# Patient Record
Sex: MALE | Race: WHITE | NOT HISPANIC OR LATINO | Employment: OTHER | URBAN - METROPOLITAN AREA
[De-identification: names, ages, dates, MRNs, and addresses within clinical notes are randomized per-mention and may not be internally consistent; named-entity substitution may affect disease eponyms.]

---

## 2019-02-12 PROBLEM — I10 ESSENTIAL HYPERTENSION: Status: ACTIVE | Noted: 2019-02-12

## 2020-10-16 PROBLEM — M72.0 DUPUYTREN CONTRACTURE: Status: ACTIVE | Noted: 2020-10-16

## 2020-10-16 PROBLEM — F17.200 SMOKER: Status: ACTIVE | Noted: 2020-10-16

## 2021-03-25 PROBLEM — J44.9 CHRONIC OBSTRUCTIVE PULMONARY DISEASE (HCC): Status: ACTIVE | Noted: 2021-03-25

## 2021-03-25 PROBLEM — I47.19 NARROW COMPLEX TACHYCARDIA (HCC): Status: ACTIVE | Noted: 2021-03-25

## 2021-10-15 PROBLEM — C44.311 BASAL CELL CARCINOMA (BCC) OF SKIN OF NOSE: Status: ACTIVE | Noted: 2021-10-15

## 2022-07-02 ENCOUNTER — HOSPITAL ENCOUNTER (OUTPATIENT)
Dept: RADIOLOGY | Facility: MEDICAL CENTER | Age: 67
End: 2022-07-02

## 2022-07-02 PROBLEM — I65.23 BILATERAL CAROTID ARTERY STENOSIS: Status: ACTIVE | Noted: 2022-07-02

## 2022-07-02 PROBLEM — I63.9 LEFT-SIDED CEREBROVASCULAR ACCIDENT (CVA) (HCC): Status: ACTIVE | Noted: 2022-07-02

## 2022-07-02 PROBLEM — R73.03 PREDIABETES: Status: ACTIVE | Noted: 2022-07-02

## 2022-07-03 PROBLEM — Z86.79 HISTORY OF SUPRAVENTRICULAR TACHYCARDIA: Status: ACTIVE | Noted: 2021-03-25

## 2022-08-29 ENCOUNTER — OFFICE VISIT (OUTPATIENT)
Dept: NEUROLOGY | Facility: MEDICAL CENTER | Age: 67
End: 2022-08-29
Attending: NURSE PRACTITIONER
Payer: MEDICARE

## 2022-08-29 VITALS
HEART RATE: 90 BPM | WEIGHT: 188.49 LBS | BODY MASS INDEX: 22.26 KG/M2 | HEIGHT: 77 IN | SYSTOLIC BLOOD PRESSURE: 120 MMHG | OXYGEN SATURATION: 96 % | RESPIRATION RATE: 12 BRPM | TEMPERATURE: 98 F | DIASTOLIC BLOOD PRESSURE: 70 MMHG

## 2022-08-29 DIAGNOSIS — I63.40 CEREBROVASCULAR ACCIDENT (CVA) DUE TO EMBOLISM OF CEREBRAL ARTERY (HCC): ICD-10-CM

## 2022-08-29 DIAGNOSIS — I69.30 LATE EFFECT OF STROKE: ICD-10-CM

## 2022-08-29 DIAGNOSIS — I10 ESSENTIAL HYPERTENSION: ICD-10-CM

## 2022-08-29 DIAGNOSIS — E78.2 MIXED HYPERLIPIDEMIA: ICD-10-CM

## 2022-08-29 DIAGNOSIS — R73.03 PREDIABETES: ICD-10-CM

## 2022-08-29 DIAGNOSIS — F17.200 TOBACCO USE DISORDER: ICD-10-CM

## 2022-08-29 PROCEDURE — 99212 OFFICE O/P EST SF 10 MIN: CPT | Performed by: NURSE PRACTITIONER

## 2022-08-29 PROCEDURE — 99204 OFFICE O/P NEW MOD 45 MIN: CPT | Performed by: NURSE PRACTITIONER

## 2022-08-29 RX ORDER — ATORVASTATIN CALCIUM 40 MG/1
40 TABLET, FILM COATED ORAL NIGHTLY
Qty: 90 TABLET | Refills: 1 | Status: SHIPPED | OUTPATIENT
Start: 2022-08-29 | End: 2022-10-31 | Stop reason: DRUGHIGH

## 2022-08-29 ASSESSMENT — ENCOUNTER SYMPTOMS
DEPRESSION: 0
BRUISES/BLEEDS EASILY: 1
NERVOUS/ANXIOUS: 0
COUGH: 0
BLOOD IN STOOL: 0
FOCAL WEAKNESS: 0
HEADACHES: 0
DOUBLE VISION: 0
PALPITATIONS: 0
BLURRED VISION: 0
SPEECH CHANGE: 1
FALLS: 0
DIZZINESS: 0
TINGLING: 0
WEAKNESS: 0
SENSORY CHANGE: 0

## 2022-08-29 ASSESSMENT — FIBROSIS 4 INDEX: FIB4 SCORE: 1.01

## 2022-08-29 NOTE — PROGRESS NOTES
Subjective     HPI    Arnulfo Crocker is a 66 y.o.  right handed male who presents to [facility] for evaluation of L frontal lobe/insular infarct      He presented to [facility] on 7/2/2022 with complaints of slurred speech and RLE weakness. He had noticed weakness of RUE/RLE in the middle of the night when he got up to use the restroom. MRI demonstrated hyperacute L frontal lobe/insular infarct. NIHSS on admission unknown.    Discharged on DAPT x 10 days and atorvastatin 80mg    PMH :  HTN, SVT  Social History: Smoker-started in 1968, now down to 6 cigarettes daily, 2-3 beers per week. Denies drug use.  Former heavy alcohol 12-18 beers per day quit drinking a while ago.     He is here alone tonight.He feels that the weakness has resolved.  He has been doing a lot of work around his house.He does not check blood pressure at home.  He is down to 6 cigarettes daily. He feels like his speech is close to being normal.  He just finished speech therapy.   He is taking aspirin daily, no problems.       Review of Systems   HENT:  Negative for nosebleeds.    Eyes:  Negative for blurred vision and double vision.   Respiratory:  Negative for cough.    Cardiovascular:  Negative for chest pain and palpitations.   Gastrointestinal:  Negative for blood in stool.   Genitourinary:  Negative for hematuria.   Musculoskeletal:  Negative for falls.   Neurological:  Positive for speech change. Negative for dizziness, tingling, sensory change, focal weakness, weakness and headaches.   Endo/Heme/Allergies:  Bruises/bleeds easily.   Psychiatric/Behavioral:  Negative for depression. The patient is not nervous/anxious.         Current Outpatient Medications on File Prior to Visit   Medication Sig Dispense Refill    aspirin EC 81 MG EC tablet Take 1 Tablet by mouth every day. 90 Tablet 3    metoprolol tartrate (LOPRESSOR) 25 MG Tab TAKE 1/2 TABLET BY MOUTH TWICE DAILY FOR HIGH BLOOD PRESSURE 90 Tablet 2     "lisinopril (PRINIVIL) 20 MG Tab TAKE 1 TABLET BY MOUTH EVERY DAY 90 Tablet 3     No current facility-administered medications on file prior to visit.       Objective         I personally reviewed imaging below and agree with the findings  CT head 7/2/2022  Unremarkable CT scan of the brain.  CTA head 7/2/2022  At the foramen magnum, there is a calcified plaque resulting in 70% stenosis of the left vertebral artery.  Vertebral basilar confluence appears unremarkable.     Left posterior cerebral artery appears patent. Right posterior cerebral artery demonstrates fetal origin, and otherwise appears normal.     Remaining there is prominent calcified plaque involving the left posterior cerebral internal carotid artery at the clinoids, resulting in 60% stenosis.     Left middle cerebral artery is patent.     Right middle cerebral artery is patent  CTA neck 7/2/2022  Submandibular lymphadenopathy is present. Etiology indeterminate.     20% stenosis of the right internal carotid artery at the bifurcation.     20-30% stenosis of the right internal carotid artery to skull base.     30-40% stenosis the left internal carotid artery at the origin. 50-60% stenosis of the left internal carotid artery at the clinoids.     Diminutive atretic appearance of the right vertebral artery. 60-70% stenosis of the right vertebral artery at T1.     70-80% stenosis of the left vertebral artery at T2.     60-70% stenosis of the left vertebral artery at the foramen magnum.    TTE 7/3/2022:  Mild concentric LVH, no evidence of shunt, LA size WNL, EDWARDO 19, no ASD noted.    Stroke Labs:   , A1C 5.9, Creat 0.9    /70 (BP Location: Left arm, Patient Position: Sitting, BP Cuff Size: Adult)   Pulse 90   Temp 36.7 °C (98 °F) (Temporal)   Resp 12   Ht 1.956 m (6' 5\")   Wt 85.5 kg (188 lb 7.9 oz)   SpO2 96%   BMI 22.35 kg/m²          PHYSICAL ASSESSMENT  Constitutional:  Alert, no apparent distress,  Psych:   mood and affect " WNL  Muskuloskeletal:  Moves all extremities equally, strength 5/5  BUE/BLE flexors/extensors, no drift  NEUROLOGICAL ASSESSMENT  Oriented X 4, speech fluent, naming and memory intact  CN II: Visual fields are full to confrontation. Fundoscopic exam is normal with sharp discs and no vascular changes. Pupils are 3 mm and briskly reactive to light.   CN III: IV, VI  EOMs intact, no ptosis  CN V: Facial sensation is intact to pinprick in all 3 divisions bilaterally. Corneal responses are intact.  CN VII: Face is symmetric with normal eye closure and smile.  CN VIII Hearing is normal to rubbing fingers  CN IX, X: Palate elevates symmetrically. Phonation is normal.  CN XI: Head turning and shoulder shrug are intact  CN XII: Tongue is midline with normal movements and no atrophy.                           Sensation to PP equal bilaterally                 No limb ataxia with finger to nose and heel to shin                 Ambulates with steady gait.                 Rhomberg negative                Biceps,brachioradialis, tricep, and patellar reflexes all 2+     Cardiovascular:    S1S2, no abnormal rhythm auscultated, no peripheral edema  Neck:                     No carotid bruits noted   Pulmonary:            Respirations easy, lungs clear to auscultation all fields.     Skin:                     No obvious rashes.    Iniital NIHSS unknown       Current NIHSS    1a. LOC: 0  1b. LOC Questions: 0  1c. LOC Commands: 0  2. Best Gaze:0  3. Visual Fields: 0  4. Facial Paresis: 0  5a. Motor arm left: 0  5b. Motor arm right: 0  6a. Motor leg left: 0  6b. Motor leg right: 0  7. Sensory: 0  8. Best Language: 0  9. Limb Ataxia: 0  10. Dysarthria: 0  11. Extinction/Inattention: 0    Total Score Current 0        Current mRS 0    Assessment & Plan     Late effect of stroke     Presumed Mechanism by TOAST.  Left frontal Insular infarct, although there is calcified block both intra and extra cranial, there is no significant stenosis,  will need embolic work up.  He has recovered well.  __  Large Artery Atherosclerosis  __  Small Vessel (lacunar)  __   Cardioembolic  __   Other (Sickle cell, vasculitis, hypercoagulable)  _X_   Unknown      Stroke risk factors:  HLD, HTN, smoking.     Continue ASA 81mg PO daily for stroke prevention, discussed signs of bleeding.    Refer to cardiology, will need long term heart monitoring, can start with 30 day BioTel.    30 day Biotel    Cardiology referral         Risk factors for Afib:  History of heavy alcohol use, smoking, HTN.       2. Essential Hypertension    Blood pressure goal less than 130/80, currently at goal.    3. Tobacco use disorder       Complete smoking cessation discussed, medications deferred.       4. Prediabetes        A1a 5.9, discussed dietary and exercise modifications.     5. Mixed hyperlidemia.    LDL goal < 70, current 128 (pre-statin), Reduce Atorvastatin to 40mg, discussed medication side effects, will need follow up with primary care evaluate liver function at intervals and refill  Exercise at least 30 minutes daily, avoid/minimize red meat, fried foods, butter, cheese.   Eat 5-6 servings of vegetables and fruits daily, choose lean white meat without skin (chicken, turkey, white fish)--baked, broiled or grilled.      If any new signs of stroke:  sudden weakness, numbness, speech difficulty (slurring or difficulty finding words), imbalance, incoordination, worse headache of life or vision loss occur, call 911.      Take all medications as prescribed unless instructed by your provider.        I I have counseled patient on stroke prevention strategies, stroke symptoms and mimics.  Diet and exercise modifications.  We discussed medication side effects and instructions.       I have provided patient a written personalized stroke prevention plan,  it is filed under the media tab under '[facility]”.      Follow up in 3 months.

## 2022-08-29 NOTE — PATIENT INSTRUCTIONS
If any new signs of stroke:  sudden weakness, numbness, speech difficulty (slurring or difficulty finding words), imbalance, incoordination, worse headache of life or vision loss occur, call 911.      Take all medications as prescribed unless instructed by your provider.    On the atorvastatin, you can start taking 40mg instead of 80mg.   Break the tablet in half for now and I will send a new prescription to [facility].       Cardiology will reach out to you to schedule the heart monitor (I asked if they could send it to you), you will also need a cardiology appointment, I asked if they could do it in [place].

## 2022-09-06 ENCOUNTER — NON-PROVIDER VISIT (OUTPATIENT)
Dept: CARDIOLOGY | Facility: MEDICAL CENTER | Age: 67
End: 2022-09-06
Attending: NURSE PRACTITIONER
Payer: MEDICARE

## 2022-09-06 DIAGNOSIS — I63.40 CEREBROVASCULAR ACCIDENT (CVA) DUE TO EMBOLISM OF CEREBRAL ARTERY (HCC): ICD-10-CM

## 2022-09-06 DIAGNOSIS — I49.1 APC (ATRIAL PREMATURE CONTRACTIONS): ICD-10-CM

## 2022-09-06 DIAGNOSIS — I49.3 PVC'S (PREMATURE VENTRICULAR CONTRACTIONS): ICD-10-CM

## 2022-09-06 NOTE — PROGRESS NOTES
Monitor still in progress on 10/15/2022      Home enrollment completed in the 30 day [employer] heart monitoring program, per SANDI Ashley.  Monitor will be shipped to patient via [employer].  >Pending Baseline Transmission.  >Pending EOS.

## 2022-10-04 ENCOUNTER — TELEPHONE (OUTPATIENT)
Dept: CARDIOLOGY | Facility: MEDICAL CENTER | Age: 67
End: 2022-10-04
Payer: MEDICARE

## 2022-10-04 NOTE — TELEPHONE ENCOUNTER
José Miguel Ohara Pt was returning your call and stated that no prior labs, testing or anything else outside of [facility] except maybe [place] [facility] Prior cardio provider through [facility], but doesn't remember their name.      Thank you,     EDE

## 2022-10-04 NOTE — TELEPHONE ENCOUNTER
Chart Prep    Called patient in regards to records for NP appointment with Dr. Dominguez. To review most recent lab results, ekg, any cardiac testing or ,if he has been treated by a cardiologist. No answer, left voicemail to call back

## 2022-10-12 ENCOUNTER — OFFICE VISIT (OUTPATIENT)
Dept: CARDIOLOGY | Facility: MEDICAL CENTER | Age: 67
End: 2022-10-12
Attending: NURSE PRACTITIONER
Payer: MEDICARE

## 2022-10-12 VITALS
BODY MASS INDEX: 22.19 KG/M2 | HEIGHT: 78 IN | RESPIRATION RATE: 14 BRPM | WEIGHT: 191.8 LBS | HEART RATE: 64 BPM | OXYGEN SATURATION: 98 % | DIASTOLIC BLOOD PRESSURE: 64 MMHG | SYSTOLIC BLOOD PRESSURE: 112 MMHG

## 2022-10-12 DIAGNOSIS — F17.200 TOBACCO USE DISORDER: ICD-10-CM

## 2022-10-12 DIAGNOSIS — Z91.89 OTHER SPECIFIED PERSONAL RISK FACTORS, NOT ELSEWHERE CLASSIFIED: ICD-10-CM

## 2022-10-12 DIAGNOSIS — E78.2 MIXED HYPERLIPIDEMIA: ICD-10-CM

## 2022-10-12 DIAGNOSIS — I10 ESSENTIAL HYPERTENSION: ICD-10-CM

## 2022-10-12 DIAGNOSIS — I63.40 CEREBROVASCULAR ACCIDENT (CVA) DUE TO EMBOLISM OF CEREBRAL ARTERY (HCC): ICD-10-CM

## 2022-10-12 LAB — EKG IMPRESSION: NORMAL

## 2022-10-12 PROCEDURE — 99406 BEHAV CHNG SMOKING 3-10 MIN: CPT | Performed by: INTERNAL MEDICINE

## 2022-10-12 PROCEDURE — 99204 OFFICE O/P NEW MOD 45 MIN: CPT | Mod: 25 | Performed by: INTERNAL MEDICINE

## 2022-10-12 PROCEDURE — 93000 ELECTROCARDIOGRAM COMPLETE: CPT | Performed by: INTERNAL MEDICINE

## 2022-10-12 ASSESSMENT — ENCOUNTER SYMPTOMS
GASTROINTESTINAL NEGATIVE: 1
CONSTITUTIONAL NEGATIVE: 1
FEVER: 0
PALPITATIONS: 0
NAUSEA: 0
MUSCULOSKELETAL NEGATIVE: 1
COUGH: 0
ABDOMINAL PAIN: 0
CARDIOVASCULAR NEGATIVE: 1
BRUISES/BLEEDS EASILY: 0
NERVOUS/ANXIOUS: 0
HEADACHES: 0
MYALGIAS: 0
WEAKNESS: 0
DEPRESSION: 0
WEIGHT LOSS: 0
DOUBLE VISION: 0
DIZZINESS: 0
FOCAL WEAKNESS: 0
CLAUDICATION: 0
NEUROLOGICAL NEGATIVE: 1
RESPIRATORY NEGATIVE: 1
EYES NEGATIVE: 1
PSYCHIATRIC NEGATIVE: 1
SHORTNESS OF BREATH: 0
CHILLS: 0
VOMITING: 0
BLURRED VISION: 0

## 2022-10-12 ASSESSMENT — FIBROSIS 4 INDEX: FIB4 SCORE: 1.01

## 2022-10-12 NOTE — PROGRESS NOTES
Chief Complaint   Patient presents with    Other     NP Dx: Cerebrovascular accident (CVA) due to embolism of cerebral artery (HCC)    Hyperlipidemia     NP Dx: Mixed hyperlipidemia      Hypertension       Subjective     Arnulfo Crocker is a 66 y.o. male who presents today as a consult from Lilia Carcamo, neurology for cerebrovascular accident.    Thank you for allowing me to evaluate Mr. Crocker, who as you know is a 66 year old male with hypertension and hyperlipidemia, chronic tobacco abuse. He was recently admitted to [facility] between 07/02/22 to 07/04/22 for cerebrovascular accident. Since the discharge he has been doing well. He denies chest pain, shortness of breath, palpitations, nausea/vomiting or diaphoresis. He just returned his BioTel monitor. He lives by [place] and keeps active working on his yard.    Past Medical History:   Diagnosis Date    CVA (cerebral vascular accident) (HCC)     H/O mumps     History of chickenpox     Hyperlipidemia     Hypertension      Past Surgical History:   Procedure Laterality Date    ORIF, HAND  2013    Right hand      Family History   Problem Relation Age of Onset    Diabetes Mother     Breast Cancer Mother     Heart Disease Father     Cancer Father     Adjustment disorder with mixed anxiety and depressed mood Father     Hypertension Sister     Stroke Other      Social History     Socioeconomic History    Marital status: Single     Spouse name: Not on file    Number of children: Not on file    Years of education: Not on file    Highest education level: Not on file   Occupational History    Not on file   Tobacco Use    Smoking status: Every Day     Packs/day: 0.25     Types: Cigarettes     Start date: 1968    Smokeless tobacco: Never   Vaping Use    Vaping Use: Never used   Substance and Sexual Activity    Alcohol use: Yes     Alcohol/week: 12.6 oz     Types: 21 Cans of beer per week     Comment: 3 beers per day    Drug use: No    Sexual activity:  Never   Other Topics Concern    Not on file   Social History Narrative    Not on file     Social Determinants of Health     Financial Resource Strain: Not on file   Food Insecurity: Not on file   Transportation Needs: Not on file   Physical Activity: Not on file   Stress: Not on file   Social Connections: Not on file   Intimate Partner Violence: Not on file   Housing Stability: Not on file     No Known Allergies    (Medications reviewed.)  Outpatient Encounter Medications as of 10/12/2022   Medication Sig Dispense Refill    atorvastatin (LIPITOR) 40 MG Tab Take 1 Tablet by mouth every evening. 90 Tablet 1    aspirin EC 81 MG EC tablet Take 1 Tablet by mouth every day. 90 Tablet 3    metoprolol tartrate (LOPRESSOR) 25 MG Tab TAKE 1/2 TABLET BY MOUTH TWICE DAILY FOR HIGH BLOOD PRESSURE 90 Tablet 2    lisinopril (PRINIVIL) 20 MG Tab TAKE 1 TABLET BY MOUTH EVERY DAY (Patient taking differently: Take 10 mg by mouth every day.) 90 Tablet 3     No facility-administered encounter medications on file as of 10/12/2022.     Review of Systems   Constitutional: Negative.  Negative for chills, fever, malaise/fatigue and weight loss.   HENT: Negative.  Negative for hearing loss.    Eyes: Negative.  Negative for blurred vision and double vision.   Respiratory: Negative.  Negative for cough and shortness of breath.    Cardiovascular: Negative.  Negative for chest pain, palpitations, claudication and leg swelling.   Gastrointestinal: Negative.  Negative for abdominal pain, nausea and vomiting.   Genitourinary: Negative.  Negative for dysuria and urgency.   Musculoskeletal: Negative.  Negative for joint pain and myalgias.   Skin: Negative.  Negative for itching and rash.   Neurological: Negative.  Negative for dizziness, focal weakness, weakness and headaches.   Endo/Heme/Allergies: Negative.  Does not bruise/bleed easily.   Psychiatric/Behavioral: Negative.  Negative for depression. The patient is not nervous/anxious.        "      Objective     /64 (BP Location: Left arm, Patient Position: Sitting, BP Cuff Size: Adult)   Pulse 64   Resp 14   Ht 1.981 m (6' 6\")   Wt 87 kg (191 lb 12.8 oz)   SpO2 98%   BMI 22.16 kg/m²     Physical Exam  Constitutional:       Appearance: Normal appearance. He is underweight.   HENT:      Head: Normocephalic and atraumatic.   Neck:      Vascular: No JVD.   Cardiovascular:      Rate and Rhythm: Normal rate and regular rhythm.      Heart sounds: Normal heart sounds.   Pulmonary:      Effort: Pulmonary effort is normal.      Breath sounds: Normal breath sounds.   Abdominal:      General: Bowel sounds are normal.      Palpations: Abdomen is soft.      Comments: No hepatosplenomegaly.   Musculoskeletal:         General: Normal range of motion.   Lymphadenopathy:      Cervical: No cervical adenopathy.   Skin:     General: Skin is warm and dry.   Neurological:      Mental Status: He is alert and oriented to person, place, and time.          CARDIAC STUDIES/PROCEDURES:    CTA OF HEAD AND NECK (07/02/22)  Submandibular lymphadenopathy is present. Etiology indeterminate.  20% stenosis of the right internal carotid artery at the bifurcation.  20-30% stenosis of the right internal carotid artery to skull base.  30-40% stenosis the left internal carotid artery at the origin. 50-60% stenosis of the left internal carotid artery at the clinoids.  Diminutive atretic appearance of the right vertebral artery. 60-70% stenosis of the right vertebral artery at T1.  70-80% stenosis of the left vertebral artery at T2.  60-70% stenosis of the left vertebral artery at the foramen magnum.  (study result reviewed)     ECHOCARDIOGRAM CONCLUSIONS (07/03/22)  Mild concentric left ventricular hypertrophy. Normal left ventricular   size and systolic function.  No evidence of shunt by agitated saline study.  Normal pericardium without effusion.  No prior images available for comparison.  (study result reviewed)    Laboratory " results of (07/03/22) were reviewed. Cholesterol profile of 190/154/31/128 mg/dL noted.    MRA OF BRAIN (07/02/22)  Findings are compatible with hyperacute infarction involving deep white matter of left insula, and posterior left frontal lobe, measuring 19 x 14 x 14 mm.  Degenerative changes are noted.  Remote lacunar infarction, left insula.  (study result reviewed)    Assessment & Plan     1. Cerebrovascular accident (CVA) due to embolism of cerebral artery (HCC)  EKG      2. Essential hypertension        3. Mixed hyperlipidemia        4. Tobacco use disorder            Medical Decision Making: Today's Assessment/Status/Plan:        Status post stroke: He is clinically improving. He had multiple infarctions noted on her MRI study of the brain. He underwent BioTel monitor evaluation. We will obtain the results and schedule for Implantable Loop Recorder placement if atrial fibrillation is not revealed. He understands the risks and benefits and agrees with plan.   Hypertension: Blood pressure is well controlled. We will continue with metoprolol, lisinopril.  Hyperlipidemia: He is doing well on statin therapy without myalgia symptoms. (Managed by primary care physician) We will perform CT coronary calcium study.  Chronic tobacco use: Smoking cessation recommended with discussions of health effects and plans for over 3 minutes.    We will follow up in 3 months.    Thank you for this consult.    CC Leon Zamora

## 2022-10-17 ENCOUNTER — TELEPHONE (OUTPATIENT)
Dept: CARDIOLOGY | Facility: MEDICAL CENTER | Age: 67
End: 2022-10-17
Payer: MEDICARE

## 2022-10-17 DIAGNOSIS — I63.40 CEREBROVASCULAR ACCIDENT (CVA) DUE TO EMBOLISM OF CEREBRAL ARTERY (HCC): ICD-10-CM

## 2022-10-18 PROCEDURE — 93228 REMOTE 30 DAY ECG REV/REPORT: CPT | Performed by: INTERNAL MEDICINE

## 2022-10-18 NOTE — TELEPHONE ENCOUNTER
Jose Dominguez M.D.  You 2 minutes ago (2:36 PM)     Please call with unremarkable BioTel showing no evidence of atrial fibrillation. Please schedule for Implantable Loop Recorder placement as we discussed on our last appointment for stroke evaluation or make a follow up. Marie.  NEDA

## 2022-10-19 NOTE — TELEPHONE ENCOUNTER
Phone Number Called: [phone]    Call outcome: Spoke to patient regarding message below.    Message: Called to inform patient of JI recommendations. Patient verbalizes understanding and would like to proceed with ILR.     TO Grace/Marli: Please reach out to patient to schedule ILR.

## 2022-10-19 NOTE — TELEPHONE ENCOUNTER
Patient scheduled for loop insert on 10-27-22 with Dr. Aaron. Patient has been instructed to check in at 12:00 for 1:00 case time. Message sent to authorizations. ROBERTO CARLOS Sands with [employer].

## 2022-10-27 ENCOUNTER — HOSPITAL ENCOUNTER (OUTPATIENT)
Dept: RADIOLOGY | Facility: MEDICAL CENTER | Age: 67
End: 2022-10-27
Attending: INTERNAL MEDICINE | Admitting: INTERNAL MEDICINE

## 2022-10-27 ENCOUNTER — HOSPITAL ENCOUNTER (OUTPATIENT)
Facility: MEDICAL CENTER | Age: 67
End: 2022-10-27
Attending: INTERNAL MEDICINE | Admitting: INTERNAL MEDICINE
Payer: MEDICARE

## 2022-10-27 ENCOUNTER — APPOINTMENT (OUTPATIENT)
Dept: CARDIOLOGY | Facility: MEDICAL CENTER | Age: 67
End: 2022-10-27
Attending: INTERNAL MEDICINE
Payer: MEDICARE

## 2022-10-27 VITALS
TEMPERATURE: 96.6 F | HEART RATE: 63 BPM | DIASTOLIC BLOOD PRESSURE: 62 MMHG | WEIGHT: 190.7 LBS | RESPIRATION RATE: 16 BRPM | OXYGEN SATURATION: 98 % | BODY MASS INDEX: 22.06 KG/M2 | HEIGHT: 78 IN | SYSTOLIC BLOOD PRESSURE: 168 MMHG

## 2022-10-27 DIAGNOSIS — I63.40 CEREBROVASCULAR ACCIDENT (CVA) DUE TO EMBOLISM OF CEREBRAL ARTERY (HCC): ICD-10-CM

## 2022-10-27 DIAGNOSIS — Z91.89 OTHER SPECIFIED PERSONAL RISK FACTORS, NOT ELSEWHERE CLASSIFIED: ICD-10-CM

## 2022-10-27 PROCEDURE — 160002 HCHG RECOVERY MINUTES (STAT)

## 2022-10-27 PROCEDURE — 33285 INSJ SUBQ CAR RHYTHM MNTR: CPT | Performed by: INTERNAL MEDICINE

## 2022-10-27 PROCEDURE — 4410556 CT-CARDIAC SCORING (SELF PAY ONLY)

## 2022-10-27 PROCEDURE — 33285 INSJ SUBQ CAR RHYTHM MNTR: CPT

## 2022-10-27 PROCEDURE — 160046 HCHG PACU - 1ST 60 MINS PHASE II

## 2022-10-27 RX ORDER — LIDOCAINE HYDROCHLORIDE AND EPINEPHRINE 10; 10 MG/ML; UG/ML
INJECTION, SOLUTION INFILTRATION; PERINEURAL
Status: DISCONTINUED
Start: 2022-10-27 | End: 2022-10-27 | Stop reason: HOSPADM

## 2022-10-27 ASSESSMENT — PAIN DESCRIPTION - PAIN TYPE: TYPE: SURGICAL PAIN

## 2022-10-27 ASSESSMENT — FIBROSIS 4 INDEX: FIB4 SCORE: 1.01

## 2022-10-27 NOTE — OR NURSING
Patient from cath lab, left chest dressing is clean dry and intact. Aaox4, vss. Instructions to patient, ambulated to car.

## 2022-10-27 NOTE — DISCHARGE INSTRUCTIONS
Implantable Loop Recorder Placement, Care After  This sheet gives you information about how to care for yourself after your procedure. Your health care provider may also give you more specific instructions. If you have problems or questions, contact your health care provider.  What can I expect after the procedure?  After the procedure, it is common to have:  Soreness or discomfort near the incision.  Some swelling or bruising near the incision.  Follow these instructions at home:  Incision care    Follow instructions from your health care provider about how to take care of your incision. Make sure you: REMOVE DRESSING IN 1 WEEK. NO SHOWERS UNTIL DRESSING IS REMOVED, YOU MAY SPONGE BATH.  Wash your hands with soap and water before you change your bandage (dressing). If soap and water are not available, use hand .  Change your dressing as told by your health care provider.  Keep your dressing dry.  Leave stitches (sutures), skin glue, or adhesive strips in place. These skin closures may need to stay in place for 2 weeks or longer. If adhesive strip edges start to loosen and curl up, you may trim the loose edges. Do not remove adhesive strips completely unless your health care provider tells you to do that.  Check your incision area every day for signs of infection. Check for:  Redness, swelling, or pain.  Fluid or blood.  Warmth.  Pus or a bad smell.  Do not take baths, swim, or use a hot tub until your health care provider approves. Ask your health care provider if you can take showers.  Activity    Return to your normal activities as told by your health care provider. Ask your health care provider what activities are safe for you.  Do not drive for 24 hours if you were given a sedative during your procedure.  General instructions  Follow instructions from your health care provider about how to manage your implantable loop recorder and transmit the information. Learn how to activate a recording if this is  necessary for your type of device.  Do not go through a metal detection gate, and do not let someone hold a metal detector over your chest. Show your ID card.  Do not have an MRI unless you check with your health care provider first.  Take over-the-counter and prescription medicines only as told by your health care provider.  Keep all follow-up visits as told by your health care provider. This is important.  Contact a health care provider if:  You have redness, swelling, or pain around your incision.  You have a fever.  You have pain that is not relieved by your pain medicine.  You have triggered your device because of fainting (syncope) or because of a heartbeat that feels like it is racing, slow, fluttering, or skipping (palpitations).  Get help right away if you have:  Chest pain.  Difficulty breathing.  Summary  After the procedure, it is common to have soreness or discomfort near the incision.  Change your dressing as told by your health care provider.  Follow instructions from your health care provider about how to manage your implantable loop recorder and transmit the information.  Keep all follow-up visits as told by your health care provider. This is important.  This information is not intended to replace advice given to you by your health care provider. Make sure you discuss any questions you have with your health care provider.

## 2022-10-27 NOTE — OP REPORT
Electrophysiology Procedure Note  [facility]      PROCEDURE PERFORMED: Implantable Loop Recorder    : MARISSA Aaron M.D.    ASSISTANT: None    ANESTHESIA: Local    EBL: <5 cc    SPECIMENS: None    INDICATION: Cryptogenic CVA    COMPLICATIONS: None    PRE-PROCEDURE ECG: Sinus rhythm    POST-PROCEDURE ECG: Sinus rhythm    DESCRIPTION OF PROCEDURE:  After informed written consent, the patient was brought to the cath lab pre/post procedure area. The patient was prepped and draped in the usual sterile fashion. The procedure was performed with local anesthetic. Using the supplied incision tool, a <1 cm incision was made in the skin about 2 cm leftward and lateral to the sternal border at 4th intercostal space. Using the supplied insertion tool, a tunnel was made in the subcutaneous tissue at a 45 degree angle to the sternum and the device was inserted with the supplied plunger. Manual compression was used until hemostasis achieved. Dermabond used for closure. Sterile dressing was applied. Sensing checked and adequate.    IMPLANTED DEVICE INFORMATION:  Model: [employer] LINQ 22  Serial number: [id number]    IMPRESSIONS:  1. Successful implantable loop recorder implantation    RECOMMENDATIONS:  1. Routine follow-up and device interrogation

## 2022-10-31 ENCOUNTER — TELEPHONE (OUTPATIENT)
Dept: CARDIOLOGY | Facility: MEDICAL CENTER | Age: 67
End: 2022-10-31
Payer: MEDICARE

## 2022-10-31 DIAGNOSIS — E78.2 MIXED HYPERLIPIDEMIA: ICD-10-CM

## 2022-10-31 DIAGNOSIS — I10 ESSENTIAL HYPERTENSION: ICD-10-CM

## 2022-10-31 DIAGNOSIS — I71.9 AORTIC ANEURYSM WITHOUT RUPTURE, UNSPECIFIED PORTION OF AORTA (HCC): ICD-10-CM

## 2022-10-31 DIAGNOSIS — R93.1 ABNORMAL FINDINGS ON DIAGNOSTIC IMAGING OF HEART AND CORONARY CIRCULATION: ICD-10-CM

## 2022-10-31 RX ORDER — ATORVASTATIN CALCIUM 80 MG/1
80 TABLET, FILM COATED ORAL NIGHTLY
Qty: 90 TABLET | Refills: 3 | Status: SHIPPED | OUTPATIENT
Start: 2022-10-31 | End: 2023-08-25 | Stop reason: SDUPTHER

## 2022-10-31 NOTE — TELEPHONE ENCOUNTER
Phone Number Called: [phone]    Call outcome: Left detailed message for patient. Informed to call back with any additional questions.    Message: Called to inform patient that it is recommended that patient come in for his wound site check.

## 2022-10-31 NOTE — TELEPHONE ENCOUNTER
AMBER Paniagua: Arnulfo Crocker    Topic/issue: LOOP RECORDER APPOINTMENT    Patient would like to know if he needs to come to his wound check on 11/4. He states that he doesn't have the funds to come 80 miles both ways for this appointment. Please advise.    Thank you,  Blair KRAMER    Callback Number: [phone] (home)

## 2022-10-31 NOTE — TELEPHONE ENCOUNTER
Spoke with patient--advised ok to not be seen.  Reviewed wound care--patient verbalized understanding.  States not redness, bleeding or swelling noted at site. Direct line given should he have any further questions.

## 2022-10-31 NOTE — TELEPHONE ENCOUNTER
Phone Number Called: [phone]    Call outcome: Spoke to patient regarding message below.    Message: Called to inform patient of SC recommendations. Advised patient to increase his Atorvastatin from 40 MG to 80 MG daily. Patient is concerned about medication cost. Will mail patient [employer] coupon. Patient verbalizes understanding.     Patient states that he is feeling good. Denies any symptoms at this time. Patient will complete ordered Imaging.     Lab work orders mailed to patient as well.       ----- Message from MYA Dc sent at 10/31/2022  9:12 AM PDT -----  Abnormal CT showing significant CAD.    Recommend increase atorvastatin to 80 mg QPM. LDL goal <70 now.    Check lipid/cmp in 6 months.    Recommend cardiac PET to review abnormal findings. Check on patient's symptoms. SC

## 2022-11-14 ENCOUNTER — TELEPHONE (OUTPATIENT)
Dept: NEUROLOGY | Facility: MEDICAL CENTER | Age: 67
End: 2022-11-14
Payer: MEDICARE

## 2022-11-14 NOTE — TELEPHONE ENCOUNTER
Established Patient     [employer] Patient is checked in Patient Demographics? Yes    Is visit type and length correct?  Yes    Is referral attached to visit? Yes    Were records received from referring provider? Yes    Patient was not contacted to have someone accompany them to visit?    Is this appointment scheduled as a Hospital Follow-Up?  No    Does the patient require any pre procedure or post procedure follow up? No    If any orders were placed at last visit or intended to be done for this visit do we have Results/Consult Notes? Yes  Labs - Labs were not ordered at last office visit.  Imaging - Imaging was not ordered at last office visit.  Referrals - Referral was placed to Cardiology but it is not known if patient has been seen as of yet.         10.  If patient appointment is for Botox - is order pended for provider? No   
Sepsis /source unclear /DDx includes SBP/cholangitis /right pleural effusion? PNA/? ischemic colitis  Admit to med surg  #IV cefepime,IV vanco  #f/U blood cx ,urine cx  # s/p 2 litres NS IVF- will do slow cautious hydration secondary to portal HTN/ascites to avoid third spacing  # IV albumin infusions   # ID consult  #IR consult to consider paracentesis/right pleural tap?  #Surgery consult (r/o acute cholecystitsi/vs ischemic bowel)-discussed with dr roman -who will see patient this evening  # GI consult

## 2022-11-27 ENCOUNTER — NON-PROVIDER VISIT (OUTPATIENT)
Dept: CARDIOLOGY | Facility: MEDICAL CENTER | Age: 67
End: 2022-11-27
Payer: MEDICARE

## 2022-11-27 PROCEDURE — 93298 REM INTERROG DEV EVAL SCRMS: CPT | Performed by: INTERNAL MEDICINE

## 2022-11-28 ENCOUNTER — APPOINTMENT (OUTPATIENT)
Dept: NEUROLOGY | Facility: MEDICAL CENTER | Age: 67
End: 2022-11-28
Attending: NURSE PRACTITIONER
Payer: MEDICARE

## 2022-11-28 NOTE — CARDIAC REMOTE MONITOR - SCAN
Device transmission reviewed. Device demonstrated appropriate function.       Electronically Signed by: Gayla Shah M.D.    11/30/2022  3:12 PM

## 2022-12-28 ENCOUNTER — NON-PROVIDER VISIT (OUTPATIENT)
Dept: CARDIOLOGY | Facility: MEDICAL CENTER | Age: 67
End: 2022-12-28
Payer: MEDICARE

## 2022-12-28 PROCEDURE — 93298 REM INTERROG DEV EVAL SCRMS: CPT | Performed by: INTERNAL MEDICINE

## 2022-12-29 NOTE — CARDIAC REMOTE MONITOR - SCAN
Device transmission reviewed. Device demonstrated appropriate function.       Electronically Signed by: Gayla Shah M.D.    1/5/2023  10:47 AM

## 2023-01-28 ENCOUNTER — NON-PROVIDER VISIT (OUTPATIENT)
Dept: CARDIOLOGY | Facility: MEDICAL CENTER | Age: 68
End: 2023-01-28
Payer: MEDICARE

## 2023-01-28 PROCEDURE — 93298 REM INTERROG DEV EVAL SCRMS: CPT | Performed by: INTERNAL MEDICINE

## 2023-01-30 NOTE — CARDIAC REMOTE MONITOR - SCAN
Device transmission reviewed. Device demonstrated appropriate function.       Electronically Signed by: Amos Calvert M.D.    1/30/2023  7:51 PM

## 2023-02-28 ENCOUNTER — NON-PROVIDER VISIT (OUTPATIENT)
Dept: CARDIOLOGY | Facility: MEDICAL CENTER | Age: 68
End: 2023-02-28
Payer: MEDICARE

## 2023-02-28 PROCEDURE — 93298 REM INTERROG DEV EVAL SCRMS: CPT | Performed by: INTERNAL MEDICINE

## 2023-03-01 NOTE — CARDIAC REMOTE MONITOR - SCAN
Device transmission reviewed. Device demonstrated appropriate function.       Electronically Signed by: Aba Aaron M.D.    3/22/2023  3:00 PM

## 2023-03-31 ENCOUNTER — NON-PROVIDER VISIT (OUTPATIENT)
Dept: CARDIOLOGY | Facility: MEDICAL CENTER | Age: 68
End: 2023-03-31
Payer: MEDICARE

## 2023-03-31 PROCEDURE — 93298 REM INTERROG DEV EVAL SCRMS: CPT | Performed by: INTERNAL MEDICINE

## 2023-04-01 NOTE — CARDIAC REMOTE MONITOR - SCAN
Device transmission reviewed. Device demonstrated appropriate function.       Electronically Signed by: Aba Aaron M.D.    4/8/2023  10:52 AM

## 2023-05-01 ENCOUNTER — NON-PROVIDER VISIT (OUTPATIENT)
Dept: CARDIOLOGY | Facility: MEDICAL CENTER | Age: 68
End: 2023-05-01
Payer: MEDICARE

## 2023-05-01 PROCEDURE — 93298 REM INTERROG DEV EVAL SCRMS: CPT | Performed by: INTERNAL MEDICINE

## 2023-06-01 ENCOUNTER — NON-PROVIDER VISIT (OUTPATIENT)
Dept: CARDIOLOGY | Facility: MEDICAL CENTER | Age: 68
End: 2023-06-01
Payer: MEDICARE

## 2023-06-01 PROCEDURE — 93298 REM INTERROG DEV EVAL SCRMS: CPT | Performed by: INTERNAL MEDICINE

## 2023-06-02 NOTE — CARDIAC REMOTE MONITOR - SCAN
Device transmission reviewed. Device demonstrated appropriate function.       Electronically Signed by: Aba Aaron M.D.    6/3/2023  9:41 AM

## 2023-07-02 ENCOUNTER — NON-PROVIDER VISIT (OUTPATIENT)
Dept: CARDIOLOGY | Facility: MEDICAL CENTER | Age: 68
End: 2023-07-02
Payer: MEDICARE

## 2023-07-02 PROCEDURE — 93298 REM INTERROG DEV EVAL SCRMS: CPT | Performed by: INTERNAL MEDICINE

## 2023-07-03 NOTE — CARDIAC REMOTE MONITOR - SCAN
Device transmission reviewed. Device demonstrated appropriate function.       Electronically Signed by: Gayla Shah M.D.    7/14/2023  8:34 AM

## 2023-08-02 ENCOUNTER — NON-PROVIDER VISIT (OUTPATIENT)
Dept: CARDIOLOGY | Facility: MEDICAL CENTER | Age: 68
End: 2023-08-02
Payer: MEDICARE

## 2023-08-02 PROCEDURE — 93298 REM INTERROG DEV EVAL SCRMS: CPT | Performed by: INTERNAL MEDICINE

## 2023-08-03 NOTE — CARDIAC REMOTE MONITOR - SCAN
Device transmission reviewed. Device demonstrated appropriate function.       Electronically Signed by: Aba Aaron M.D.    8/4/2023  12:35 PM

## 2023-09-02 ENCOUNTER — NON-PROVIDER VISIT (OUTPATIENT)
Dept: CARDIOLOGY | Facility: MEDICAL CENTER | Age: 68
End: 2023-09-02
Payer: MEDICARE

## 2023-09-02 PROCEDURE — 93298 REM INTERROG DEV EVAL SCRMS: CPT | Performed by: INTERNAL MEDICINE

## 2023-09-05 NOTE — CARDIAC REMOTE MONITOR - SCAN
Device transmission reviewed. Device demonstrated appropriate function.       Electronically Signed by: Gayla Shah M.D.    9/19/2023  10:16 AM

## 2024-10-10 ENCOUNTER — TELEPHONE (OUTPATIENT)
Dept: CARDIOLOGY | Facility: MEDICAL CENTER | Age: 69
End: 2024-10-10
Payer: MEDICARE

## 2024-11-18 ENCOUNTER — HOSPITAL ENCOUNTER (INPATIENT)
Facility: MEDICAL CENTER | Age: 69
LOS: 9 days | End: 2024-11-27
Attending: STUDENT IN AN ORGANIZED HEALTH CARE EDUCATION/TRAINING PROGRAM
Payer: MEDICARE

## 2024-11-18 ENCOUNTER — APPOINTMENT (OUTPATIENT)
Dept: RADIOLOGY | Facility: MEDICAL CENTER | Age: 69
End: 2024-11-18
Attending: STUDENT IN AN ORGANIZED HEALTH CARE EDUCATION/TRAINING PROGRAM
Payer: MEDICARE

## 2024-11-18 DIAGNOSIS — D72.829 LEUKOCYTOSIS, UNSPECIFIED TYPE: ICD-10-CM

## 2024-11-18 DIAGNOSIS — M86.171 OTHER ACUTE OSTEOMYELITIS OF RIGHT FOOT (HCC): ICD-10-CM

## 2024-11-18 DIAGNOSIS — I96 GANGRENE (HCC): ICD-10-CM

## 2024-11-18 PROBLEM — M86.9 OSTEOMYELITIS (HCC): Status: ACTIVE | Noted: 2024-11-18

## 2024-11-18 LAB
ALBUMIN SERPL BCP-MCNC: 3.8 G/DL (ref 3.2–4.9)
ALBUMIN/GLOB SERPL: 1 G/DL
ALP SERPL-CCNC: 102 U/L (ref 30–99)
ALT SERPL-CCNC: 8 U/L (ref 2–50)
ANION GAP SERPL CALC-SCNC: 14 MMOL/L (ref 7–16)
APPEARANCE UR: CLEAR
AST SERPL-CCNC: 15 U/L (ref 12–45)
BACTERIA #/AREA URNS HPF: ABNORMAL /HPF
BASOPHILS # BLD AUTO: 0.5 % (ref 0–1.8)
BASOPHILS # BLD: 0.06 K/UL (ref 0–0.12)
BILIRUB SERPL-MCNC: 0.3 MG/DL (ref 0.1–1.5)
BILIRUB UR QL STRIP.AUTO: NEGATIVE
BUN SERPL-MCNC: 16 MG/DL (ref 8–22)
CALCIUM ALBUM COR SERPL-MCNC: 9.2 MG/DL (ref 8.5–10.5)
CALCIUM SERPL-MCNC: 9 MG/DL (ref 8.5–10.5)
CASTS URNS QL MICRO: ABNORMAL /LPF (ref 0–2)
CHLORIDE SERPL-SCNC: 99 MMOL/L (ref 96–112)
CO2 SERPL-SCNC: 22 MMOL/L (ref 20–33)
COLOR UR: YELLOW
CREAT SERPL-MCNC: 0.86 MG/DL (ref 0.5–1.4)
EOSINOPHIL # BLD AUTO: 0.03 K/UL (ref 0–0.51)
EOSINOPHIL NFR BLD: 0.2 % (ref 0–6.9)
EPITHELIAL CELLS 1715: ABNORMAL /HPF (ref 0–5)
ERYTHROCYTE [DISTWIDTH] IN BLOOD BY AUTOMATED COUNT: 53.1 FL (ref 35.9–50)
GFR SERPLBLD CREATININE-BSD FMLA CKD-EPI: 93 ML/MIN/1.73 M 2
GLOBULIN SER CALC-MCNC: 4 G/DL (ref 1.9–3.5)
GLUCOSE SERPL-MCNC: 140 MG/DL (ref 65–99)
GLUCOSE UR STRIP.AUTO-MCNC: NEGATIVE MG/DL
HCT VFR BLD AUTO: 44.8 % (ref 42–52)
HGB BLD-MCNC: 15.4 G/DL (ref 14–18)
HYALINE CAST   1831: PRESENT /LPF
IMM GRANULOCYTES # BLD AUTO: 0.05 K/UL (ref 0–0.11)
IMM GRANULOCYTES NFR BLD AUTO: 0.4 % (ref 0–0.9)
KETONES UR STRIP.AUTO-MCNC: NEGATIVE MG/DL
LACTATE SERPL-SCNC: 1.6 MMOL/L (ref 0.5–2)
LEUKOCYTE ESTERASE UR QL STRIP.AUTO: NEGATIVE
LYMPHOCYTES # BLD AUTO: 1.47 K/UL (ref 1–4.8)
LYMPHOCYTES NFR BLD: 11.8 % (ref 22–41)
MCH RBC QN AUTO: 32.4 PG (ref 27–33)
MCHC RBC AUTO-ENTMCNC: 34.4 G/DL (ref 32.3–36.5)
MCV RBC AUTO: 94.1 FL (ref 81.4–97.8)
MICRO URNS: ABNORMAL
MONOCYTES # BLD AUTO: 0.91 K/UL (ref 0–0.85)
MONOCYTES NFR BLD AUTO: 7.3 % (ref 0–13.4)
NEUTROPHILS # BLD AUTO: 9.99 K/UL (ref 1.82–7.42)
NEUTROPHILS NFR BLD: 79.8 % (ref 44–72)
NITRITE UR QL STRIP.AUTO: NEGATIVE
NRBC # BLD AUTO: 0 K/UL
NRBC BLD-RTO: 0 /100 WBC (ref 0–0.2)
PH UR STRIP.AUTO: 5 [PH] (ref 5–8)
PLATELET # BLD AUTO: 334 K/UL (ref 164–446)
PMV BLD AUTO: 8.9 FL (ref 9–12.9)
POTASSIUM SERPL-SCNC: 4.1 MMOL/L (ref 3.6–5.5)
PROT SERPL-MCNC: 7.8 G/DL (ref 6–8.2)
PROT UR QL STRIP: NEGATIVE MG/DL
RBC # BLD AUTO: 4.76 M/UL (ref 4.7–6.1)
RBC # URNS HPF: ABNORMAL /HPF (ref 0–2)
RBC UR QL AUTO: ABNORMAL
SODIUM SERPL-SCNC: 135 MMOL/L (ref 135–145)
SP GR UR STRIP.AUTO: 1.02
UROBILINOGEN UR STRIP.AUTO-MCNC: 0.2 EU/DL
WBC # BLD AUTO: 12.5 K/UL (ref 4.8–10.8)
WBC #/AREA URNS HPF: ABNORMAL /HPF

## 2024-11-18 PROCEDURE — 700117 HCHG RX CONTRAST REV CODE 255: Performed by: STUDENT IN AN ORGANIZED HEALTH CARE EDUCATION/TRAINING PROGRAM

## 2024-11-18 PROCEDURE — 73706 CT ANGIO LWR EXTR W/O&W/DYE: CPT | Mod: RT

## 2024-11-18 PROCEDURE — 99406 BEHAV CHNG SMOKING 3-10 MIN: CPT

## 2024-11-18 PROCEDURE — 80053 COMPREHEN METABOLIC PANEL: CPT

## 2024-11-18 PROCEDURE — 83605 ASSAY OF LACTIC ACID: CPT

## 2024-11-18 PROCEDURE — 90715 TDAP VACCINE 7 YRS/> IM: CPT | Performed by: STUDENT IN AN ORGANIZED HEALTH CARE EDUCATION/TRAINING PROGRAM

## 2024-11-18 PROCEDURE — 85025 COMPLETE CBC W/AUTO DIFF WBC: CPT

## 2024-11-18 PROCEDURE — 81001 URINALYSIS AUTO W/SCOPE: CPT

## 2024-11-18 PROCEDURE — 770001 HCHG ROOM/CARE - MED/SURG/GYN PRIV*

## 2024-11-18 PROCEDURE — 99223 1ST HOSP IP/OBS HIGH 75: CPT | Mod: 25

## 2024-11-18 PROCEDURE — 90471 IMMUNIZATION ADMIN: CPT

## 2024-11-18 PROCEDURE — 36415 COLL VENOUS BLD VENIPUNCTURE: CPT

## 2024-11-18 PROCEDURE — 96365 THER/PROPH/DIAG IV INF INIT: CPT

## 2024-11-18 PROCEDURE — 700111 HCHG RX REV CODE 636 W/ 250 OVERRIDE (IP): Performed by: STUDENT IN AN ORGANIZED HEALTH CARE EDUCATION/TRAINING PROGRAM

## 2024-11-18 PROCEDURE — 87040 BLOOD CULTURE FOR BACTERIA: CPT | Mod: 91

## 2024-11-18 PROCEDURE — 87086 URINE CULTURE/COLONY COUNT: CPT

## 2024-11-18 PROCEDURE — 99285 EMERGENCY DEPT VISIT HI MDM: CPT

## 2024-11-18 PROCEDURE — 3E0234Z INTRODUCTION OF SERUM, TOXOID AND VACCINE INTO MUSCLE, PERCUTANEOUS APPROACH: ICD-10-PCS | Performed by: STUDENT IN AN ORGANIZED HEALTH CARE EDUCATION/TRAINING PROGRAM

## 2024-11-18 PROCEDURE — 71045 X-RAY EXAM CHEST 1 VIEW: CPT

## 2024-11-18 RX ORDER — ONDANSETRON 4 MG/1
4 TABLET, ORALLY DISINTEGRATING ORAL EVERY 4 HOURS PRN
Status: DISCONTINUED | OUTPATIENT
Start: 2024-11-18 | End: 2024-11-27 | Stop reason: HOSPADM

## 2024-11-18 RX ORDER — OXYCODONE HYDROCHLORIDE 5 MG/1
2.5 TABLET ORAL EVERY 6 HOURS PRN
Status: DISCONTINUED | OUTPATIENT
Start: 2024-11-18 | End: 2024-11-27 | Stop reason: HOSPADM

## 2024-11-18 RX ORDER — METRONIDAZOLE 500 MG/100ML
500 INJECTION, SOLUTION INTRAVENOUS ONCE
Status: COMPLETED | OUTPATIENT
Start: 2024-11-18 | End: 2024-11-18

## 2024-11-18 RX ORDER — NICOTINE 21 MG/24HR
21 PATCH, TRANSDERMAL 24 HOURS TRANSDERMAL
Status: DISCONTINUED | OUTPATIENT
Start: 2024-11-19 | End: 2024-11-27 | Stop reason: HOSPADM

## 2024-11-18 RX ORDER — ACETAMINOPHEN 325 MG/1
650 TABLET ORAL EVERY 6 HOURS PRN
Status: DISCONTINUED | OUTPATIENT
Start: 2024-11-18 | End: 2024-11-27 | Stop reason: HOSPADM

## 2024-11-18 RX ORDER — LABETALOL HYDROCHLORIDE 5 MG/ML
10 INJECTION, SOLUTION INTRAVENOUS EVERY 4 HOURS PRN
Status: DISCONTINUED | OUTPATIENT
Start: 2024-11-18 | End: 2024-11-21

## 2024-11-18 RX ORDER — OXYCODONE HYDROCHLORIDE 5 MG/1
5 TABLET ORAL EVERY 6 HOURS PRN
Status: DISCONTINUED | OUTPATIENT
Start: 2024-11-18 | End: 2024-11-27 | Stop reason: HOSPADM

## 2024-11-18 RX ORDER — POLYETHYLENE GLYCOL 3350 17 G/17G
1 POWDER, FOR SOLUTION ORAL
Status: DISCONTINUED | OUTPATIENT
Start: 2024-11-18 | End: 2024-11-27 | Stop reason: HOSPADM

## 2024-11-18 RX ORDER — HYDROMORPHONE HYDROCHLORIDE 1 MG/ML
0.25 INJECTION, SOLUTION INTRAMUSCULAR; INTRAVENOUS; SUBCUTANEOUS EVERY 4 HOURS PRN
Status: DISCONTINUED | OUTPATIENT
Start: 2024-11-18 | End: 2024-11-27 | Stop reason: HOSPADM

## 2024-11-18 RX ORDER — AMOXICILLIN 250 MG
2 CAPSULE ORAL EVERY EVENING
Status: DISCONTINUED | OUTPATIENT
Start: 2024-11-19 | End: 2024-11-27 | Stop reason: HOSPADM

## 2024-11-18 RX ADMIN — CLOSTRIDIUM TETANI TOXOID ANTIGEN (FORMALDEHYDE INACTIVATED), CORYNEBACTERIUM DIPHTHERIAE TOXOID ANTIGEN (FORMALDEHYDE INACTIVATED), BORDETELLA PERTUSSIS TOXOID ANTIGEN (GLUTARALDEHYDE INACTIVATED), BORDETELLA PERTUSSIS FILAMENTOUS HEMAGGLUTININ ANTIGEN (FORMALDEHYDE INACTIVATED), BORDETELLA PERTUSSIS PERTACTIN ANTIGEN, AND BORDETELLA PERTUSSIS FIMBRIAE 2/3 ANTIGEN 0.5 ML: 5; 2; 2.5; 5; 3; 5 INJECTION, SUSPENSION INTRAMUSCULAR at 21:58

## 2024-11-18 RX ADMIN — IOHEXOL 100 ML: 350 INJECTION, SOLUTION INTRAVENOUS at 21:30

## 2024-11-18 RX ADMIN — METRONIDAZOLE 500 MG: 500 INJECTION, SOLUTION INTRAVENOUS at 21:34

## 2024-11-18 ASSESSMENT — PATIENT HEALTH QUESTIONNAIRE - PHQ9
2. FEELING DOWN, DEPRESSED, IRRITABLE, OR HOPELESS: NOT AT ALL
1. LITTLE INTEREST OR PLEASURE IN DOING THINGS: NOT AT ALL
SUM OF ALL RESPONSES TO PHQ9 QUESTIONS 1 AND 2: 0

## 2024-11-18 ASSESSMENT — PAIN DESCRIPTION - PAIN TYPE: TYPE: ACUTE PAIN

## 2024-11-18 ASSESSMENT — FIBROSIS 4 INDEX
FIB4 SCORE: 0.36
FIB4 SCORE: 1.1

## 2024-11-19 PROBLEM — E78.5 HYPERLIPIDEMIA: Status: ACTIVE | Noted: 2024-11-19

## 2024-11-19 PROBLEM — R65.10 SIRS (SYSTEMIC INFLAMMATORY RESPONSE SYNDROME) (HCC): Status: ACTIVE | Noted: 2024-11-19

## 2024-11-19 LAB
ALBUMIN SERPL BCP-MCNC: 3.3 G/DL (ref 3.2–4.9)
ALBUMIN/GLOB SERPL: 1 G/DL
ALP SERPL-CCNC: 83 U/L (ref 30–99)
ALT SERPL-CCNC: 5 U/L (ref 2–50)
ANION GAP SERPL CALC-SCNC: 12 MMOL/L (ref 7–16)
AST SERPL-CCNC: 14 U/L (ref 12–45)
BASOPHILS # BLD AUTO: 0.4 % (ref 0–1.8)
BASOPHILS # BLD: 0.04 K/UL (ref 0–0.12)
BILIRUB SERPL-MCNC: 0.5 MG/DL (ref 0.1–1.5)
BUN SERPL-MCNC: 11 MG/DL (ref 8–22)
CALCIUM ALBUM COR SERPL-MCNC: 9.3 MG/DL (ref 8.5–10.5)
CALCIUM SERPL-MCNC: 8.7 MG/DL (ref 8.5–10.5)
CHLORIDE SERPL-SCNC: 101 MMOL/L (ref 96–112)
CO2 SERPL-SCNC: 21 MMOL/L (ref 20–33)
CREAT SERPL-MCNC: 0.72 MG/DL (ref 0.5–1.4)
EOSINOPHIL # BLD AUTO: 0.06 K/UL (ref 0–0.51)
EOSINOPHIL NFR BLD: 0.5 % (ref 0–6.9)
ERYTHROCYTE [DISTWIDTH] IN BLOOD BY AUTOMATED COUNT: 51.5 FL (ref 35.9–50)
GFR SERPLBLD CREATININE-BSD FMLA CKD-EPI: 98 ML/MIN/1.73 M 2
GLOBULIN SER CALC-MCNC: 3.4 G/DL (ref 1.9–3.5)
GLUCOSE SERPL-MCNC: 109 MG/DL (ref 65–99)
GRAM STN SPEC: NORMAL
HCT VFR BLD AUTO: 38.2 % (ref 42–52)
HGB BLD-MCNC: 13.4 G/DL (ref 14–18)
IMM GRANULOCYTES # BLD AUTO: 0.07 K/UL (ref 0–0.11)
IMM GRANULOCYTES NFR BLD AUTO: 0.6 % (ref 0–0.9)
LYMPHOCYTES # BLD AUTO: 1.29 K/UL (ref 1–4.8)
LYMPHOCYTES NFR BLD: 11.8 % (ref 22–41)
MCH RBC QN AUTO: 32.5 PG (ref 27–33)
MCHC RBC AUTO-ENTMCNC: 35.1 G/DL (ref 32.3–36.5)
MCV RBC AUTO: 92.7 FL (ref 81.4–97.8)
MONOCYTES # BLD AUTO: 0.91 K/UL (ref 0–0.85)
MONOCYTES NFR BLD AUTO: 8.3 % (ref 0–13.4)
NEUTROPHILS # BLD AUTO: 8.56 K/UL (ref 1.82–7.42)
NEUTROPHILS NFR BLD: 78.4 % (ref 44–72)
NRBC # BLD AUTO: 0 K/UL
NRBC BLD-RTO: 0 /100 WBC (ref 0–0.2)
PLATELET # BLD AUTO: 274 K/UL (ref 164–446)
PMV BLD AUTO: 8.8 FL (ref 9–12.9)
POTASSIUM SERPL-SCNC: 3.9 MMOL/L (ref 3.6–5.5)
PROT SERPL-MCNC: 6.7 G/DL (ref 6–8.2)
RBC # BLD AUTO: 4.12 M/UL (ref 4.7–6.1)
SIGNIFICANT IND 70042: NORMAL
SITE SITE: NORMAL
SODIUM SERPL-SCNC: 134 MMOL/L (ref 135–145)
SOURCE SOURCE: NORMAL
WBC # BLD AUTO: 10.9 K/UL (ref 4.8–10.8)

## 2024-11-19 PROCEDURE — A9270 NON-COVERED ITEM OR SERVICE: HCPCS

## 2024-11-19 PROCEDURE — 36415 COLL VENOUS BLD VENIPUNCTURE: CPT

## 2024-11-19 PROCEDURE — 700111 HCHG RX REV CODE 636 W/ 250 OVERRIDE (IP): Mod: JZ

## 2024-11-19 PROCEDURE — 700105 HCHG RX REV CODE 258

## 2024-11-19 PROCEDURE — 99233 SBSQ HOSP IP/OBS HIGH 50: CPT | Performed by: INTERNAL MEDICINE

## 2024-11-19 PROCEDURE — 99223 1ST HOSP IP/OBS HIGH 75: CPT | Performed by: SURGERY

## 2024-11-19 PROCEDURE — 700102 HCHG RX REV CODE 250 W/ 637 OVERRIDE(OP)

## 2024-11-19 PROCEDURE — 97602 WOUND(S) CARE NON-SELECTIVE: CPT

## 2024-11-19 PROCEDURE — 87205 SMEAR GRAM STAIN: CPT

## 2024-11-19 PROCEDURE — 770001 HCHG ROOM/CARE - MED/SURG/GYN PRIV*

## 2024-11-19 PROCEDURE — 87070 CULTURE OTHR SPECIMN AEROBIC: CPT

## 2024-11-19 PROCEDURE — 87076 CULTURE ANAEROBE IDENT EACH: CPT | Mod: 91

## 2024-11-19 PROCEDURE — 99406 BEHAV CHNG SMOKING 3-10 MIN: CPT

## 2024-11-19 PROCEDURE — 85025 COMPLETE CBC W/AUTO DIFF WBC: CPT

## 2024-11-19 PROCEDURE — 80053 COMPREHEN METABOLIC PANEL: CPT

## 2024-11-19 RX ORDER — LISINOPRIL 10 MG/1
30 TABLET ORAL DAILY
Status: DISCONTINUED | OUTPATIENT
Start: 2024-11-19 | End: 2024-11-27 | Stop reason: HOSPADM

## 2024-11-19 RX ORDER — METOPROLOL TARTRATE 25 MG/1
12.5 TABLET, FILM COATED ORAL 2 TIMES DAILY
Status: DISCONTINUED | OUTPATIENT
Start: 2024-11-19 | End: 2024-11-27 | Stop reason: HOSPADM

## 2024-11-19 RX ORDER — ATORVASTATIN CALCIUM 40 MG/1
80 TABLET, FILM COATED ORAL EVERY EVENING
Status: DISCONTINUED | OUTPATIENT
Start: 2024-11-19 | End: 2024-11-27 | Stop reason: HOSPADM

## 2024-11-19 RX ORDER — LINEZOLID 600 MG/1
600 TABLET, FILM COATED ORAL EVERY 12 HOURS
Status: COMPLETED | OUTPATIENT
Start: 2024-11-19 | End: 2024-11-25

## 2024-11-19 RX ADMIN — LINEZOLID 600 MG: 600 TABLET, FILM COATED ORAL at 01:10

## 2024-11-19 RX ADMIN — ATORVASTATIN CALCIUM 80 MG: 40 TABLET, FILM COATED ORAL at 17:06

## 2024-11-19 RX ADMIN — METOPROLOL TARTRATE 12.5 MG: 25 TABLET, FILM COATED ORAL at 16:02

## 2024-11-19 RX ADMIN — NICOTINE TRANSDERMAL SYSTEM 21 MG: 21 PATCH, EXTENDED RELEASE TRANSDERMAL at 05:21

## 2024-11-19 RX ADMIN — LISINOPRIL 30 MG: 10 TABLET ORAL at 05:09

## 2024-11-19 RX ADMIN — AMPICILLIN AND SULBACTAM 3 G: 1; 2 INJECTION, POWDER, FOR SOLUTION INTRAMUSCULAR; INTRAVENOUS at 05:17

## 2024-11-19 RX ADMIN — SENNOSIDES AND DOCUSATE SODIUM 2 TABLET: 50; 8.6 TABLET ORAL at 17:07

## 2024-11-19 RX ADMIN — AMPICILLIN AND SULBACTAM 3 G: 1; 2 INJECTION, POWDER, FOR SOLUTION INTRAMUSCULAR; INTRAVENOUS at 01:07

## 2024-11-19 RX ADMIN — AMPICILLIN AND SULBACTAM 3 G: 1; 2 INJECTION, POWDER, FOR SOLUTION INTRAMUSCULAR; INTRAVENOUS at 16:01

## 2024-11-19 RX ADMIN — OXYCODONE 5 MG: 5 TABLET ORAL at 15:55

## 2024-11-19 RX ADMIN — METOPROLOL TARTRATE 12.5 MG: 25 TABLET, FILM COATED ORAL at 05:09

## 2024-11-19 RX ADMIN — LINEZOLID 600 MG: 600 TABLET, FILM COATED ORAL at 16:02

## 2024-11-19 RX ADMIN — AMPICILLIN AND SULBACTAM 3 G: 1; 2 INJECTION, POWDER, FOR SOLUTION INTRAMUSCULAR; INTRAVENOUS at 12:39

## 2024-11-19 SDOH — ECONOMIC STABILITY: TRANSPORTATION INSECURITY
IN THE PAST 12 MONTHS, HAS LACK OF RELIABLE TRANSPORTATION KEPT YOU FROM MEDICAL APPOINTMENTS, MEETINGS, WORK OR FROM GETTING THINGS NEEDED FOR DAILY LIVING?: NO

## 2024-11-19 SDOH — ECONOMIC STABILITY: TRANSPORTATION INSECURITY
IN THE PAST 12 MONTHS, HAS THE LACK OF TRANSPORTATION KEPT YOU FROM MEDICAL APPOINTMENTS OR FROM GETTING MEDICATIONS?: NO

## 2024-11-19 ASSESSMENT — COGNITIVE AND FUNCTIONAL STATUS - GENERAL
WALKING IN HOSPITAL ROOM: A LITTLE
CLIMB 3 TO 5 STEPS WITH RAILING: A LITTLE
DAILY ACTIVITIY SCORE: 23
SUGGESTED CMS G CODE MODIFIER MOBILITY: CJ
DRESSING REGULAR LOWER BODY CLOTHING: A LITTLE
SUGGESTED CMS G CODE MODIFIER DAILY ACTIVITY: CI
MOBILITY SCORE: 22

## 2024-11-19 ASSESSMENT — ENCOUNTER SYMPTOMS
COUGH: 0
NAUSEA: 0
PALPITATIONS: 0
CHILLS: 0
ABDOMINAL PAIN: 0
VOMITING: 0
FEVER: 0
SHORTNESS OF BREATH: 0

## 2024-11-19 ASSESSMENT — SOCIAL DETERMINANTS OF HEALTH (SDOH)
WITHIN THE PAST 12 MONTHS, YOU WORRIED THAT YOUR FOOD WOULD RUN OUT BEFORE YOU GOT THE MONEY TO BUY MORE: NEVER TRUE
WITHIN THE LAST YEAR, HAVE YOU BEEN HUMILIATED OR EMOTIONALLY ABUSED IN OTHER WAYS BY YOUR PARTNER OR EX-PARTNER?: NO
WITHIN THE PAST 12 MONTHS, THE FOOD YOU BOUGHT JUST DIDN'T LAST AND YOU DIDN'T HAVE MONEY TO GET MORE: NEVER TRUE
IN THE PAST 12 MONTHS, HAS THE ELECTRIC, GAS, OIL, OR WATER COMPANY THREATENED TO SHUT OFF SERVICE IN YOUR HOME?: NO
WITHIN THE LAST YEAR, HAVE TO BEEN RAPED OR FORCED TO HAVE ANY KIND OF SEXUAL ACTIVITY BY YOUR PARTNER OR EX-PARTNER?: NO
WITHIN THE LAST YEAR, HAVE YOU BEEN AFRAID OF YOUR PARTNER OR EX-PARTNER?: NO
WITHIN THE LAST YEAR, HAVE YOU BEEN KICKED, HIT, SLAPPED, OR OTHERWISE PHYSICALLY HURT BY YOUR PARTNER OR EX-PARTNER?: NO

## 2024-11-19 ASSESSMENT — LIFESTYLE VARIABLES
HAVE PEOPLE ANNOYED YOU BY CRITICIZING YOUR DRINKING: NO
ON A TYPICAL DAY WHEN YOU DRINK ALCOHOL HOW MANY DRINKS DO YOU HAVE: 5
HAVE YOU EVER FELT YOU SHOULD CUT DOWN ON YOUR DRINKING: NO
AVERAGE NUMBER OF DAYS PER WEEK YOU HAVE A DRINK CONTAINING ALCOHOL: 2
EVER HAD A DRINK FIRST THING IN THE MORNING TO STEADY YOUR NERVES TO GET RID OF A HANGOVER: NO
DOES PATIENT WANT TO STOP DRINKING: NO
HOW MANY TIMES IN THE PAST YEAR HAVE YOU HAD 5 OR MORE DRINKS IN A DAY: 10
ALCOHOL_USE: YES
EVER FELT BAD OR GUILTY ABOUT YOUR DRINKING: NO
TOTAL SCORE: 0
CONSUMPTION TOTAL: POSITIVE
TOTAL SCORE: 0
TOTAL SCORE: 0

## 2024-11-19 ASSESSMENT — PAIN DESCRIPTION - PAIN TYPE
TYPE: ACUTE PAIN

## 2024-11-19 ASSESSMENT — COPD QUESTIONNAIRES
DURING THE PAST 4 WEEKS HOW MUCH DID YOU FEEL SHORT OF BREATH: NONE/LITTLE OF THE TIME
DO YOU EVER COUGH UP ANY MUCUS OR PHLEGM?: YES, EVERY DAY
HAVE YOU SMOKED AT LEAST 100 CIGARETTES IN YOUR ENTIRE LIFE: YES
IN THE PAST 12 MONTHS DO YOU DO LESS THAN YOU USED TO BECAUSE OF YOUR BREATHING PROBLEMS: DISAGREE/UNSURE
COPD SCREENING SCORE: 6

## 2024-11-19 NOTE — PROGRESS NOTES
4 Eyes Skin Assessment Completed by  Bennett RN and EZRA Tee.    Head WDL  Ears WDL  Nose WDL  Mouth WDL  Neck WDL  Breast/Chest WDL  Shoulder Blades WDL  Spine WDL  (R) Arm/Elbow/Hand Abrasion  (L) Arm/Elbow/Hand WDL  Abdomen WDL  Groin WDL  Scrotum/Coccyx/Buttocks Redness  (R) Leg WDL  (L) Leg WDL  (R) Heel/Foot/Toe Discoloration, , Black, Necrotic wound  (L) Heel/Foot/Toe WDL          Devices In Places Blood Pressure Cuff and Pulse Ox      Interventions In Place Pillows    Possible Skin Injury Yes    Pictures Uploaded Into Epic Yes  Wound Consult Placed N/A  RN Wound Prevention Protocol Ordered No

## 2024-11-19 NOTE — ED NOTES
Patient brought by Er Tech via wheelchair  from triage waiting room, alert/ oriented x 4.Verified patient identification.  Assumed patient care.   Placed on patient room. Changed clothes to hospital gown. Connected to cardiac monitor.   Given the call light and instructed to call for any assistance needed/ or concerns.   Bed on lowest position, side rails up, breaks locked. Awaiting for ERP.

## 2024-11-19 NOTE — ASSESSMENT & PLAN NOTE
At [facility], x-ray presented osteomyelitis as well as some soft tissue gas on the right foot.  Ultrasound presented significant peripheral artery disease.  Received 1 dose of ceftriaxone and vancomycin.  Transferred to [facility] for vascular consult.  At [facility], CTA lower extremity presented extensive bilateral arthrosclerotic plaque and occlusion of right superficial femoral artery, greater then 70% stenosis of the distal right external iliac artery and multiple stenosis.  ED physician contacted vascular surgeon who is planning to evaluate tomorrow 11/19/2024.  Received 1 dose of metronidazole  -Admitted Med/Surg floor  -Unasyn and linezolid  -Follow-up blood and wound culture collected on 11/19-20/2024   Vascular surgery consulted,   Patient underwent Right common femoral and profunda femoral endarterectomy and  Right common femoral to below knee popliteal bypass using reversed right greater saphenous vein.   Status post transmetatarsal amputation with Ortho.   PT OT

## 2024-11-19 NOTE — ED NOTES
Med rec updated and complete. Allergies reviewed.      Pt confirmed name and date of birth.    No anticoagulant medications last ASA dose   11/18/24.    No outpatient antibiotic use in last 30 days.    No OTC or supplement medications.      Preferred Pharmacy  [facility] [phone]

## 2024-11-19 NOTE — CONSULTS
VASCULAR SURGERY SERVICE  CONSULT NOTE      Date: 11/19/2024    Referring Provider: Marcial Daigle M.d.    Consulting Physician: José Luis Flores MD - [facility]     -------------------------------------------------------------------------------------------------    Reason for consultation:  Peripheral arterial disease.    HPI:  This is a 68 y.o. male with multiple medical problem as well as alcohol and tobacco use who was transferred from outside hospital to [facility] for gangrenous right fourth and fifth toes.  CTA showed multilevel arterial occlusive disease.  Patient was admitted.  Vascular service is consulted.    Past Medical History:   Diagnosis Date    CVA (cerebral vascular accident) (HCC)     H/O mumps     High cholesterol     History of chickenpox     Hyperlipidemia     Hypertension        Past Surgical History:   Procedure Laterality Date    ORIF, HAND  2013    Right hand        Current Facility-Administered Medications   Medication Dose Route Frequency Provider Last Rate Last Admin    atorvastatin (Lipitor) tablet 80 mg  80 mg Oral Q EVENING Prince RACHEL Grant D.O.        lisinopril (Prinivil) tablet 30 mg  30 mg Oral DAILY OMA ReillyO.   30 mg at 11/19/24 0509    metoprolol tartrate (Lopressor) tablet 12.5 mg  12.5 mg Oral BID MARISSA Reilly.O.   12.5 mg at 11/19/24 0509    ampicillin/sulbactam (Unasyn) 3 g in  mL IVPB  3 g Intravenous Q6HRS Prince RACHEL Grant D.O.   Stopped at 11/19/24 0547    linezolid (Zyvox) tablet 600 mg  600 mg Oral Q12HRS OMA ReillyO.   600 mg at 11/19/24 0110    acetaminophen (Tylenol) tablet 650 mg  650 mg Oral Q6HRS PRN Prince RACHEL Grant D.O.        Pharmacy Consult Request ...Pain Management Review 1 Each  1 Each Other PHARMACY TO DOSE Prince RACHEL Grant D.O.        oxyCODONE immediate-release (Roxicodone) tablet 2.5 mg  2.5 mg Oral Q6HRS PRN Prince RACHEL Grant D.O.        Or    oxyCODONE immediate-release (Roxicodone) tablet 5 mg  5 mg Oral Q6HRS PRN Prince RACHEL Grant,  D.O.        Or    HYDROmorphone (Dilaudid) injection 0.25 mg  0.25 mg Intravenous Q4HRS PRN Prince RACHEL Grant D.O.        senna-docusate (Pericolace Or Senokot S) 8.6-50 MG per tablet 2 Tablet  2 Tablet Oral Q EVENING Prince RACHEL Grant D.O.        And    polyethylene glycol/lytes (Miralax) Packet 1 Packet  1 Packet Oral QDAY PRN Prince RACHEL Grant D.O.        labetalol (Normodyne/Trandate) injection 10 mg  10 mg Intravenous Q4HRS PRN Prince RACHEL Grant D.O.        ondansetron (Zofran ODT) dispertab 4 mg  4 mg Oral Q4HRS PRN Prince RACHEL Grant D.O.        nicotine (Nicoderm) 21 MG/24HR 21 mg  21 mg Transdermal Daily-0600 Prince RACHEL Grant D.O.   21 mg at 11/19/24 0521    And    nicotine polacrilex (Nicorette) 2 MG piece 2 mg  2 mg Oral Q HOUR PRN Prince RACHEL Grant D.O.           Social History     Socioeconomic History    Marital status: Single     Spouse name: Not on file    Number of children: Not on file    Years of education: Not on file    Highest education level: Not on file   Occupational History    Not on file   Tobacco Use    Smoking status: Every Day     Current packs/day: 0.50     Average packs/day: 0.3 packs/day for 56.9 years (14.9 ttl pk-yrs)     Types: Cigarettes     Start date: 1968    Smokeless tobacco: Never   Vaping Use    Vaping status: Never Used   Substance and Sexual Activity    Alcohol use: Yes     Alcohol/week: 12.6 oz     Types: 21 Cans of beer per week     Comment: 3 beers per day    Drug use: No    Sexual activity: Never   Other Topics Concern    Not on file   Social History Narrative    Not on file     Social Drivers of Health     Financial Resource Strain: Not on file   Food Insecurity: No Food Insecurity (11/19/2024)    Hunger Vital Sign     Worried About Running Out of Food in the Last Year: Never true     Ran Out of Food in the Last Year: Never true   Transportation Needs: No Transportation Needs (11/19/2024)    PRAPARE - Transportation     Lack of Transportation (Medical): No     Lack of Transportation  "(Non-Medical): No   Physical Activity: Not on file   Stress: Not on file   Social Connections: Not on file   Intimate Partner Violence: Not At Risk (11/19/2024)    Humiliation, Afraid, Rape, and Kick questionnaire     Fear of Current or Ex-Partner: No     Emotionally Abused: No     Physically Abused: No     Sexually Abused: No   Housing Stability: Low Risk  (11/19/2024)    Housing Stability Vital Sign     Unable to Pay for Housing in the Last Year: No     Number of Times Moved in the Last Year: 0     Homeless in the Last Year: No       Family History   Problem Relation Age of Onset    Diabetes Mother     Breast Cancer Mother     Heart Disease Father     Cancer Father     Adjustment disorder with mixed anxiety and depressed mood Father     Hypertension Sister     Stroke Other        Allergies:  Patient has no known allergies.    Review of Systems:    Constitutional: Negative for fever, chills, weight loss,   HENT:    Negative for hearing loss or tinnitus    Eyes:    Negative for blurred vision, double vision, or loss of vision  Respiratory:  Negative for cough, hemoptysis, or wheezing    Cardiac:  Negative for chest pain or palpitations or orthopnea  Vascular:  Negative for claudication or rest pain   Gastrointestinal: Negative for nausea, vomiting, or abdominal pain     Negative for hematochezia or melena   Genitourinary: Negative for dysuria, frequency, or hematuria   Musculoskeletal: Negative for myalgias, back pain, or joint pain  Skin:   Negative for itching or rash  Neurological:  Negative for dizziness, headaches, or tremors     Negative for speech disturbance     Positive for decreased sensation in distal right foot   Endo/Heme:  Negative for easy bruising or bleeding  Psychiatric:  Negative for depression, suicidal ideas, or hallucinations    Physical Exam:  /76   Pulse (!) 58   Temp 36.5 °C (97.7 °F) (Temporal)   Resp 18   Ht 1.981 m (6' 6\")   Wt 82.5 kg (181 lb 14.1 oz)   SpO2 93% "     Constitutional: Alert, oriented, no acute distress  HEENT:  Normocephalic and atraumatic, EOMI  Neck:   Supple, no JVD  Cardiovascular: Regular rate and rhythm  Pulmonary:  Good air entry bilaterally  Abdominal:  Soft, non-tender, non-distended     Aortic impulse not widened  Musculoskeletal: No edema, no tenderness  Neurological:  CN II-XII grossly intact, no focal deficits  Skin:   Skin is warm and dry. No rash noted.  Psychiatric:  Normal mood and affect.  Lower extremities: Palpable bilateral common femoral pulses but weak, left common femoral pulses stronger than right.  Pedal pulses are not palpable on either side.  Gangrenous, shrunken right fourth and fifth toes with malodor and mild surrounding erythema.    Labs:  Recent Labs     11/18/24 1402 11/18/24 1958 11/19/24  0830   WBC 10.9* 12.5* 10.9*   RBC 4.65* 4.76 4.12*   HEMOGLOBIN 15.2 15.4 13.4*   HEMATOCRIT 43.5 44.8 38.2*   MCV 93.5 94.1 92.7   MCH 32.7* 32.4 32.5   MCHC 34.9 34.4 35.1   RDW 15.3* 53.1* 51.5*   PLATELETCT 310 334 274   MPV 8.8 8.9* 8.8*     Recent Labs     11/18/24  1402 11/18/24 1958 11/19/24  0830   SODIUM 136 135 134*   POTASSIUM 3.9 4.1 3.9   CHLORIDE 101 99 101   CO2 25 22 21   GLUCOSE 138* 140* 109*   BUN 18 16 11   CREATININE 1.2 0.86 0.72   CALCIUM 9.4 9.0 8.7         Recent Labs     11/18/24  1402 11/18/24 1958 11/19/24  0830   ASTSGOT 6* 15 14   ALTSGPT 14 8 5   TBILIRUBIN 0.4 0.3 0.5   ALKPHOSPHAT 103 102* 83   GLOBULIN  --  4.0* 3.4       Radiology:  Reviewed.    Assessment:  -  Peripheral arterial disease with gangrenous right 4th and 5th toes.  -  Tobacco use.  -  Dyslipidemia.  -  Hypertension.    Plan:  I had a long discussion with patient.  Study results were reviewed with him.  I recommended that he undergoes angiogram with possible endovascular intervention if feasible and appropriate to improve circulation to his right foot.  If the endovascular intervention is not feasible, patient will need to undergo right  lower extremity bypass, if there is a distal target.  Risks of procedures were discussed which include, but not limited to, bleeding, infection, contrast reaction, contrast-induced renal failure, seroma formation, seroma leak, and perioperative anesthetic complications.  Patient indicated understanding and would like to proceed.  All questions were answered.  I plan to perform the angiogram tomorrow and bypass, if needed, on Thursday.  Following the revascularization procedure, patient will need to have the gangrenous right fourth and fifth toes amputated.  Orthopedic service was already consulted for this.    Recommend dry gauzes in between toes and painting the gangrenous sites with Betadine once a day in the meantime.  Patient would also benefit from Prevalon boots to keep pressure off his heels to prevent ulceration as he has bilateral lower extremity arterial occlusive disease.    Patient was counseled to stop smoking.      José Luis Flores MD  [facility]   [place] preferred or call my office [phone]  __________________________________________________________________  Patient:Arnulfo Crocker   MRN:[id number]   CSN:[id number]

## 2024-11-19 NOTE — DISCHARGE PLANNING
Met with pt who lives alone. He reports being independent with ADLs and IADLs. Stated that he has more than an acre property and he does all the yard work. He has a walker, crutches amd cane but does not use them. He has no home O2.     PCP os PATRIA Guillaumedorina Hansen  Pharmacy is [facility] in [place].     Care Transition Team Assessment    Information Source  Orientation Level: Oriented X4  Information Given By: Patient  Who is responsible for making decisions for patient? : Patient    Readmission Evaluation  Is this a readmission?: No    Elopement Risk  Legal Hold: No  Ambulatory or Self Mobile in Wheelchair: Yes  Disoriented: No  Psychiatric Symptoms: None  History of Wandering: No  Elopement this Admit: No  Vocalizing Wanting to Leave: No  Displays Behaviors, Body Language Wanting to Leave: No-Not at Risk for Elopement  Elopement Risk: Not at Risk for Elopement    Interdisciplinary Discharge Planning  Does Admitting Nurse Feel This Could be a Complex Discharge?: No  Primary Care Physician: PATRIA Guillaume Johnstonloi  Lives with - Patient's Self Care Capacity: Alone and Able to Care For Self  Patient or legal guardian wants to designate a caregiver: No  Support Systems: Family Member(s)  Housing / Facility: [address]  Do You Take your Prescribed Medications Regularly: Yes  Able to Return to Previous ADL's: Yes  Mobility Issues: No  Prior Services: Home-Independent  Patient Prefers to be Discharged to:: Home  Assistance Needed: No    Discharge Preparedness  What is your plan after discharge?: Home with help  What are your discharge supports?: Sibling  Prior Functional Level: Independent with Activities of Daily Living  Difficulity with ADLs: None  Difficulity with IADLs: None    Functional Assesment  Prior Functional Level: Independent with Activities of Daily Living    Finances  Financial Barriers to Discharge: No  Prescription Coverage: Yes    Vision / Hearing Impairment  Vision Impairment : Yes  Right Eye Vision:  Impaired, Wears Glasses  Left Eye Vision: Impaired, Wears Glasses  Hearing Impairment : No    Values / Beliefs / Concerns  Values / Beliefs Concerns : No    Advance Directive  Advance Directive?: None    Domestic Abuse  Have you ever been the victim of abuse or violence?: No         Discharge Risks or Barriers  Discharge risks or barriers?: Post-acute placement / services  Patient risk factors: Cognitive / sensory / physical deficit    Anticipated Discharge Information  Discharge Disposition: Discharged to home/self care (01)

## 2024-11-19 NOTE — PROGRESS NOTES
Hospital Medicine Daily Progress Note    Date of Service  11/19/2024    Chief Complaint  Arnulfo Crocker is a 68 y.o. male admitted 11/18/2024 with right foot pain    Hospital Course  68-year-old male with past medical history of CVA, hypertension, and tobacco use who presents due to progressive wound of his right foot complicated by gangrene over the lateral aspect of the foot with necrosis of fourth and fifth digits. CT of the lower extremity with multiple areas of arterial stenosis. Vascular surgery was consulted. X-ray of the right foot demonstrating osteomyelitis.    Interval Problem Update  Patient noted discoloration of his right foot about 4 weeks ago. He has PAD and continues to smoke. CT reveals significant by bilateral PAD. Vascular surgery consulted.  I discussed the case with vascular surgery, plan for angiogram.  I consulted orthopedics who will evaluate the patient.  Continue IV abx for osteomyelitis.     I have discussed this patient's plan of care and discharge plan at IDT rounds today with Case Management, Nursing, Nursing leadership, and other members of the IDT team.    Consultants/Specialty  orthopedics and vascular surgery    Code Status  DNAR/DNI    Disposition  The patient is not medically cleared for discharge to home or a post-acute facility.      I have placed the appropriate orders for post-discharge needs.    Review of Systems  Review of Systems   Skin:         Right foot with necrotic fourth and fifth toes        Physical Exam  Temp:  [35.9 °C (96.7 °F)-36.9 °C (98.4 °F)] 36.5 °C (97.7 °F)  Pulse:  [] 58  Resp:  [16-18] 18  BP: (116-164)/(69-91) 131/76  SpO2:  [93 %-97 %] 93 %    Physical Exam  Vitals and nursing note reviewed.   Constitutional:       General: He is not in acute distress.  HENT:      Head: Normocephalic.      Mouth/Throat:      Mouth: Mucous membranes are moist.   Eyes:      Pupils: Pupils are equal, round, and reactive to light.   Cardiovascular:      Rate and  Rhythm: Normal rate and regular rhythm.      Pulses: Normal pulses.      Heart sounds: Normal heart sounds.   Pulmonary:      Effort: Pulmonary effort is normal.      Breath sounds: Normal breath sounds.   Abdominal:      Palpations: Abdomen is soft.      Tenderness: There is no abdominal tenderness.   Musculoskeletal:         General: No swelling.      Cervical back: Neck supple.      Comments: Right foot redness and necrotic  appearing 4th and 5th digits    Skin:     General: Skin is warm.      Coloration: Skin is not jaundiced.   Neurological:      General: No focal deficit present.      Mental Status: He is alert and oriented to person, place, and time.   Psychiatric:         Mood and Affect: Mood normal.         Behavior: Behavior normal.         Fluids    Intake/Output Summary (Last 24 hours) at 11/19/2024 0843  Last data filed at 11/19/2024 0759  Gross per 24 hour   Intake --   Output 650 ml   Net -650 ml        Laboratory  Recent Labs     11/18/24  1402 11/18/24 1958   WBC 10.9* 12.5*   RBC 4.65* 4.76   HEMOGLOBIN 15.2 15.4   HEMATOCRIT 43.5 44.8   MCV 93.5 94.1   MCH 32.7* 32.4   MCHC 34.9 34.4   RDW 15.3* 53.1*   PLATELETCT 310 334   MPV 8.8 8.9*     Recent Labs     11/18/24  1402 11/18/24 1958   SODIUM 136 135   POTASSIUM 3.9 4.1   CHLORIDE 101 99   CO2 25 22   GLUCOSE 138* 140*   BUN 18 16   CREATININE 1.2 0.86   CALCIUM 9.4 9.0                   Imaging  CT-CTA LOWER EXT WITH & W/O-POST PROCESS RIGHT   Final Result         1.  Extensive bilateral atherosclerotic plaque.   2.  Occlusion of the right superficial femoral artery which reconstitutes distally. Greater than 70% stenosis of the distal right superficial femoral artery.   3.  Greater than 70% stenosis of the distal right external iliac artery,   4.  50-70% stenosis of the right tibioperoneal trunk with three-vessel runoff at the right ankle.   5.  Greater than 70% stenosis of the left distal external iliac artery.   6.  50-70% stenosis of the  distal left superficial femoral artery   7.  50-70% stenosis of the left popliteal artery.   8.  Greater than 70% stenosis of the left tibioperoneal trunk with occlusion of the proximal posterior tibial artery. Small caliber left posterior tibial artery is seen distally otherwise there appears to be three-vessel runoff of the left ankle.   9.  Soft tissue wound of the right fourth and fifth toes with air in the fifth toe bony structures, compatible with osteomyelitis   10.  Soft tissue edema of the right leg below the knee.      These findings were discussed with the patient's clinician, Luiz Richardson, on 11/18/2024 10:07 PM.      DX-CHEST-PORTABLE (1 VIEW)   Final Result         1.  Right basilar atelectasis and/or subtle infiltrates.   2.  Trace right pleural effusion   3.  Right rib fractures are seen, appear likely subacute           Assessment/Plan  * Osteomyelitis of Right 4th and 5th toe(HCC)- (present on admission)  Assessment & Plan  At [facility], x-ray presented osteomyelitis as well as some soft tissue gas on the right foot.  Ultrasound presented significant peripheral artery disease.  Received 1 dose of ceftriaxone and vancomycin.  Transferred to [facility] for vascular consult.  At [facility], CTA lower extremity presented extensive bilateral arthrosclerotic plaque and occlusion of right superficial femoral artery, greater then 70% stenosis of the distal right external iliac artery and multiple stenosis.  ED physician contacted vascular surgeon who is planning to evaluate tomorrow 11/19/2024.  Received 1 dose of metronidazole  -Admitted Med/Surg floor  -Unasyn and linezolid  -Follow-up blood and wound culture collected on 11/19-20/2024   -N.p.o. at midnight due to possible amputation tomorrow 11/19/2024    Hyperlipidemia  Assessment & Plan  -Continue home atorvastatin    SIRS (systemic inflammatory response syndrome) (HCC)  Assessment & Plan  SIRS criteria identified on my evaluation include:   Tachycardia, with heart rate greater than 90 BPM and Leukocytosis, with WBC greater than 12,000  SIRS is non-infectious, the patient does not have sepsis  Elevated C-reactive, likely reactive leukocytosis secondary to osteomyelitis    Left-sided cerebrovascular accident (CVA) (HCC)- (present on admission)  Assessment & Plan  Left frontal lobe/insular infarct 7/2/2022  -Hold home aspirin due to possible amputation scheduled on 1119/24    Tobacco use disorder  Assessment & Plan  - Nicotine patch placed    Essential hypertension- (present on admission)  Assessment & Plan  -Continue home lisinopril and metoprolol tartrate         VTE prophylaxis:   SCDs/TEDs   enoxaparin ppx      I have performed a physical exam and reviewed and updated ROS and Plan today (11/19/2024). In review of yesterday's note (11/18/2024), there are no changes except as documented above.    I spent 51 minutes, reviewing the chart, obtaining and/or reviewing separately obtained history. Performing a medically appropriate examination and evaluation.  Counseling and educating the patient. Ordering and reviewing medications, tests, or procedures.  Discussing the case with Ortho and Vascular surgery.  Documenting clinical information in EPIC. Independently interpreting results and communicating results to patient. Discussing future disposition of care with patient, RN and case management.

## 2024-11-19 NOTE — CARE PLAN
The patient is Stable - Low risk of patient condition declining or worsening    Problem: Knowledge Deficit - Standard  Goal: Patient and family/care givers will demonstrate understanding of plan of care, disease process/condition, diagnostic tests and medications  Outcome: Progressing

## 2024-11-19 NOTE — ED TRIAGE NOTES
Chief Complaint   Patient presents with    Sent by MD     Pt sent by [facility] for R foot necrosis , per outside facility pt has osteomyelitis . Pt presents with 3rd and 4th toes on R foot that appear black and necrotic. Pt denies any pain.

## 2024-11-19 NOTE — H&P
[facility] Internal [facility] History & Physical Note    Date of Service  11/18/24    [facility] Team: Night float  Attending: Jimi Oleary D.o.  Resident: Dr. Prince Grant  Contact Number: [phone]    Primary Care Physician  Guillaume Hansen P.A.-C.    Consultants  vascular surgery    Code Status  DNAR/DNI    Chief Complaint  Chief Complaint   Patient presents with    Sent by MD     Pt sent by [facility] for R foot necrosis , per outside facility pt has osteomyelitis . Pt presents with 3rd and 4th toes on R foot that appear black and necrotic. Pt denies any pain.         History of Presenting Illness (HPI):   Arnulfo Crocker is a 68 y.o. male with medical history of Left frontal lobe/insular infarct 7/2/2022, hypertension, hyperlipidemia and current smoker who presented black right fourth and fifth toes on 11/18/2024.    Patient stated he bumped his right foot on the bed about 4 to 5 weeks ago.  He found out his toe was more swollen and redness.  About 2 weeks ago, his fourth and fifth toes became black with discharges.  He applied Neosporin, Iodide and hydrogen peroxide that did not help.  Also developed worsening smelling.  He talked to his sister who recommended him to be evaluated to the hospital.  He went to [facility] urgency room.  Denies fever, chills, chest pain, palpitation, shortness of breath, cough, abdominal pain, constipation, diarrhea and dysuria.    At [facility], x-ray presented osteomyelitis as well as some soft tissue gas on the right foot.  ED physician discussed with orthopedist.  Ultrasound presented significant peripheral artery disease.   Received 1 dose of ceftriaxone and vancomycin. Orthopedist recommended vascular consult which is not available at [facility], transferred to [facility].     At [facility], vital signs was remarkable for tachycardia 106, hypertension 159/91 but afebrile and saturating well at room air.  CBC presented leukocytosis of 12.5.  CMP was  relatively unremarkable.  Lactic acid was within normal limit.  Elevated C-reactive 3.2. CXR presented bibasilar atelectasis versus infiltrates with trace right pleural effusion.  CTA lower extremity presented extensive bilateral arthrosclerotic plaque and occlusion of right superficial femoral artery, greater then 70% stenosis of the distal right external iliac artery and multiple stenosis.  At ED, received 1 dose of metronidazole.  Patient was admitted for gangrenous osteomyelitis of right fourth and fifth toes     I discussed the plan of care with patient.    Review of Systems  Review of Systems   Constitutional:  Negative for chills and fever.   Respiratory:  Negative for cough and shortness of breath.    Cardiovascular:  Negative for chest pain and palpitations.   Gastrointestinal:  Negative for abdominal pain, nausea and vomiting.   Genitourinary:  Negative for dysuria and hematuria.       Past Medical History   has a past medical history of CVA (cerebral vascular accident) (HCC), H/O mumps, High cholesterol, History of chickenpox, Hyperlipidemia, and Hypertension.    Surgical History   has a past surgical history that includes orif, hand (2013).     Family History  family history includes Adjustment disorder with mixed anxiety and depressed mood in his father; Breast Cancer in his mother; Cancer in his father; Diabetes in his mother; Heart Disease in his father; Hypertension in his sister; Stroke in an other family member.   Family history reviewed with patient.     Social History  Tobacco: smokes 0.75 -1 pack since age of 11  Alcohol: Social drinker, 2 beers during the weekend  Recreational drugs (illegal or prescription): Denies  Employment: Retired  Living Situation: Living by himself  Recent Travel: Denies    Allergies  No Known Allergies    Medications  Prior to Admission Medications   Prescriptions Last Dose Informant Patient Reported? Taking?   aspirin 81 MG EC tablet 11/18/2024 at  7:30 AM  No Yes    Sig: Take 1 Tablet by mouth every day.   atorvastatin (LIPITOR) 80 MG tablet 11/17/2024 at 10:00 PM  No Yes   Sig: Take 1 tablet by mouth every night   lisinopril (PRINIVIL) 30 MG tablet 11/18/2024 at  7:30 AM  No Yes   Sig: Take 1 Tablet by mouth every day.   metoprolol tartrate (LOPRESSOR) 25 MG Tab 11/18/2024 at  7:30 AM  No Yes   Sig: Take 1/2 tablet by mouth twice a day      Facility-Administered Medications: None       Physical Exam  Temp:  [35.9 °C (96.7 °F)-36.7 °C (98.1 °F)] 36.7 °C (98.1 °F)  Pulse:  [] 78  Resp:  [16-18] 16  BP: (116-164)/(69-91) 164/81  SpO2:  [93 %-97 %] 96 %  Blood Pressure : 137/89   Temperature: 36.4 °C (97.6 °F)   Pulse: 76   Respiration: 16   Pulse Oximetry: 94 %       Physical Exam  Constitutional:       General: He is not in acute distress.     Appearance: Normal appearance.   HENT:      Head: Normocephalic and atraumatic.      Mouth/Throat:      Mouth: Mucous membranes are moist.      Pharynx: Oropharynx is clear. No oropharyngeal exudate.   Eyes:      Extraocular Movements: Extraocular movements intact.      Pupils: Pupils are equal, round, and reactive to light.   Cardiovascular:      Rate and Rhythm: Normal rate and regular rhythm.      Heart sounds: No murmur heard.     Comments: Absence of tibial and dorsali pedal pulse  Pulmonary:      Effort: Pulmonary effort is normal. No respiratory distress.      Breath sounds: Normal breath sounds. No wheezing.   Abdominal:      General: Abdomen is flat.      Palpations: Abdomen is soft.      Tenderness: There is no abdominal tenderness. There is no guarding or rebound.   Musculoskeletal:         General: Normal range of motion.      Left lower leg: No edema.   Skin:     Comments: Cold LLE. 4th and 5th right toe w/ purulent discharge    Neurological:      Mental Status: He is alert and oriented to person, place, and time.      Comments: Decree sensation on right foot         Laboratory:  Recent Labs     11/18/24  1402  "11/18/24 1958   WBC 10.9* 12.5*   RBC 4.65* 4.76   HEMOGLOBIN 15.2 15.4   HEMATOCRIT 43.5 44.8   MCV 93.5 94.1   MCH 32.7* 32.4   MCHC 34.9 34.4   RDW 15.3* 53.1*   PLATELETCT 310 334   MPV 8.8 8.9*     Recent Labs     11/18/24  1402 11/18/24 1958   SODIUM 136 135   POTASSIUM 3.9 4.1   CHLORIDE 101 99   CO2 25 22   GLUCOSE 138* 140*   BUN 18 16   CREATININE 1.2 0.86   CALCIUM 9.4 9.0     Recent Labs     11/18/24  1402 11/18/24 1958   ALTSGPT 14 8   ASTSGOT 6* 15   ALKPHOSPHAT 103 102*   TBILIRUBIN 0.4 0.3   GLUCOSE 138* 140*         No results for input(s): \"NTPROBNP\" in the last 72 hours.      No results for input(s): \"TROPONINT\" in the last 72 hours.    Imaging:  CT-CTA LOWER EXT WITH & W/O-POST PROCESS RIGHT   Final Result         1.  Extensive bilateral atherosclerotic plaque.   2.  Occlusion of the right superficial femoral artery which reconstitutes distally. Greater than 70% stenosis of the distal right superficial femoral artery.   3.  Greater than 70% stenosis of the distal right external iliac artery,   4.  50-70% stenosis of the right tibioperoneal trunk with three-vessel runoff at the right ankle.   5.  Greater than 70% stenosis of the left distal external iliac artery.   6.  50-70% stenosis of the distal left superficial femoral artery   7.  50-70% stenosis of the left popliteal artery.   8.  Greater than 70% stenosis of the left tibioperoneal trunk with occlusion of the proximal posterior tibial artery. Small caliber left posterior tibial artery is seen distally otherwise there appears to be three-vessel runoff of the left ankle.   9.  Soft tissue wound of the right fourth and fifth toes with air in the fifth toe bony structures, compatible with osteomyelitis   10.  Soft tissue edema of the right leg below the knee.      These findings were discussed with the patient's clinician, Luiz Richardson, on 11/18/2024 10:07 PM.      DX-CHEST-PORTABLE (1 VIEW)   Final Result         1.  Right basilar " atelectasis and/or subtle infiltrates.   2.  Trace right pleural effusion   3.  Right rib fractures are seen, appear likely subacute            Assessment/Plan:  Problem Representation:   Arnulfo Crocker is a 68 y.o. male with medical history of Left frontal lobe/insular infarct 7/2/2022, hypertension, hyperlipidemia and current smoker who was admitted with steomyelitis on the right fourth and fifth toe.    I anticipate this patient will require at least two midnights for appropriate medical management, necessitating inpatient admission because osteomyelitis on the right fourth and fifth toe    Patient will need a Med/Surg bed on MEDICAL service .  The need is secondary to Osteomyelitis on the right fourth and fifth toe.    * Osteomyelitis of Right 4th and 5th toe(HCC)- (present on admission)  Assessment & Plan  At [facility], x-ray presented osteomyelitis as well as some soft tissue gas on the right foot.  Ultrasound presented significant peripheral artery disease.  Received 1 dose of ceftriaxone and vancomycin.  Transferred to [facility] for vascular consult.  At [facility], CTA lower extremity presented extensive bilateral arthrosclerotic plaque and occlusion of right superficial femoral artery, greater then 70% stenosis of the distal right external iliac artery and multiple stenosis.  ED physician contacted vascular surgeon who is planning to evaluate tomorrow 11/19/2024.  Received 1 dose of metronidazole  -Admitted Med/Surg floor  -Unasyn and linezolid  -Follow-up blood and wound culture collected on 11/19-20/2024   -N.p.o. at midnight due to possible amputation tomorrow 11/19/2024    SIRS (systemic inflammatory response syndrome) ([facility])  Assessment & Plan  SIRS criteria identified on my evaluation include:  Tachycardia, with heart rate greater than 90 BPM and Leukocytosis, with WBC greater than 12,000  SIRS is non-infectious, the patient does not have sepsis  Elevated C-reactive, likely reactive leukocytosis  secondary to osteomyelitis    Left-sided cerebrovascular accident (CVA) (HCC)- (present on admission)  Assessment & Plan  Left frontal lobe/insular infarct 7/2/2022  -Hold home aspirin due to possible amputation scheduled on 1119/24    Hyperlipidemia  Assessment & Plan  -Continue home atorvastatin    Tobacco use disorder  Assessment & Plan  - Nicotine patch placed    Essential hypertension- (present on admission)  Assessment & Plan  -Continue home lisinopril and metoprolol tartrate      DNR/DNI  GI prophylaxis = bowel regimen/PPI not indicated  VTE prophylaxis: SCDs/TEDs

## 2024-11-19 NOTE — ASSESSMENT & PLAN NOTE
SIRS criteria identified on my evaluation include:  Tachycardia, with heart rate greater than 90 BPM and Leukocytosis, with WBC greater than 12,000  SIRS is non-infectious, the patient does not have sepsis  Elevated C-reactive, likely reactive leukocytosis secondary to osteomyelitis

## 2024-11-19 NOTE — ASSESSMENT & PLAN NOTE
Left frontal lobe/insular infarct 7/2/2022  -Hold home aspirin due to possible amputation scheduled on 1119/24

## 2024-11-19 NOTE — ED PROVIDER NOTES
ER Provider Note    Scribed for Dr. Luiz Richardson MD. by Obde Ricketts. 11/18/2024  7:48 PM    Primary Care Provider: Guillaume Hansen P.A.-C.    CHIEF COMPLAINT  Chief Complaint   Patient presents with    Sent by MD     Pt sent by [facility] for R foot necrosis , per outside facility pt has osteomyelitis . Pt presents with 3rd and 4th toes on R foot that appear black and necrotic. Pt denies any pain.       EXTERNAL RECORDS REVIEWED  Outpatient Notes Reviewed office visit from earlier today where the patient was seen for a right foot infection and sent here for further care    HPI/ROS  LIMITATION TO HISTORY   Select: : None    OUTSIDE HISTORIAN(S):  None    Arnulfo Crocker is a 68 y.o. male who presents to the ED for evaluation of necrosis to his 4th and 5th toes onset 10 days ago. The patient reports that he stubbed the toes on a bench about 5 weeks ago. He notes having replaced the bandaging on the toes every other day, but continuing to worsen. Denies any pain. Confirms history of osteomyelitis and adds that he is still smoking cigarettes.    PAST MEDICAL HISTORY  Past Medical History:   Diagnosis Date    CVA (cerebral vascular accident) (HCC)     H/O mumps     High cholesterol     History of chickenpox     Hyperlipidemia     Hypertension      SURGICAL HISTORY  Past Surgical History:   Procedure Laterality Date    ORIF, HAND  2013    Right hand      FAMILY HISTORY  Family History   Problem Relation Age of Onset    Diabetes Mother     Breast Cancer Mother     Heart Disease Father     Cancer Father     Adjustment disorder with mixed anxiety and depressed mood Father     Hypertension Sister     Stroke Other      SOCIAL HISTORY   reports that he has been smoking cigarettes. He started smoking about 56 years ago. He has a 14.9 pack-year smoking history. He has never used smokeless tobacco. He reports current alcohol use of about 12.6 oz of alcohol per week. He reports that he does not use drugs.    CURRENT  "MEDICATIONS  Previous Medications    ASPIRIN 81 MG EC TABLET    Take 1 Tablet by mouth every day.    ATORVASTATIN (LIPITOR) 80 MG TABLET    Take 1 tablet by mouth every night    LISINOPRIL (PRINIVIL) 30 MG TABLET    Take 1 Tablet by mouth every day.    METOPROLOL TARTRATE (LOPRESSOR) 25 MG TAB    Take 1/2 tablet by mouth twice a day     ALLERGIES  Patient has no known allergies.    PHYSICAL EXAM  BP (!) 159/91   Pulse (!) 106   Temp 36.4 °C (97.6 °F) (Temporal)   Resp 16   Ht 1.981 m (6' 6\")   Wt 83.9 kg (185 lb)   SpO2 97%   BMI 21.38 kg/m²   Physical Exam  Vitals and nursing note reviewed.   Constitutional:       Appearance: He is well-developed.   HENT:      Head: Normocephalic.   Cardiovascular:      Rate and Rhythm: Normal rate and regular rhythm.      Heart sounds: No murmur heard.     Comments: DP and PT pulses not palpable on right foot. However, the DP is present with Doppler, while the PT is not present on Doppler.  Pulmonary:      Effort: Pulmonary effort is normal.      Breath sounds: Normal breath sounds.   Abdominal:      Palpations: Abdomen is soft.      Tenderness: There is no abdominal tenderness.   Musculoskeletal:         General: Normal range of motion.      Right lower leg: No edema.      Left lower leg: No edema.      Comments: Right 4th and 5th toes are black, necrotic, and mostly absent which extends down into the MTP joint.   Skin:     General: Skin is warm.   Neurological:      General: No focal deficit present.      Mental Status: He is alert and oriented to person, place, and time.       DIAGNOSTIC STUDIES & PROCEDURES    Labs:   Results for orders placed or performed during the hospital encounter of 11/18/24   Lactic Acid    Collection Time: 11/18/24  7:58 PM   Result Value Ref Range    Lactic Acid 1.6 0.5 - 2.0 mmol/L   CBC with Differential    Collection Time: 11/18/24  7:58 PM   Result Value Ref Range    WBC 12.5 (H) 4.8 - 10.8 K/uL    RBC 4.76 4.70 - 6.10 M/uL    Hemoglobin " 15.4 14.0 - 18.0 g/dL    Hematocrit 44.8 42.0 - 52.0 %    MCV 94.1 81.4 - 97.8 fL    MCH 32.4 27.0 - 33.0 pg    MCHC 34.4 32.3 - 36.5 g/dL    RDW 53.1 (H) 35.9 - 50.0 fL    Platelet Count 334 164 - 446 K/uL    MPV 8.9 (L) 9.0 - 12.9 fL    Neutrophils-Polys 79.80 (H) 44.00 - 72.00 %    Lymphocytes 11.80 (L) 22.00 - 41.00 %    Monocytes 7.30 0.00 - 13.40 %    Eosinophils 0.20 0.00 - 6.90 %    Basophils 0.50 0.00 - 1.80 %    Immature Granulocytes 0.40 0.00 - 0.90 %    Nucleated RBC 0.00 0.00 - 0.20 /100 WBC    Neutrophils (Absolute) 9.99 (H) 1.82 - 7.42 K/uL    Lymphs (Absolute) 1.47 1.00 - 4.80 K/uL    Monos (Absolute) 0.91 (H) 0.00 - 0.85 K/uL    Eos (Absolute) 0.03 0.00 - 0.51 K/uL    Baso (Absolute) 0.06 0.00 - 0.12 K/uL    Immature Granulocytes (abs) 0.05 0.00 - 0.11 K/uL    NRBC (Absolute) 0.00 K/uL   Complete Metabolic Panel    Collection Time: 11/18/24  7:58 PM   Result Value Ref Range    Sodium 135 135 - 145 mmol/L    Potassium 4.1 3.6 - 5.5 mmol/L    Chloride 99 96 - 112 mmol/L    Co2 22 20 - 33 mmol/L    Anion Gap 14.0 7.0 - 16.0    Glucose 140 (H) 65 - 99 mg/dL    Bun 16 8 - 22 mg/dL    Creatinine 0.86 0.50 - 1.40 mg/dL    Calcium 9.0 8.5 - 10.5 mg/dL    Correct Calcium 9.2 8.5 - 10.5 mg/dL    AST(SGOT) 15 12 - 45 U/L    ALT(SGPT) 8 2 - 50 U/L    Alkaline Phosphatase 102 (H) 30 - 99 U/L    Total Bilirubin 0.3 0.1 - 1.5 mg/dL    Albumin 3.8 3.2 - 4.9 g/dL    Total Protein 7.8 6.0 - 8.2 g/dL    Globulin 4.0 (H) 1.9 - 3.5 g/dL    A-G Ratio 1.0 g/dL   ESTIMATED GFR    Collection Time: 11/18/24  7:58 PM   Result Value Ref Range    GFR (CKD-EPI) 93 >60 mL/min/1.73 m 2   Urinalysis    Collection Time: 11/18/24  9:10 PM    Specimen: Urine   Result Value Ref Range    Color Yellow     Character Clear     Specific Gravity 1.016 <1.035    Ph 5.0 5.0 - 8.0    Glucose Negative Negative mg/dL    Ketones Negative Negative mg/dL    Protein Negative Negative mg/dL    Bilirubin Negative Negative    Urobilinogen, Urine 0.2  <=1.0 EU/dL    Nitrite Negative Negative    Leukocyte Esterase Negative Negative    Occult Blood Trace (A) Negative    Micro Urine Req Microscopic    URINE MICROSCOPIC (W/UA)    Collection Time: 11/18/24  9:10 PM   Result Value Ref Range    WBC 0-2 /hpf    RBC 0-2 0 - 2 /hpf    Bacteria None Seen None /hpf    Epithelial Cells 0-2 0 - 5 /hpf    Urine Casts 3-5 (A) 0 - 2 /lpf    Hyaline Cast Present /lpf     All labs reviewed by me.    Radiology:   The attending Emergency Physician has independently interpreted the diagnostic imaging associated with this visit and is awaiting the final reading from the radiologist, which will be displayed below.    Preliminary interpretation is a follows: Chest x-ray with right-sided rib fractures.  Radiologist interpretation:    CT-CTA LOWER EXT WITH & W/O-POST PROCESS RIGHT   Final Result         1.  Extensive bilateral atherosclerotic plaque.   2.  Occlusion of the right superficial femoral artery which reconstitutes distally. Greater than 70% stenosis of the distal right superficial femoral artery.   3.  Greater than 70% stenosis of the distal right external iliac artery,   4.  50-70% stenosis of the right tibioperoneal trunk with three-vessel runoff at the right ankle.   5.  Greater than 70% stenosis of the left distal external iliac artery.   6.  50-70% stenosis of the distal left superficial femoral artery   7.  50-70% stenosis of the left popliteal artery.   8.  Greater than 70% stenosis of the left tibioperoneal trunk with occlusion of the proximal posterior tibial artery. Small caliber left posterior tibial artery is seen distally otherwise there appears to be three-vessel runoff of the left ankle.   9.  Soft tissue wound of the right fourth and fifth toes with air in the fifth toe bony structures, compatible with osteomyelitis   10.  Soft tissue edema of the right leg below the knee.      These findings were discussed with the patient's clinician, Luiz Richardson, on  11/18/2024 10:07 PM.      DX-CHEST-PORTABLE (1 VIEW)   Final Result         1.  Right basilar atelectasis and/or subtle infiltrates.   2.  Trace right pleural effusion   3.  Right rib fractures are seen, appear likely subacute         COURSE & MEDICAL DECISION MAKING    INITIAL ASSESSMENT AND PLAN  Care Narrative:       7:48 PM - Patient seen and evaluated at bedside.  68-year-old male transferred from outside facility for osteomyelitis after patient stubbed his toe about 5 weeks ago and has had progressively worsening appearance of the right fourth and fifth toes since then.  On exam there is severe necrosis of the right fourth and fifth toes that is extending into the MTP joint.  There is mild surrounding erythema and warmth.  Unable to palpate pulses so will require Doppler exam.    9:00 PM - Patient was reevaluated at bedside. I discussed his rib fractures that were seen on imaging which he reports were sustained about 6 months ago and not causing him any current pain. Doppler was used at this time to determine the presence of DP and PT pulses. DP was present while PT was not.  Patient also asked about right sided chest wall pain as there are subacute rib fractures on chest x-ray.  Patient reports that incident happened many months ago and he is not currently having pain on that side at this time    9:34 PM - I reevaluated the patient at bedside. I discussed the patient's diagnostic study results which show osteomyelitis and severe peripheral arterial disease. I informed the patient of my plan to admit today given the patient's current presentation and diagnostic study results. Patient verbalizes understanding and support with my plan for admission.     2200 discussed case with vascular surgery on-call who has kindly agreed to assess the patient in the morning.    2225 PM - I discussed the patient's case and the above findings with Dr. Oleary (Hospitalist) who agreed to hospitalize the patient. Patient's care  was transferred at this time.    ADDITIONAL PROBLEM LIST AND DISPOSITION  Past Medical History:   Diagnosis Date    CVA (cerebral vascular accident) (HCC)     H/O mumps     High cholesterol     History of chickenpox     Hyperlipidemia     Hypertension                     DISPOSITION AND DISCUSSIONS  I have discussed management of the patient with the following physicians and KOLBY's: Dr. Oleary (Hospitalist)    Discussion of management with other [facility] or appropriate source(s): None     Escalation of care considered, and ultimately not performed: .    Barriers to care at this time, including but not limited to:  No known barriers to care .     Decision tools and prescription drugs considered including, but not limited to: .    Luiz Richardson M.D. spent greater than 3 minutes with the patient explaining the importance of smoking cessation.     DISPOSITION:  Patient will be hospitalized by Dr. Oleary in guarded condition.    FINAL IMPRESSION   1. Other acute osteomyelitis of right foot (HCC)    2. Leukocytosis, unspecified type    3. Gangrene (HCC)         Obed GARLAND (Scribe), am scribing for, and in the presence of, Luiz Richardson M.D..    Electronically signed by: Obed Ricketts (Scribe), 11/18/2024    ILuiz M.D. personally performed the services described in this documentation, as scribed by Obed Ricketts in my presence, and it is both accurate and complete.    The note accurately reflects work and decisions made by me.  Luiz Richardson M.D.  11/18/2024  10:32 PM

## 2024-11-19 NOTE — CARE PLAN
The patient is Stable - Low risk of patient condition declining or worsening    Shift Goals  Clinical Goals: Pain control, safety, wound culture, IV abx, NPO, comfort  Patient Goals: Comfort and rest  Family Goals: Not present    Progress made toward(s) clinical / shift goals:  yes      Problem: Knowledge Deficit - Standard  Goal: Patient and family/care givers will demonstrate understanding of plan of care, disease process/condition, diagnostic tests and medications  Description: Target End Date:  1-3 days or as soon as patient condition allows    Document in Patient Education    1.  Patient and family/caregiver oriented to unit, equipment, visitation policy and means for communicating concern  2.  Complete/review Learning Assessment  3.  Assess knowledge level of disease process/condition, treatment plan, diagnostic tests and medications  4.  Explain disease process/condition, treatment plan, diagnostic tests and medications  Outcome: Progressing     Problem: Pain - Standard  Goal: Alleviation of pain or a reduction in pain to the patient’s comfort goal  Description: Target End Date:  Prior to discharge or change in level of care    Document on Vitals flowsheet    1.  Document pain using the appropriate pain scale per order or unit policy  2.  Educate and implement non-pharmacologic comfort measures (i.e. relaxation, distraction, massage, cold/heat therapy, etc.)  3.  Pain management medications as ordered  4.  Reassess pain after pain med administration per policy  5.  If opiods administered assess patient's response to pain medication is appropriate per POSS sedation scale  6.  Follow pain management plan developed in collaboration with patient and interdisciplinary team (including palliative care or pain specialists if applicable)  Outcome: Progressing     Problem: Infection  Goal: Will remain free from infection  Outcome: Progressing     Problem: Safety  Goal: Will remain free from injury  Outcome:  Progressing  Goal: Will remain free from falls  Outcome: Progressing

## 2024-11-19 NOTE — RESPIRATORY CARE
COPD EDUCATION by COPD CLINICAL EDUCATOR  11/19/2024 at 2:20 PM by Daniella Barbosa RRT     Patient interviewed by COPD education team. Patient refused COPD program at this time, states he has not been told he has COPD, does not use COPD medications, and denies issues with his breathing.      Smoking Cessation Intervention and education completed, 5 minutes spent on smoking cessation education with patient.  Patient declined smoking cessation literature, we did discuss having his physician place prescription for smoking cessation aids to have them covered by insurance.         COPD Screen  COPD Risk Screening  Do you have a history of COPD?: No  COPD Population Screener  During the past 4 weeks, how much did you feel short of breath?: None/Little of the time  Do you ever cough up any mucus or phlegm?: Yes, every day  In the past 12 months, you do less than you used to because of your breathing problems: Disagree/unsure  Have you smoked at least 100 cigarettes in your entire life?: Yes  How old are you?: 60+  COPD Screening Score: 6    COPD Assessment  COPD Clinical Specialists ONLY  COPD Education Initiated: Yes--Short Intervention (Denies COPD Dx Hx, no resp meds, not interested in Smoking Cessation literature.)  Is this a COPD exacerbation patient?: No  DME Company: none prior  DME Equipment Type: none prior  Physician Follow Up Appointment:  (declined apt assistance)  Physician Name: Guillaume Hansen P.A.-C.  Pulmonologist Name: declined pulmonary referral assistance  Referrals Initiated: Yes  Pulmonary Rehab: N/A  Smoking Cessation: Yes  $ Smoking Cessation 3-10 Minutes: Asymptomatic (5 min, declined literature)  Hospice: N/A  Home Health Care: N/A  Mobile Urgent Care Services: N/A  Geriatric Specialty Group: N/A  Private In-Home Care Agency: N/A  $ Demo/Eval of SVN's, MDI's and Aerosols:  (no resp meds)  (OP) Pulmonary Function Testing:  (none on file)  Interdisciplinary Rounds: Attendance at Rounds (30 Min)    PFT  "Results    No results found for: \"PFT\"    Meds to Beds  [facility] provides bedside medication delivery for all eligible patients at discharge and you have been automatically enrolled in the Meds to Beds Program. Would you like to opt out of this program for any reason?: No - Stay Opted In    "

## 2024-11-20 ENCOUNTER — APPOINTMENT (OUTPATIENT)
Dept: RADIOLOGY | Facility: MEDICAL CENTER | Age: 69
End: 2024-11-20
Attending: SURGERY
Payer: MEDICARE

## 2024-11-20 LAB
ANION GAP SERPL CALC-SCNC: 11 MMOL/L (ref 7–16)
BUN SERPL-MCNC: 10 MG/DL (ref 8–22)
CALCIUM SERPL-MCNC: 7.9 MG/DL (ref 8.5–10.5)
CHLORIDE SERPL-SCNC: 99 MMOL/L (ref 96–112)
CO2 SERPL-SCNC: 21 MMOL/L (ref 20–33)
CREAT SERPL-MCNC: 0.6 MG/DL (ref 0.5–1.4)
EKG IMPRESSION: NORMAL
ERYTHROCYTE [DISTWIDTH] IN BLOOD BY AUTOMATED COUNT: 51.5 FL (ref 35.9–50)
GFR SERPLBLD CREATININE-BSD FMLA CKD-EPI: 104 ML/MIN/1.73 M 2
GLUCOSE SERPL-MCNC: 110 MG/DL (ref 65–99)
HCT VFR BLD AUTO: 36.2 % (ref 42–52)
HGB BLD-MCNC: 12.7 G/DL (ref 14–18)
MCH RBC QN AUTO: 32.3 PG (ref 27–33)
MCHC RBC AUTO-ENTMCNC: 35.1 G/DL (ref 32.3–36.5)
MCV RBC AUTO: 92.1 FL (ref 81.4–97.8)
PLATELET # BLD AUTO: 244 K/UL (ref 164–446)
PMV BLD AUTO: 9 FL (ref 9–12.9)
POTASSIUM SERPL-SCNC: 3.5 MMOL/L (ref 3.6–5.5)
RBC # BLD AUTO: 3.93 M/UL (ref 4.7–6.1)
SODIUM SERPL-SCNC: 131 MMOL/L (ref 135–145)
WBC # BLD AUTO: 9.6 K/UL (ref 4.8–10.8)

## 2024-11-20 PROCEDURE — 700105 HCHG RX REV CODE 258

## 2024-11-20 PROCEDURE — 160038 HCHG SURGERY MINUTES - EA ADDL 1 MIN LEVEL 2: Performed by: SURGERY

## 2024-11-20 PROCEDURE — 700102 HCHG RX REV CODE 250 W/ 637 OVERRIDE(OP)

## 2024-11-20 PROCEDURE — 93010 ELECTROCARDIOGRAM REPORT: CPT | Performed by: INTERNAL MEDICINE

## 2024-11-20 PROCEDURE — 99233 SBSQ HOSP IP/OBS HIGH 50: CPT | Performed by: INTERNAL MEDICINE

## 2024-11-20 PROCEDURE — 160036 HCHG PACU - EA ADDL 30 MINS PHASE I: Performed by: SURGERY

## 2024-11-20 PROCEDURE — 85027 COMPLETE CBC AUTOMATED: CPT

## 2024-11-20 PROCEDURE — 99153 MOD SED SAME PHYS/QHP EA: CPT | Performed by: SURGERY

## 2024-11-20 PROCEDURE — 99152 MOD SED SAME PHYS/QHP 5/>YRS: CPT | Performed by: SURGERY

## 2024-11-20 PROCEDURE — 80048 BASIC METABOLIC PNL TOTAL CA: CPT

## 2024-11-20 PROCEDURE — 700105 HCHG RX REV CODE 258: Performed by: SURGERY

## 2024-11-20 PROCEDURE — 160048 HCHG OR STATISTICAL LEVEL 1-5: Performed by: SURGERY

## 2024-11-20 PROCEDURE — 99221 1ST HOSP IP/OBS SF/LOW 40: CPT | Performed by: STUDENT IN AN ORGANIZED HEALTH CARE EDUCATION/TRAINING PROGRAM

## 2024-11-20 PROCEDURE — 36415 COLL VENOUS BLD VENIPUNCTURE: CPT

## 2024-11-20 PROCEDURE — C1769 GUIDE WIRE: HCPCS | Performed by: SURGERY

## 2024-11-20 PROCEDURE — 93005 ELECTROCARDIOGRAM TRACING: CPT | Performed by: SURGERY

## 2024-11-20 PROCEDURE — 160035 HCHG PACU - 1ST 60 MINS PHASE I: Performed by: SURGERY

## 2024-11-20 PROCEDURE — C1760 CLOSURE DEV, VASC: HCPCS | Performed by: SURGERY

## 2024-11-20 PROCEDURE — C1887 CATHETER, GUIDING: HCPCS | Performed by: SURGERY

## 2024-11-20 PROCEDURE — 75625 CONTRAST EXAM ABDOMINL AORTA: CPT | Mod: 26 | Performed by: SURGERY

## 2024-11-20 PROCEDURE — 160002 HCHG RECOVERY MINUTES (STAT): Performed by: SURGERY

## 2024-11-20 PROCEDURE — 75716 ARTERY X-RAYS ARMS/LEGS: CPT | Mod: 26,59 | Performed by: SURGERY

## 2024-11-20 PROCEDURE — B4101ZZ FLUOROSCOPY OF ABDOMINAL AORTA USING LOW OSMOLAR CONTRAST: ICD-10-PCS | Performed by: SURGERY

## 2024-11-20 PROCEDURE — 700111 HCHG RX REV CODE 636 W/ 250 OVERRIDE (IP): Mod: JZ

## 2024-11-20 PROCEDURE — 700111 HCHG RX REV CODE 636 W/ 250 OVERRIDE (IP): Mod: JZ | Performed by: SURGERY

## 2024-11-20 PROCEDURE — C1894 INTRO/SHEATH, NON-LASER: HCPCS | Performed by: SURGERY

## 2024-11-20 PROCEDURE — B41G1ZZ FLUOROSCOPY OF LEFT LOWER EXTREMITY ARTERIES USING LOW OSMOLAR CONTRAST: ICD-10-PCS | Performed by: SURGERY

## 2024-11-20 PROCEDURE — B41F1ZZ FLUOROSCOPY OF RIGHT LOWER EXTREMITY ARTERIES USING LOW OSMOLAR CONTRAST: ICD-10-PCS | Performed by: SURGERY

## 2024-11-20 PROCEDURE — 700117 HCHG RX CONTRAST REV CODE 255: Performed by: SURGERY

## 2024-11-20 PROCEDURE — A9270 NON-COVERED ITEM OR SERVICE: HCPCS

## 2024-11-20 PROCEDURE — 770001 HCHG ROOM/CARE - MED/SURG/GYN PRIV*

## 2024-11-20 PROCEDURE — 160027 HCHG SURGERY MINUTES - 1ST 30 MINS LEVEL 2: Performed by: SURGERY

## 2024-11-20 DEVICE — DEVICE CLOSER STARCLOSE SE - (10/BX): Type: IMPLANTABLE DEVICE | Site: GROIN | Status: FUNCTIONAL

## 2024-11-20 RX ORDER — SODIUM CHLORIDE 9 MG/ML
500 INJECTION, SOLUTION INTRAVENOUS
Status: DISCONTINUED | OUTPATIENT
Start: 2024-11-20 | End: 2024-11-20 | Stop reason: HOSPADM

## 2024-11-20 RX ORDER — IODIXANOL 270 MG/ML
INJECTION, SOLUTION INTRAVASCULAR
Status: DISCONTINUED | OUTPATIENT
Start: 2024-11-20 | End: 2024-11-20 | Stop reason: HOSPADM

## 2024-11-20 RX ORDER — MIDAZOLAM HYDROCHLORIDE 1 MG/ML
.5-2 INJECTION INTRAMUSCULAR; INTRAVENOUS PRN
Status: DISCONTINUED | OUTPATIENT
Start: 2024-11-20 | End: 2024-11-20 | Stop reason: HOSPADM

## 2024-11-20 RX ORDER — ONDANSETRON 2 MG/ML
4 INJECTION INTRAMUSCULAR; INTRAVENOUS PRN
Status: DISCONTINUED | OUTPATIENT
Start: 2024-11-20 | End: 2024-11-20 | Stop reason: HOSPADM

## 2024-11-20 RX ADMIN — AMPICILLIN AND SULBACTAM 3 G: 1; 2 INJECTION, POWDER, FOR SOLUTION INTRAMUSCULAR; INTRAVENOUS at 04:40

## 2024-11-20 RX ADMIN — AMPICILLIN AND SULBACTAM 3 G: 1; 2 INJECTION, POWDER, FOR SOLUTION INTRAMUSCULAR; INTRAVENOUS at 18:07

## 2024-11-20 RX ADMIN — SENNOSIDES AND DOCUSATE SODIUM 2 TABLET: 50; 8.6 TABLET ORAL at 17:55

## 2024-11-20 RX ADMIN — FENTANYL CITRATE 25 MCG: 50 INJECTION, SOLUTION INTRAMUSCULAR; INTRAVENOUS at 15:51

## 2024-11-20 RX ADMIN — LISINOPRIL 30 MG: 10 TABLET ORAL at 04:43

## 2024-11-20 RX ADMIN — AMPICILLIN AND SULBACTAM 3 G: 1; 2 INJECTION, POWDER, FOR SOLUTION INTRAMUSCULAR; INTRAVENOUS at 23:33

## 2024-11-20 RX ADMIN — METOPROLOL TARTRATE 12.5 MG: 25 TABLET, FILM COATED ORAL at 17:57

## 2024-11-20 RX ADMIN — AMPICILLIN AND SULBACTAM 3 G: 1; 2 INJECTION, POWDER, FOR SOLUTION INTRAMUSCULAR; INTRAVENOUS at 12:46

## 2024-11-20 RX ADMIN — LINEZOLID 600 MG: 600 TABLET, FILM COATED ORAL at 04:41

## 2024-11-20 RX ADMIN — FENTANYL CITRATE 50 MCG: 50 INJECTION, SOLUTION INTRAMUSCULAR; INTRAVENOUS at 15:33

## 2024-11-20 RX ADMIN — MIDAZOLAM HYDROCHLORIDE 1 MG: 1 INJECTION, SOLUTION INTRAMUSCULAR; INTRAVENOUS at 15:33

## 2024-11-20 RX ADMIN — METOPROLOL TARTRATE 12.5 MG: 25 TABLET, FILM COATED ORAL at 04:43

## 2024-11-20 RX ADMIN — NICOTINE TRANSDERMAL SYSTEM 21 MG: 21 PATCH, EXTENDED RELEASE TRANSDERMAL at 04:41

## 2024-11-20 RX ADMIN — OXYCODONE 5 MG: 5 TABLET ORAL at 09:56

## 2024-11-20 RX ADMIN — AMPICILLIN AND SULBACTAM 3 G: 1; 2 INJECTION, POWDER, FOR SOLUTION INTRAMUSCULAR; INTRAVENOUS at 00:01

## 2024-11-20 RX ADMIN — LINEZOLID 600 MG: 600 TABLET, FILM COATED ORAL at 17:55

## 2024-11-20 RX ADMIN — ATORVASTATIN CALCIUM 80 MG: 40 TABLET, FILM COATED ORAL at 17:55

## 2024-11-20 RX ADMIN — POLYETHYLENE GLYCOL 3350 1 PACKET: 17 POWDER, FOR SOLUTION ORAL at 21:46

## 2024-11-20 ASSESSMENT — PAIN DESCRIPTION - PAIN TYPE
TYPE: ACUTE PAIN

## 2024-11-20 NOTE — CARE PLAN
The patient is Stable - Low risk of patient condition declining or worsening    Shift Goals  Clinical Goals: NPO Surgery, IV ABX, Wound Care  Patient Goals: Rest  Family Goals: not present    Progress made toward(s) clinical / shift goals:      Problem: Knowledge Deficit - Standard  Goal: Patient and family/care givers will demonstrate understanding of plan of care, disease process/condition, diagnostic tests and medications  Outcome: Progressing  Education provided on plan of care this shift. Questions answered. Patient verbalizes understanding.      Problem: Pain - Standard  Goal: Alleviation of pain or a reduction in pain to the patient’s comfort goal  Outcome: Progressing  Patient educated on pain scale. Encouraged patient to verbalize pain. Patient stated feeling no complaints of severe pain during shift. Rated pain as 2/10 during morning assessment.      Problem: Wound/ / Incision Healing  Goal: Patient's wound/surgical incision will decrease in size and heals properly  Outcome: Progressing  Wound care provided per orders.

## 2024-11-20 NOTE — PROGRESS NOTES
Hospital Medicine Daily Progress Note    Date of Service  11/20/2024    Chief Complaint  Arnulfo Crocker is a 68 y.o. male admitted 11/18/2024 with right foot pain    Hospital Course  68-year-old male with past medical history of CVA, hypertension, and tobacco use who presents due to progressive wound of his right foot complicated by gangrene over the lateral aspect of the foot with necrosis of fourth and fifth digits. CT of the lower extremity with multiple areas of arterial stenosis. Vascular surgery was consulted. X-ray of the right foot demonstrating osteomyelitis.    Interval Problem Update  11/19 Patient noted discoloration of his right foot about 4 weeks ago. He has PAD and continues to smoke. CT reveals significant by bilateral PAD. Vascular surgery consulted.  I discussed the case with vascular surgery, plan for angiogram.  I consulted orthopedics who will evaluate the patient.  Continue IV abx for osteomyelitis.     11/20 Patient is planned for angiogram today for possible vascular intervention. Continue IV abx. Will consult ortho once vascular issues have been addressed.  K is low, added K replacement     I have discussed this patient's plan of care and discharge plan at IDT rounds today with Case Management, Nursing, Nursing leadership, and other members of the IDT team.    Consultants/Specialty  orthopedics and vascular surgery    Code Status  DNAR/DNI    Disposition  The patient is not medically cleared for discharge to home or a post-acute facility.      I have placed the appropriate orders for post-discharge needs.    Review of Systems  Review of Systems   Skin:         Right foot with necrotic fourth and fifth toes        Physical Exam  Temp:  [36.6 °C (97.9 °F)-36.7 °C (98.1 °F)] 36.6 °C (97.9 °F)  Pulse:  [53-63] 57  Resp:  [15-16] 15  BP: (121-143)/(62-80) 121/72  SpO2:  [92 %-95 %] 92 %    Physical Exam  Vitals and nursing note reviewed.   Constitutional:       General: He is not in acute  distress.  HENT:      Head: Normocephalic.      Mouth/Throat:      Mouth: Mucous membranes are moist.   Eyes:      Pupils: Pupils are equal, round, and reactive to light.   Cardiovascular:      Rate and Rhythm: Normal rate and regular rhythm.      Pulses: Normal pulses.      Heart sounds: Normal heart sounds.   Pulmonary:      Effort: Pulmonary effort is normal.      Breath sounds: Normal breath sounds.   Abdominal:      Palpations: Abdomen is soft.      Tenderness: There is no abdominal tenderness.   Musculoskeletal:         General: No swelling.      Cervical back: Neck supple.      Comments: Right foot redness and necrotic  appearing 4th and 5th digits    Skin:     General: Skin is warm.      Coloration: Skin is not jaundiced.   Neurological:      General: No focal deficit present.      Mental Status: He is alert and oriented to person, place, and time.   Psychiatric:         Mood and Affect: Mood normal.         Behavior: Behavior normal.         Fluids    Intake/Output Summary (Last 24 hours) at 11/20/2024 1333  Last data filed at 11/20/2024 0953  Gross per 24 hour   Intake --   Output 550 ml   Net -550 ml        Laboratory  Recent Labs     11/18/24 1958 11/19/24  0830 11/20/24  0054   WBC 12.5* 10.9* 9.6   RBC 4.76 4.12* 3.93*   HEMOGLOBIN 15.4 13.4* 12.7*   HEMATOCRIT 44.8 38.2* 36.2*   MCV 94.1 92.7 92.1   MCH 32.4 32.5 32.3   MCHC 34.4 35.1 35.1   RDW 53.1* 51.5* 51.5*   PLATELETCT 334 274 244   MPV 8.9* 8.8* 9.0     Recent Labs     11/18/24 1958 11/19/24  0830 11/20/24  0054   SODIUM 135 134* 131*   POTASSIUM 4.1 3.9 3.5*   CHLORIDE 99 101 99   CO2 22 21 21   GLUCOSE 140* 109* 110*   BUN 16 11 10   CREATININE 0.86 0.72 0.60   CALCIUM 9.0 8.7 7.9*                   Imaging  CT-CTA LOWER EXT WITH & W/O-POST PROCESS RIGHT   Final Result         1.  Extensive bilateral atherosclerotic plaque.   2.  Occlusion of the right superficial femoral artery which reconstitutes distally. Greater than 70% stenosis of  the distal right superficial femoral artery.   3.  Greater than 70% stenosis of the distal right external iliac artery,   4.  50-70% stenosis of the right tibioperoneal trunk with three-vessel runoff at the right ankle.   5.  Greater than 70% stenosis of the left distal external iliac artery.   6.  50-70% stenosis of the distal left superficial femoral artery   7.  50-70% stenosis of the left popliteal artery.   8.  Greater than 70% stenosis of the left tibioperoneal trunk with occlusion of the proximal posterior tibial artery. Small caliber left posterior tibial artery is seen distally otherwise there appears to be three-vessel runoff of the left ankle.   9.  Soft tissue wound of the right fourth and fifth toes with air in the fifth toe bony structures, compatible with osteomyelitis   10.  Soft tissue edema of the right leg below the knee.      These findings were discussed with the patient's clinician, Luiz Richardson, on 11/18/2024 10:07 PM.      DX-CHEST-PORTABLE (1 VIEW)   Final Result         1.  Right basilar atelectasis and/or subtle infiltrates.   2.  Trace right pleural effusion   3.  Right rib fractures are seen, appear likely subacute      IR-ABDOMINAL AORTA-WITH RUNOFF    (Results Pending)        Assessment/Plan  * Osteomyelitis of Right 4th and 5th toe(HCC)- (present on admission)  Assessment & Plan  At [facility], x-ray presented osteomyelitis as well as some soft tissue gas on the right foot.  Ultrasound presented significant peripheral artery disease.  Received 1 dose of ceftriaxone and vancomycin.  Transferred to [facility] for vascular consult.  At [facility], CTA lower extremity presented extensive bilateral arthrosclerotic plaque and occlusion of right superficial femoral artery, greater then 70% stenosis of the distal right external iliac artery and multiple stenosis.  ED physician contacted vascular surgeon who is planning to evaluate tomorrow 11/19/2024.  Received 1 dose of  metronidazole  -Admitted Med/Surg floor  -Unasyn and linezolid  -Follow-up blood and wound culture collected on 11/19-20/2024   Vascular surgery consulted, plan for angiogram today  Will consult ortho after vascular issues have been addressed     Hyperlipidemia  Assessment & Plan  -Continue home atorvastatin    SIRS (systemic inflammatory response syndrome) (HCC)  Assessment & Plan  SIRS criteria identified on my evaluation include:  Tachycardia, with heart rate greater than 90 BPM and Leukocytosis, with WBC greater than 12,000  SIRS is non-infectious, the patient does not have sepsis  Elevated C-reactive, likely reactive leukocytosis secondary to osteomyelitis    Left-sided cerebrovascular accident (CVA) (HCC)- (present on admission)  Assessment & Plan  Left frontal lobe/insular infarct 7/2/2022  -Hold home aspirin due to possible amputation scheduled on 1119/24    Tobacco use disorder  Assessment & Plan  - Nicotine patch placed    Essential hypertension- (present on admission)  Assessment & Plan  -Continue home lisinopril and metoprolol tartrate         VTE prophylaxis: scd    I have performed a physical exam and reviewed and updated ROS and Plan today (11/20/2024). In review of yesterday's note (11/19/2024), there are no changes except as documented above.    I spent 52 minutes, reviewing the chart, obtaining and/or reviewing separately obtained history. Performing a medically appropriate examination and evaluation.  Counseling and educating the patient. Ordering and reviewing medications, tests, or procedures.  Discussing the case with Ortho and Vascular surgery.  Documenting clinical information in EPIC. Independently interpreting results and communicating results to patient. Discussing future disposition of care with patient, RN and case management.

## 2024-11-20 NOTE — PROGRESS NOTES
68M hx PVD with right foot/toe necrosis  Clinical images reviewed   Likely requiring at least TMA  Plan for angio possible bypass  Please contact ortho on call physician when this is complete        Joe Moreau MD  Orthopedic Trauma Surgery

## 2024-11-20 NOTE — OR NURSING
Attempted to transfer my call but unsuccessful  Staff Kayla notified that patient transport has been requested for the above patient .

## 2024-11-20 NOTE — CARE PLAN
The patient is Stable - Low risk of patient condition declining or worsening    Shift Goals  Clinical Goals: surgical plan of care, npo, abx  Patient Goals: surgical plan of care  Family Goals: not present    Progress made toward(s) clinical / shift goals:  yes      Problem: Knowledge Deficit - Standard  Goal: Patient and family/care givers will demonstrate understanding of plan of care, disease process/condition, diagnostic tests and medications  Outcome: Progressing     Problem: Pain - Standard  Goal: Alleviation of pain or a reduction in pain to the patient’s comfort goal  Outcome: Progressing     Problem: Infection  Goal: Will remain free from infection  Outcome: Progressing     Problem: Safety  Goal: Will remain free from injury  Outcome: Progressing  Goal: Will remain free from falls  Outcome: Progressing     Problem: Hemodynamics  Goal: Patient's hemodynamics, fluid balance and neurologic status will be stable or improve  Outcome: Progressing     Problem: Wound/ / Incision Healing  Goal: Patient's wound/surgical incision will decrease in size and heals properly  Outcome: Progressing

## 2024-11-20 NOTE — WOUND TEAM
[facility]  Inpatient Services  Initial Wound and Skin Care Evaluation    Admission Date: 11/18/2024     Last order of IP CONSULT TO WOUND CARE was found on 11/19/2024 from Hospital Encounter on 11/18/2024     HPI, PMH, SH: Reviewed    Past Surgical History:   Procedure Laterality Date    ORIF, HAND  2013    Right hand      Social History     Tobacco Use    Smoking status: Every Day     Current packs/day: 0.50     Average packs/day: 0.3 packs/day for 56.9 years (14.9 ttl pk-yrs)     Types: Cigarettes     Start date: 1968    Smokeless tobacco: Never   Substance Use Topics    Alcohol use: Yes     Alcohol/week: 12.6 oz     Types: 21 Cans of beer per week     Comment: 3 beers per day     Chief Complaint   Patient presents with    Sent by MD     Pt sent by [facility] for R foot necrosis , per outside facility pt has osteomyelitis . Pt presents with 3rd and 4th toes on R foot that appear black and necrotic. Pt denies any pain.       Diagnosis: Osteomyelitis (HCC) [M86.9]    Unit where seen by Wound Team: T302/01     WOUND CONSULT RELATED TO:  Right 4th and 5th toes, Buttocks    WOUND TEAM PLAN OF CARE - Frequency of Follow-up:   Nursing to follow dressing orders written for wound care. Contact wound team if area fails to progress, deteriorates or with any questions/concerns if something comes up before next scheduled follow up (See below as to whether wound is following and frequency of wound follow up)   Not following, consult as needed  - Right 4th and 5th toes, buttocks    WOUND HISTORY:     Per H&P: 68 y.o. male with multiple medical problem as well as alcohol and tobacco use who was transferred from outside hospital to [facility] for gangrenous right fourth and fifth toes.  CTA showed multilevel arterial occlusive disease.  Patient was admitted.  Vascular service is consulted.          WOUND ASSESSMENT/LDA  Wound 11/18/24 Toe, 4th;Toe, 5th Right Necrotic (Active)   Date First  Assessed/Time First Assessed: 11/18/24 1356   Location: Toe, 4th;Toe, 5th  Laterality: Right  Wound Description (Comments): Necrotic      Assessments 11/19/2024  4:00 PM   Wound Image       Site Assessment Black;Yellow   Periwound Assessment Pink;Blistered   Margins Unattached edges;Undefined edges   Closure Open to air   Drainage Amount None   Treatments Cleansed;Nonselective debridement;Site care   Wound Cleansing Approved Wound Cleanser   Periwound Protectant No-sting Skin Prep   Dressing Status Open to Air   NEXT Weekly Photo (Inpatient Only) 11/26/24   Wound Team Following Not following        Vascular:    BRYANT:   BRYANT Results, Last 30 Days US-EXTREMITY ARTERY LOWER UNILAT RIGHT    Result Date: 11/18/2024  Narrative HISTORY/REASON FOR EXAM: Gangrene TECHNIQUE/EXAM DESCRIPTION: Doppler sonographic imaging of the arterial vasculature of the right lower extremity was performed.  Multi level flow velocity measurements were obtained and are displayed below in units of centimeters per second., 11/18/2024 2:24 PM COMPARISON: None. FINDINGS: Site                                    Right              Left External iliac                       157 Common femoral artery      112 Proximal SFA                     occluded Mid SFA                            occluded Distal SFA                          13 Popliteal                             42 Posterior tibial                     25 Dorsalis pedis                     15 Anterior tibial                      41 Peroneal                            20 There is occlusion of the superficial femoral proximally, with reconstitution distally, apparently by means of collateral vasculature. As expected, biphasic waveforms are seen distal to the area of occlusion. There is calcific atherosclerotic plaque noted throughout all the major arterial vasculature of the right lower extremity. Mildly elevated flow velocity of 157 cm/s with external iliac artery indicates a stenosis of 30-49%.      Impression There is evidence of occlusion of the proximal and mid portions of the right superficial femoral artery reconstitution at the distal superficial femoral artery. There is evidence of atherosclerotic disease throughout the major arterial vasculature of the right lower extremity      Lab Values:    Lab Results   Component Value Date/Time    WBC 10.9 (H) 11/19/2024 08:30 AM    RBC 4.12 (L) 11/19/2024 08:30 AM    HEMOGLOBIN 13.4 (L) 11/19/2024 08:30 AM    HEMATOCRIT 38.2 (L) 11/19/2024 08:30 AM    CREACTPROT 3.2 (H) 11/18/2024 02:02 PM    HBA1C 5.9 (H) 07/22/2024 08:06 AM         Culture Results show:  No results found for this or any previous visit (from the past 720 hours).    Pain Level/Medicated:  None, Tolerated without pain medication       INTERVENTIONS BY WOUND TEAM:  Chart and images reviewed. Discussed with bedside RN. All areas of concern (based on picture review, LDA review and discussion with bedside RN) have been thoroughly assessed. Documentation of areas based on significant findings. This RN in to assess patient. Performed standard wound care which includes appropriate positioning, dressing removal and non-selective debridement. Pictures and measurements obtained weekly if/when required.    Wound:  Right 4th and 5th Toes  Preparation for Dressing removal: Open to air  Cleansed/Non-selectively Debrided with:  Normal Saline and Gauze  Elma wound: Cleansed with Normal Saline and Gauze, Prepped with No Sting  Primary Dressing:  Painted with Betadine, left open to air    Sacrum and heels intact.           Advanced Wound Care Discharge Planning  Number of Clinicians necessary to complete wound care: 1  Is patient requiring IV pain medications for dressing changes:  No   Length of time for dressing change 10 min. (This does not include chart review, pre-medication time, set up, clean up or time spent charting.)    Interdisciplinary consultation: Patient,  Eliana MAIN (Wound RN).  Pressure injury and  staging reviewed with N/A.    EVALUATION / RATIONALE FOR TREATMENT:     Date:  11/19/24  Wound Status:  Initial evaluation    Wound beds of toes gangrenous, with foul odor and necrosis. Right lateral side of foot with an ulceration and blistering. Painted with Betadine, weaved gauze in between toes, and left open to air per Dr. Flores from Vascular Surgery. Patient will undergo a revascularization procedure before ortho amputation.          Goals: Steady decrease in wound area and depth weekly.    NURSING PLAN OF CARE ORDERS:  Dressing changes: See Dressing Care orders  RN Prevention Protocol    NUTRITION RECOMMENDATIONS   Wound Team Recommendations:  N/A    DIET ORDERS (From admission to next 24h)       Start     Ordered    11/19/24 0951  Diet NPO Restrict to: Sips with Medications  AT MIDNIGHT      Question:  Diet NPO Restrict to:  Answer:  Sips with Medications    11/19/24 1029    11/19/24 1029  Diet Order Diet: Cardiac  ALL MEALS        Question:  Diet:  Answer:  Cardiac    11/19/24 1029                    PREVENTATIVE INTERVENTIONS:    Q shift Agustin - performed per nursing policy  Q shift pressure point assessments - performed per nursing policy    Surface/Positioning  Standard/trauma mattress - Currently in Place    Offloading/Redistribution  Sacral offloading dressing (Silicone dressing) - Ordered  Heel float boots (Prevalon boot) - Currently in Place    Anticipated discharge plans:  TBD        Vac Discharge Needs:  Vac Discharge plan is purely a recommendation from wound team and not a requirement for discharge unless otherwise stated by physician.  Not Applicable Pt not on a wound vac

## 2024-11-21 ENCOUNTER — APPOINTMENT (OUTPATIENT)
Dept: RADIOLOGY | Facility: MEDICAL CENTER | Age: 69
End: 2024-11-21
Attending: SURGERY
Payer: MEDICARE

## 2024-11-21 ENCOUNTER — ANESTHESIA (OUTPATIENT)
Dept: SURGERY | Facility: MEDICAL CENTER | Age: 69
End: 2024-11-21
Payer: MEDICARE

## 2024-11-21 ENCOUNTER — ANESTHESIA EVENT (OUTPATIENT)
Dept: SURGERY | Facility: MEDICAL CENTER | Age: 69
End: 2024-11-21
Payer: MEDICARE

## 2024-11-21 LAB
ANION GAP SERPL CALC-SCNC: 11 MMOL/L (ref 7–16)
BACTERIA UR CULT: NORMAL
BUN SERPL-MCNC: 11 MG/DL (ref 8–22)
CALCIUM SERPL-MCNC: 8 MG/DL (ref 8.5–10.5)
CHLORIDE SERPL-SCNC: 102 MMOL/L (ref 96–112)
CO2 SERPL-SCNC: 21 MMOL/L (ref 20–33)
CREAT SERPL-MCNC: 0.69 MG/DL (ref 0.5–1.4)
EKG IMPRESSION: NORMAL
ERYTHROCYTE [DISTWIDTH] IN BLOOD BY AUTOMATED COUNT: 51.7 FL (ref 35.9–50)
GFR SERPLBLD CREATININE-BSD FMLA CKD-EPI: 100 ML/MIN/1.73 M 2
GLUCOSE SERPL-MCNC: 98 MG/DL (ref 65–99)
HCT VFR BLD AUTO: 37.8 % (ref 42–52)
HGB BLD-MCNC: 13.3 G/DL (ref 14–18)
MCH RBC QN AUTO: 32.8 PG (ref 27–33)
MCHC RBC AUTO-ENTMCNC: 35.2 G/DL (ref 32.3–36.5)
MCV RBC AUTO: 93.3 FL (ref 81.4–97.8)
PLATELET # BLD AUTO: 238 K/UL (ref 164–446)
PMV BLD AUTO: 9.1 FL (ref 9–12.9)
POTASSIUM SERPL-SCNC: 3.5 MMOL/L (ref 3.6–5.5)
RBC # BLD AUTO: 4.05 M/UL (ref 4.7–6.1)
SIGNIFICANT IND 70042: NORMAL
SITE SITE: NORMAL
SODIUM SERPL-SCNC: 134 MMOL/L (ref 135–145)
SOURCE SOURCE: NORMAL
WBC # BLD AUTO: 9.6 K/UL (ref 4.8–10.8)

## 2024-11-21 PROCEDURE — 700102 HCHG RX REV CODE 250 W/ 637 OVERRIDE(OP)

## 2024-11-21 PROCEDURE — 700105 HCHG RX REV CODE 258: Performed by: ANESTHESIOLOGY

## 2024-11-21 PROCEDURE — 93005 ELECTROCARDIOGRAM TRACING: CPT | Performed by: ANESTHESIOLOGY

## 2024-11-21 PROCEDURE — C1725 CATH, TRANSLUMIN NON-LASER: HCPCS | Performed by: SURGERY

## 2024-11-21 PROCEDURE — 160041 HCHG SURGERY MINUTES - EA ADDL 1 MIN LEVEL 4: Performed by: SURGERY

## 2024-11-21 PROCEDURE — 06BP0ZZ EXCISION OF RIGHT SAPHENOUS VEIN, OPEN APPROACH: ICD-10-PCS | Performed by: SURGERY

## 2024-11-21 PROCEDURE — 160029 HCHG SURGERY MINUTES - 1ST 30 MINS LEVEL 4: Performed by: SURGERY

## 2024-11-21 PROCEDURE — 160035 HCHG PACU - 1ST 60 MINS PHASE I: Performed by: SURGERY

## 2024-11-21 PROCEDURE — 37220 PR REVASCULARIZE ILIAC ARTERY,ANGIOPLASTY, I*: CPT | Mod: RT | Performed by: SURGERY

## 2024-11-21 PROCEDURE — 700101 HCHG RX REV CODE 250: Performed by: ANESTHESIOLOGY

## 2024-11-21 PROCEDURE — 700117 HCHG RX CONTRAST REV CODE 255: Performed by: SURGERY

## 2024-11-21 PROCEDURE — C1713 ANCHOR/SCREW BN/BN,TIS/BN: HCPCS | Performed by: SURGERY

## 2024-11-21 PROCEDURE — 36415 COLL VENOUS BLD VENIPUNCTURE: CPT

## 2024-11-21 PROCEDURE — 041K09L BYPASS RIGHT FEMORAL ARTERY TO POPLITEAL ARTERY WITH AUTOLOGOUS VENOUS TISSUE, OPEN APPROACH: ICD-10-PCS | Performed by: SURGERY

## 2024-11-21 PROCEDURE — 700105 HCHG RX REV CODE 258: Performed by: SURGERY

## 2024-11-21 PROCEDURE — 700105 HCHG RX REV CODE 258

## 2024-11-21 PROCEDURE — 80048 BASIC METABOLIC PNL TOTAL CA: CPT

## 2024-11-21 PROCEDURE — 93010 ELECTROCARDIOGRAM REPORT: CPT | Performed by: INTERNAL MEDICINE

## 2024-11-21 PROCEDURE — 35355 RECHANNELING OF ARTERY: CPT | Mod: RT | Performed by: SURGERY

## 2024-11-21 PROCEDURE — A9270 NON-COVERED ITEM OR SERVICE: HCPCS

## 2024-11-21 PROCEDURE — 160048 HCHG OR STATISTICAL LEVEL 1-5: Performed by: SURGERY

## 2024-11-21 PROCEDURE — 99233 SBSQ HOSP IP/OBS HIGH 50: CPT | Performed by: INTERNAL MEDICINE

## 2024-11-21 PROCEDURE — 700111 HCHG RX REV CODE 636 W/ 250 OVERRIDE (IP): Performed by: SURGERY

## 2024-11-21 PROCEDURE — 700111 HCHG RX REV CODE 636 W/ 250 OVERRIDE (IP): Mod: JZ

## 2024-11-21 PROCEDURE — 770001 HCHG ROOM/CARE - MED/SURG/GYN PRIV*

## 2024-11-21 PROCEDURE — C1729 CATH, DRAINAGE: HCPCS | Performed by: SURGERY

## 2024-11-21 PROCEDURE — 35556 ART BYP GRFT FEM-POPLITEAL: CPT | Mod: RT | Performed by: SURGERY

## 2024-11-21 PROCEDURE — C1751 CATH, INF, PER/CENT/MIDLINE: HCPCS | Performed by: SURGERY

## 2024-11-21 PROCEDURE — 700102 HCHG RX REV CODE 250 W/ 637 OVERRIDE(OP): Performed by: SURGERY

## 2024-11-21 PROCEDURE — 75774 ARTERY X-RAY EACH VESSEL: CPT | Mod: RT

## 2024-11-21 PROCEDURE — 047H3ZZ DILATION OF RIGHT EXTERNAL ILIAC ARTERY, PERCUTANEOUS APPROACH: ICD-10-PCS | Performed by: SURGERY

## 2024-11-21 PROCEDURE — 160009 HCHG ANES TIME/MIN: Performed by: SURGERY

## 2024-11-21 PROCEDURE — 85027 COMPLETE CBC AUTOMATED: CPT

## 2024-11-21 PROCEDURE — A9270 NON-COVERED ITEM OR SERVICE: HCPCS | Performed by: SURGERY

## 2024-11-21 PROCEDURE — 700111 HCHG RX REV CODE 636 W/ 250 OVERRIDE (IP): Performed by: ANESTHESIOLOGY

## 2024-11-21 PROCEDURE — C1894 INTRO/SHEATH, NON-LASER: HCPCS | Performed by: SURGERY

## 2024-11-21 PROCEDURE — 700101 HCHG RX REV CODE 250: Performed by: SURGERY

## 2024-11-21 PROCEDURE — C1769 GUIDE WIRE: HCPCS | Performed by: SURGERY

## 2024-11-21 PROCEDURE — 160002 HCHG RECOVERY MINUTES (STAT): Performed by: SURGERY

## 2024-11-21 PROCEDURE — 04CK0ZZ EXTIRPATION OF MATTER FROM RIGHT FEMORAL ARTERY, OPEN APPROACH: ICD-10-PCS | Performed by: SURGERY

## 2024-11-21 RX ORDER — ONDANSETRON 2 MG/ML
4 INJECTION INTRAMUSCULAR; INTRAVENOUS
Status: DISCONTINUED | OUTPATIENT
Start: 2024-11-21 | End: 2024-11-21 | Stop reason: HOSPADM

## 2024-11-21 RX ORDER — HYDROMORPHONE HYDROCHLORIDE 1 MG/ML
0.1 INJECTION, SOLUTION INTRAMUSCULAR; INTRAVENOUS; SUBCUTANEOUS
Status: DISCONTINUED | OUTPATIENT
Start: 2024-11-21 | End: 2024-11-21 | Stop reason: HOSPADM

## 2024-11-21 RX ORDER — SODIUM CHLORIDE 9 MG/ML
INJECTION, SOLUTION INTRAVENOUS CONTINUOUS
Status: DISCONTINUED | OUTPATIENT
Start: 2024-11-21 | End: 2024-11-21 | Stop reason: HOSPADM

## 2024-11-21 RX ORDER — LABETALOL HYDROCHLORIDE 5 MG/ML
10 INJECTION, SOLUTION INTRAVENOUS EVERY 4 HOURS PRN
Status: DISCONTINUED | OUTPATIENT
Start: 2024-11-21 | End: 2024-11-27 | Stop reason: HOSPADM

## 2024-11-21 RX ORDER — SODIUM CHLORIDE 9 MG/ML
INJECTION, SOLUTION INTRAVENOUS
Status: DISCONTINUED | OUTPATIENT
Start: 2024-11-21 | End: 2024-11-21 | Stop reason: SURG

## 2024-11-21 RX ORDER — METOPROLOL TARTRATE 1 MG/ML
1 INJECTION, SOLUTION INTRAVENOUS
Status: DISCONTINUED | OUTPATIENT
Start: 2024-11-21 | End: 2024-11-21 | Stop reason: HOSPADM

## 2024-11-21 RX ORDER — HYDROMORPHONE HYDROCHLORIDE 1 MG/ML
0.4 INJECTION, SOLUTION INTRAMUSCULAR; INTRAVENOUS; SUBCUTANEOUS
Status: DISCONTINUED | OUTPATIENT
Start: 2024-11-21 | End: 2024-11-21 | Stop reason: HOSPADM

## 2024-11-21 RX ORDER — HYDROMORPHONE HYDROCHLORIDE 1 MG/ML
0.2 INJECTION, SOLUTION INTRAMUSCULAR; INTRAVENOUS; SUBCUTANEOUS
Status: DISCONTINUED | OUTPATIENT
Start: 2024-11-21 | End: 2024-11-21 | Stop reason: HOSPADM

## 2024-11-21 RX ORDER — ONDANSETRON 2 MG/ML
INJECTION INTRAMUSCULAR; INTRAVENOUS PRN
Status: DISCONTINUED | OUTPATIENT
Start: 2024-11-21 | End: 2024-11-21 | Stop reason: SURG

## 2024-11-21 RX ORDER — HYDRALAZINE HYDROCHLORIDE 20 MG/ML
5 INJECTION INTRAMUSCULAR; INTRAVENOUS
Status: DISCONTINUED | OUTPATIENT
Start: 2024-11-21 | End: 2024-11-21 | Stop reason: HOSPADM

## 2024-11-21 RX ORDER — HEPARIN SODIUM 1000 [USP'U]/ML
INJECTION, SOLUTION INTRAVENOUS; SUBCUTANEOUS PRN
Status: DISCONTINUED | OUTPATIENT
Start: 2024-11-21 | End: 2024-11-21 | Stop reason: SURG

## 2024-11-21 RX ORDER — LIDOCAINE HYDROCHLORIDE 40 MG/ML
SOLUTION TOPICAL PRN
Status: DISCONTINUED | OUTPATIENT
Start: 2024-11-21 | End: 2024-11-21 | Stop reason: SURG

## 2024-11-21 RX ORDER — HALOPERIDOL 5 MG/ML
1 INJECTION INTRAMUSCULAR
Status: DISCONTINUED | OUTPATIENT
Start: 2024-11-21 | End: 2024-11-21 | Stop reason: HOSPADM

## 2024-11-21 RX ORDER — ROCURONIUM BROMIDE 10 MG/ML
INJECTION, SOLUTION INTRAVENOUS PRN
Status: DISCONTINUED | OUTPATIENT
Start: 2024-11-21 | End: 2024-11-21 | Stop reason: SURG

## 2024-11-21 RX ORDER — OXYCODONE HCL 5 MG/5 ML
10 SOLUTION, ORAL ORAL
Status: DISCONTINUED | OUTPATIENT
Start: 2024-11-21 | End: 2024-11-21 | Stop reason: HOSPADM

## 2024-11-21 RX ORDER — HYDRALAZINE HYDROCHLORIDE 20 MG/ML
10 INJECTION INTRAMUSCULAR; INTRAVENOUS EVERY 4 HOURS PRN
Status: DISCONTINUED | OUTPATIENT
Start: 2024-11-21 | End: 2024-11-27 | Stop reason: HOSPADM

## 2024-11-21 RX ORDER — VASOPRESSIN 20 U/ML
INJECTION PARENTERAL PRN
Status: DISCONTINUED | OUTPATIENT
Start: 2024-11-21 | End: 2024-11-21 | Stop reason: SURG

## 2024-11-21 RX ORDER — GLYCOPYRROLATE 0.2 MG/ML
INJECTION INTRAMUSCULAR; INTRAVENOUS PRN
Status: DISCONTINUED | OUTPATIENT
Start: 2024-11-21 | End: 2024-11-21 | Stop reason: SURG

## 2024-11-21 RX ORDER — HYDROMORPHONE HYDROCHLORIDE 2 MG/ML
INJECTION, SOLUTION INTRAMUSCULAR; INTRAVENOUS; SUBCUTANEOUS PRN
Status: DISCONTINUED | OUTPATIENT
Start: 2024-11-21 | End: 2024-11-21 | Stop reason: SURG

## 2024-11-21 RX ORDER — DIPHENHYDRAMINE HYDROCHLORIDE 50 MG/ML
12.5 INJECTION INTRAMUSCULAR; INTRAVENOUS
Status: DISCONTINUED | OUTPATIENT
Start: 2024-11-21 | End: 2024-11-21 | Stop reason: HOSPADM

## 2024-11-21 RX ORDER — LIDOCAINE HYDROCHLORIDE 20 MG/ML
INJECTION, SOLUTION EPIDURAL; INFILTRATION; INTRACAUDAL; PERINEURAL PRN
Status: DISCONTINUED | OUTPATIENT
Start: 2024-11-21 | End: 2024-11-21 | Stop reason: SURG

## 2024-11-21 RX ORDER — ENOXAPARIN SODIUM 100 MG/ML
40 INJECTION SUBCUTANEOUS DAILY
Status: DISCONTINUED | OUTPATIENT
Start: 2024-11-22 | End: 2024-11-27 | Stop reason: HOSPADM

## 2024-11-21 RX ORDER — IPRATROPIUM BROMIDE AND ALBUTEROL SULFATE 2.5; .5 MG/3ML; MG/3ML
3 SOLUTION RESPIRATORY (INHALATION)
Status: DISCONTINUED | OUTPATIENT
Start: 2024-11-21 | End: 2024-11-21 | Stop reason: HOSPADM

## 2024-11-21 RX ORDER — ASPIRIN 81 MG/1
81 TABLET ORAL DAILY
Status: DISCONTINUED | OUTPATIENT
Start: 2024-11-21 | End: 2024-11-27 | Stop reason: HOSPADM

## 2024-11-21 RX ORDER — CEFAZOLIN SODIUM 1 G/3ML
INJECTION, POWDER, FOR SOLUTION INTRAMUSCULAR; INTRAVENOUS PRN
Status: DISCONTINUED | OUTPATIENT
Start: 2024-11-21 | End: 2024-11-21 | Stop reason: SURG

## 2024-11-21 RX ORDER — SODIUM CHLORIDE 9 MG/ML
INJECTION, SOLUTION INTRAVENOUS CONTINUOUS
Status: ACTIVE | OUTPATIENT
Start: 2024-11-21 | End: 2024-11-21

## 2024-11-21 RX ORDER — OXYCODONE HCL 5 MG/5 ML
5 SOLUTION, ORAL ORAL
Status: DISCONTINUED | OUTPATIENT
Start: 2024-11-21 | End: 2024-11-21 | Stop reason: HOSPADM

## 2024-11-21 RX ORDER — CLOPIDOGREL BISULFATE 75 MG/1
75 TABLET ORAL DAILY
Status: DISCONTINUED | OUTPATIENT
Start: 2024-11-21 | End: 2024-11-27 | Stop reason: HOSPADM

## 2024-11-21 RX ORDER — PAPAVERINE HYDROCHLORIDE 30 MG/ML
INJECTION INTRAMUSCULAR; INTRAVENOUS
Status: DISCONTINUED | OUTPATIENT
Start: 2024-11-21 | End: 2024-11-21 | Stop reason: HOSPADM

## 2024-11-21 RX ORDER — ONDANSETRON 2 MG/ML
4 INJECTION INTRAMUSCULAR; INTRAVENOUS EVERY 4 HOURS PRN
Status: DISCONTINUED | OUTPATIENT
Start: 2024-11-21 | End: 2024-11-27 | Stop reason: HOSPADM

## 2024-11-21 RX ORDER — DOCUSATE SODIUM 100 MG/1
100 CAPSULE, LIQUID FILLED ORAL 2 TIMES DAILY
Status: DISCONTINUED | OUTPATIENT
Start: 2024-11-21 | End: 2024-11-27 | Stop reason: HOSPADM

## 2024-11-21 RX ADMIN — SODIUM CHLORIDE: 9 INJECTION, SOLUTION INTRAVENOUS at 10:16

## 2024-11-21 RX ADMIN — ATORVASTATIN CALCIUM 80 MG: 40 TABLET, FILM COATED ORAL at 16:46

## 2024-11-21 RX ADMIN — VASOPRESSIN 0.5 UNITS: 20 INJECTION INTRAVENOUS at 13:53

## 2024-11-21 RX ADMIN — ROCURONIUM BROMIDE 50 MG: 50 INJECTION, SOLUTION INTRAVENOUS at 10:24

## 2024-11-21 RX ADMIN — SENNOSIDES AND DOCUSATE SODIUM 2 TABLET: 50; 8.6 TABLET ORAL at 16:46

## 2024-11-21 RX ADMIN — DOCUSATE SODIUM 100 MG: 100 CAPSULE, LIQUID FILLED ORAL at 17:25

## 2024-11-21 RX ADMIN — AMPICILLIN AND SULBACTAM 3 G: 1; 2 INJECTION, POWDER, FOR SOLUTION INTRAMUSCULAR; INTRAVENOUS at 04:09

## 2024-11-21 RX ADMIN — LINEZOLID 600 MG: 600 TABLET, FILM COATED ORAL at 04:06

## 2024-11-21 RX ADMIN — PROPOFOL 50 MG: 10 INJECTION, EMULSION INTRAVENOUS at 10:55

## 2024-11-21 RX ADMIN — LISINOPRIL 30 MG: 10 TABLET ORAL at 04:06

## 2024-11-21 RX ADMIN — METOPROLOL TARTRATE 12.5 MG: 25 TABLET, FILM COATED ORAL at 16:46

## 2024-11-21 RX ADMIN — ASPIRIN 81 MG: 81 TABLET, COATED ORAL at 14:50

## 2024-11-21 RX ADMIN — LIDOCAINE HYDROCHLORIDE 4 ML: 40 SOLUTION TOPICAL at 10:25

## 2024-11-21 RX ADMIN — NICOTINE TRANSDERMAL SYSTEM 21 MG: 21 PATCH, EXTENDED RELEASE TRANSDERMAL at 04:06

## 2024-11-21 RX ADMIN — LIDOCAINE HYDROCHLORIDE 100 MG: 20 INJECTION, SOLUTION EPIDURAL; INFILTRATION; INTRACAUDAL; PERINEURAL at 10:24

## 2024-11-21 RX ADMIN — HYDROMORPHONE HYDROCHLORIDE 1 MG: 2 INJECTION INTRAMUSCULAR; INTRAVENOUS; SUBCUTANEOUS at 10:55

## 2024-11-21 RX ADMIN — PROPOFOL 150 MG: 10 INJECTION, EMULSION INTRAVENOUS at 10:24

## 2024-11-21 RX ADMIN — VASOPRESSIN 1 UNITS: 20 INJECTION INTRAVENOUS at 12:40

## 2024-11-21 RX ADMIN — NOREPINEPHRINE BITARTRATE 0.1 MCG/KG/MIN: 1 INJECTION, SOLUTION, CONCENTRATE INTRAVENOUS at 10:24

## 2024-11-21 RX ADMIN — METOPROLOL TARTRATE 12.5 MG: 25 TABLET, FILM COATED ORAL at 04:06

## 2024-11-21 RX ADMIN — CEFAZOLIN 2 G: 1 INJECTION, POWDER, FOR SOLUTION INTRAMUSCULAR; INTRAVENOUS at 10:24

## 2024-11-21 RX ADMIN — AMPICILLIN AND SULBACTAM 3 G: 1; 2 INJECTION, POWDER, FOR SOLUTION INTRAMUSCULAR; INTRAVENOUS at 16:53

## 2024-11-21 RX ADMIN — HEPARIN SODIUM 6000 UNITS: 1000 INJECTION, SOLUTION INTRAVENOUS; SUBCUTANEOUS at 11:25

## 2024-11-21 RX ADMIN — CLOPIDOGREL BISULFATE 75 MG: 75 TABLET ORAL at 16:46

## 2024-11-21 RX ADMIN — ONDANSETRON 4 MG: 2 INJECTION INTRAMUSCULAR; INTRAVENOUS at 13:47

## 2024-11-21 RX ADMIN — HYDROMORPHONE HYDROCHLORIDE 1 MG: 2 INJECTION INTRAMUSCULAR; INTRAVENOUS; SUBCUTANEOUS at 10:20

## 2024-11-21 RX ADMIN — GLYCOPYRROLATE 0.3 MG: 0.2 INJECTION INTRAMUSCULAR; INTRAVENOUS at 10:59

## 2024-11-21 RX ADMIN — SUGAMMADEX 200 MG: 100 INJECTION, SOLUTION INTRAVENOUS at 13:47

## 2024-11-21 RX ADMIN — OXYCODONE 5 MG: 5 TABLET ORAL at 16:56

## 2024-11-21 RX ADMIN — VASOPRESSIN 1 UNITS: 20 INJECTION INTRAVENOUS at 11:00

## 2024-11-21 RX ADMIN — LINEZOLID 600 MG: 600 TABLET, FILM COATED ORAL at 16:46

## 2024-11-21 ASSESSMENT — PAIN DESCRIPTION - PAIN TYPE
TYPE: ACUTE PAIN

## 2024-11-21 ASSESSMENT — PAIN SCALES - GENERAL: PAIN_LEVEL: 0

## 2024-11-21 NOTE — ANESTHESIA POSTPROCEDURE EVALUATION
Patient: Arnulfo Crocker    Procedure Summary       Date: 11/21/24 Room / Location: [facility] 06 / SURGERY [facility]    Anesthesia Start: 1016 Anesthesia Stop: 1407    Procedure: RIGHT FEMORAL TO BELOW THE KNEE POPLITEAL BYPASS, RIGHT ILIAC ANGIOGRAM, RIGHT ILIAC ANGIOPLASTY, RIGHT COMMON FEMORAL ENDARECTOMY (Right: Leg Upper) Diagnosis: (multilevel arterial occlusive disease, right toe gangrene)    Surgeons: José Luis Flores M.D. Responsible Provider: Gutierrez Eisenberg M.D.    Anesthesia Type: general ASA Status: 3            Final Anesthesia Type: general  Last vitals  BP   Blood Pressure : 132/70, Arterial BP: 123/47    Temp   36.6 °C (97.9 °F)    Pulse   66   Resp   17    SpO2   98 %      Anesthesia Post Evaluation    Patient location during evaluation: PACU  Patient participation: complete - patient participated  Level of consciousness: sleepy but conscious  Pain score: 0    Airway patency: patent  Anesthetic complications: no  Cardiovascular status: hemodynamically stable  Respiratory status: acceptable  Hydration status: balanced    PONV: none          There were no known notable events for this encounter.     Nurse Pain Score: 0 (NPRS)

## 2024-11-21 NOTE — OR SURGEON
Immediate Post OP Note    PreOp Diagnosis: Critical right leg ischemia with toe gangrene.      PostOp Diagnosis: Same.      Procedure(s):  1) Right common femoral and profunda femoral endarterectomy with greater saphenous vein patch angioplasty.  2) Right common femoral to below-knee popliteal bypass using reversed right greater saphenous vein.  3) Right iliac angiogram.  4) Right iliac angioplasty.- Wound Class: Clean    Surgeon(s):  José Luis Flores M.D.    Anesthesiologist/Type of Anesthesia:  Anesthesiologist: Gutierrez Eisenberg M.D./General    Surgical Staff:  Assistant: Kim Shields P.A.-C.  Circulator: Roxana Carbajal R.N.; Williams Carter R.N.  Relief Circulator: David Diaz R.N.  Scrub Person: Jalen Stringer  Radiology Technologist: Jeannine Guadalupe    Specimens removed if any:  * No specimens in log *    Estimated Blood Loss: 100 mL.    IV fluids: 400 mL.    Contrast used: 12 mL Visipaque.    Findings: Moderate to severe right external iliac artery stenosis, successfully treated with angioplasty.  Brisk Doppler flow signal in right dorsalis pedis and posterior tibial artery post procedure.    Complications: None.    Dictated, #[id number].        11/21/2024 2:14 PM José Luis Flores M.D.

## 2024-11-21 NOTE — CARE PLAN
The patient is Stable - Low risk of patient condition declining or worsening    Shift Goals  Clinical Goals: NPO, prep for sx, pain control, wound care  Patient Goals: pain control, comfort  Family Goals: NA    Progress made toward(s) clinical / shift goals:    Problem: Knowledge Deficit - Standard  Goal: Patient and family/care givers will demonstrate understanding of plan of care, disease process/condition, diagnostic tests and medications  Outcome: Progressing     Problem: Pain - Standard  Goal: Alleviation of pain or a reduction in pain to the patient’s comfort goal  Outcome: Progressing  Note: Paitient educated on 0 to 10 pain scale. Pain managed with pharmacologic and non-pharmacological interventions. See MAR. Pt verbilized understanding of interventions.      Problem: Infection  Goal: Will remain free from infection  Outcome: Progressing     Problem: Safety  Goal: Will remain free from injury  Outcome: Progressing  Goal: Will remain free from falls  Outcome: Progressing     Problem: Hemodynamics  Goal: Patient's hemodynamics, fluid balance and neurologic status will be stable or improve  Outcome: Progressing     Problem: Wound/ / Incision Healing  Goal: Patient's wound/surgical incision will decrease in size and heals properly  Outcome: Progressing     Problem: Fluid Volume  Goal: Fluid volume balance will be maintained  Outcome: Progressing     Problem: Bowel Elimination  Goal: Establish and maintain regular bowel function  Outcome: Progressing       Patient is not progressing towards the following goals:

## 2024-11-21 NOTE — PROGRESS NOTES
VASCULAR SURGERY PROGRESS NOTE        Angiogram showed multilevel arterial occlusive disease.    Plan to perform right common femoral endarterectomy, right external iliac artery stenting, righ femoral to below-knee popliteal bypass tomorrow.  Risks and benefits were discussed with patient who indicated understanding and would like to proceed.  All questions were answered.

## 2024-11-21 NOTE — OP REPORT
DATE OF SERVICE:  11/20/2024     SURGEON:  José Luis Flores MD     ANESTHESIA:  Intravenous sedation with fentanyl and Versed for 30 minutes, and   local anesthesia with 10 mL of 1% lidocaine solution.     PREOPERATIVE DIAGNOSIS:  Peripheral arterial disease with right fourth and   fifth toe gangrene.     POSTOPERATIVE DIAGNOSIS:  Peripheral arterial disease with right fourth and   fifth toe gangrene.     PROCEDURES:    1.  Percutaneous cannulation of right common femoral artery under ultrasound   guidance.  2.  Catheter, abdominal aorta.  3.  Abdominal aortogram.  4.  Bilateral lower extremity angiogram.  5.  Application of StarClose, right common femoral artery.     INDICATIONS FOR PROCEDURE:  This is a pleasant 68-year-old male with multiple   medical problems, who was recently admitted for gangrenous right fourth and   fifth toes.  Noninvasive study was suggested and was consistent with   significant arterial occlusive disease.  Discussion was made with the patient.    He would like to undergo angiogram with possible endovascular intervention   if feasible and appropriate, fully understanding all risks.     DESCRIPTION OF PROCEDURE:  Informed consent was obtained.  The patient was   taken to the operating room and was placed in the supine position.  A timeout   procedure was done.  Intravenous sedation was performed with fentanyl and   Versed.  The patient was closely monitored.     Next, both of his groins were sterilely prepped and draped in the normal   fashion.  Duplex evaluation of the right common femoral artery was performed.    It was found to have plaque formation.  Under ultrasound guidance, the right   common femoral artery was percutaneously cannulated at the relatively   disease-free area, after the skin and subcutaneous tissue were anesthetized   with 10 mL of 1% lidocaine solution.  A guidewire was passed through the   needle.  The needle was removed and replaced with a 5-[place]aur[place] sheath.  Over  the   guidewire, an Omniflush catheter was advanced into the abdominal aorta and   positioned at L2 vertebral level.  An abdominal aortogram was performed.     Next, the catheter was retracted down to the aortic bifurcation.  Bilateral   lower extremity angiogram was obtained.     The catheter was removed over the guidewire.  The 5-[place] sheath was removed.    StarClose was deployed with good hemostasis achieved.  Hemostasis was   further optimized with manual compression.  Sterile dressing was applied.     IMPRESSION:    1.  Patent infrarenal abdominal aorta with mild diffuse plaque formation.  2.  The right common iliac artery is patent with diffuse arterial wall   calcification.  The right internal iliac artery is chronically occluded.  The   right distal external iliac artery has moderate to severe stenosis distally.  3.  The left common iliac artery is patent with diffuse arterial wall   calcification.  The left external iliac artery has one area of severe stenosis   distally.  4.  The right common femoral artery has significant stenosis secondary to   calcific plaque formation.  The right profunda femoral artery is patent.  The   right superficial femoral artery is chronically occluded with reconstitution   of flow seen in the proximal popliteal artery.  The above-knee popliteal   artery appears to be diseased.  The below-knee popliteal artery is patent with   3-vessel runoff seen.  Distally, the anterior tibial artery is the dominant   runoff vessel.  5.  Patent left common femoral and profunda femoral arteries.  The left   superficial femoral artery is diffusively diseased, but without significant   focal stenosis identified.  The distal circulation of the left lower extremity   was not evaluated in order to minimize the amount of contrast used.     ESTIMATED BLOOD LOSS:  10 mL.     CONTRAST USED:  53 mL Visipaque.     COUNTS:  Sponge and instrument count was correct x2.     The patient was then awakened  and taken to recovery area in stable condition.    The plan is to perform right common femoral endarterectomy, right external   iliac artery stenting, and right femoral to below-knee popliteal bypass.        ______________________________  MD KIRA Means/ROBIN    DD:  11/20/2024 16:17  DT:  11/20/2024 16:42    Job#:  [id number]

## 2024-11-21 NOTE — OR SURGEON
Immediate Post OP Note    PreOp Diagnosis: Peripheral arterial disease with right fourth and fifth toe gangrene.      PostOp Diagnosis: Same.      Procedure(s):  Abdominal aortogram and bilateral lower extremity angiogram  - Wound Class: Clean    Surgeon(s):  José Luis Flores M.D.    Type of Anesthesia: IV sedation for 30 minutes and local anesthesia with 10 mL 1% lidocaine solution.    Surgical Staff:  Circulator: Madelyn Dunn R.N.  Scrub Person: Jailene Walters  Radiology Technologist: Mine Christian  Radiology Nurse: Bo Diop R.N.    Specimens removed if any:  * No specimens in log *    Estimated Blood Loss: 10 mL.    Contrast used: 53 mL Visipaque.    Findings: Multilevel arterial occlusive disease.    Complications: None.    Dictated, #[id number].        11/20/2024 4:10 PM José Luis Flores M.D.   Spoke with Jeniffer 

## 2024-11-21 NOTE — PROGRESS NOTES
Vascular Surgery.    Right lower extremity revascularization was successfully performed.    Keep patient n.p.o. after midnight as Dr. Moreau is planning to perform a right transmetatarsal amputation tomorrow.

## 2024-11-21 NOTE — CARE PLAN
The patient is Stable - Low risk of patient condition declining or worsening    Shift Goals  Clinical Goals: NPO for SX, IV ABX, Wound Care  Patient Goals: Comfort  Family Goals: not present    Progress made toward(s) clinical / shift goals:      Problem: Knowledge Deficit - Standard  Goal: Patient and family/care givers will demonstrate understanding of plan of care, disease process/condition, diagnostic tests and medications  Outcome: Progressing  Education provided on plan of care this shift. Questions answered. Patient verbalizes understanding.      Problem: Wound/ / Incision Healing  Goal: Patient's wound/surgical incision will decrease in size and heals properly  Outcome: Progressing  Provided wound care per order.      Problem: Pain - Standard  Goal: Alleviation of pain or a reduction in pain to the patient’s comfort goal  Outcome: Progressing  Patient educated on pain scale. Encouraged patient to verbalize pain.

## 2024-11-21 NOTE — OP REPORT
DATE OF SERVICE:  11/21/2024     SURGEON:  José Luis Flores MD     ASSISTANT:  Kim Shields PA-C     ANESTHESIOLOGIST:  Gutierrez Eisenberg MD     TYPE OF ANESTHESIA:  General anesthesia.     PREOPERATIVE DIAGNOSIS:  Critical right leg ischemia with toe gangrene.     POSTOPERATIVE DIAGNOSIS:  Critical right leg ischemia with toe gangrene.     PROCEDURES:   1.  Right common femoral and profunda femoral endarterectomy with greater   saphenous vein patch angioplasty.  2.  Right common femoral to below knee popliteal bypass using reversed right   greater saphenous vein.  3.  Right iliac angiogram.  4.  Right external iliac artery angioplasty using 8 x 40 mm angioplasty   balloon.    INDICATIONS FOR PROCEDURE:  This is a pleasant 68-year-old male with multiple   medical problems, who was admitted with gangrenous right fourth and fifth   toes.  He underwent angiogram, which showed multilevel arterial occlusive   disease.  Discussion was made with the patient.  He would like to undergo   above procedures for limb salvage, fully understanding all risks.  The plan   would be to perform the revascularization first and Dr. Moreau will perform   the right transmetatarsal amputation afterwards.     DESCRIPTION OF PROCEDURE:  Informed consent was obtained.  The patient was   taken to the operating room and was placed in the supine position.  He was   given Ancef intravenously.  General anesthesia was induced.  Fam catheter   was placed under sterile conditions.     Next, his right lower abdomen and right leg were sterilely prepped and draped   in the normal fashion.  A timeout procedure was done.  An oblique incision was   made over the common femoral artery.  The incision was extended through   subcutaneous tissue using the electrocautery and Harmonic scalpel.  The distal   external iliac artery, common femoral, profunda femoral, and superficial   femoral arteries were identified and carefully dissected.  The superficial    femoral artery was found to be chronically occluded.  The common femoral   artery was found to be heavily calcified throughout.     Next, the incision was extended over the course of the greater saphenous vein   into the mid lower leg.  The incision was extended through subcutaneous tissue   using the electrocautery.  The greater saphenous vein was identified,   carefully dissected free from the surrounding tissue.  The side branches were   ligated with 4-0 silk ties, clipped with Hemoclips and divided.     Through the same incision in the proximal lower leg, dissection was extended   deeper.  The fascia was opened.  The below knee popliteal artery was   identified and carefully dissected.  It was found to be as a relatively soft,   but had essentially no flow.     The patient was given heparin 6000 units intravenously.  After the heparin was   allowed to circulate systemically for 3 minutes, vascular control of the   profunda femoral artery was obtained with vascular clamps.  Vascular control   of the external iliac artery was also obtained with vascular clamps.  An   arteriotomy was made from the external iliac artery extended through the   common femoral artery into the profunda femoral artery.  There was severe   stenosis of the common femoral artery seen caused by heavily calcific plaque.    Thromboendarterectomy of the common femoral as well as profunda femoral   arteries was performed.  The intima on the profunda femoral artery was tacked   with interrupted 7-0 Prolene sutures.  The arterial lumen was thoroughly   irrigated with heparinized saline solution and was found to be free of any   debris.     Next, a segment of the greater saphenous vein was brought into the operative   field, opened longitudinally and used as a patch angioplasty from the external   iliac artery, extended through the common femoral artery into the profunda   femoral artery using running 6-0 Prolene sutures.     Next, the remaining  greater saphenous vein was harvested from the   saphenofemoral junction down to the mid calf.  The saphenofemoral junction was   oversewn with 5-0 Prolene suture.  An opening was made over the patch   angioplasty site.  The greater saphenous vein was prepared and was found to be   appropriate for use as bypass conduit.  The greater saphenous vein was used in   a reversed manner.  It was beveled, opened posteriorly, and anastomosed   end-to-side to the common femoral artery over the patch angioplasty site using   running 6-0 Prolene suture.  Prior to completing the anastomosis,   backbleeding and flushing were obtained.  The anastomosis was completed.  Flow   was first restored through the bypass graft and then into the profunda femoral   artery.  The bypass graft was clamped above the anastomosis.     Next, the bypass graft was tunneled anatomically into the below-knee popliteal artery   area with care taken to avoid twisting or kinking of the bypass graft.    Vascular control of the popliteal artery was obtained with vascular clamps.    An arteriotomy was made on the medial aspect of the popliteal artery.  The arteriotomy was widened by resecting a lip of the arterial wall.  The bypass graft was cut to appropriate length, beveled, and anastomosed end-to-side to the popliteal artery using running 6-0 Prolene suture.  Prior to completing anastomosis, backbleeding and flushing were obtained.    The anastomosis was completed.  Flow was restored first retrograde into the   proximal popliteal and then antegrade into the distal popliteal.  A sterile   Doppler probe was brought into the operative field.  Excellent Doppler flow   signals were obtained over the popliteal artery below the anastomosis.  With   temporary compression of the bypass graft, there was significant attenuation   of the arterial flow signals.  Excellent Doppler flow signals were also obtainable at the profunda femoral artery below the patch angioplasty  site.    The common femoral artery was then cannulated above the bypass graft anastomosis using a access needle.  A guidewire was passed through the needle.  The needle was removed and replaced with a 5 Barb[place] sheath.  A right iliac angiogram was obtained using diluted contrast.  There was severe stenosis seen just above the endarterectomy site.  Moderate stenosis was also seen in the proximal external iliac artery.  Angioplasty of these areas was performed using 8 x 40 mm angioplasty balloon.  Follow-up angiogram was obtained and showed complete restoration of luminal patency with preservation of distal flow.  The 5 [place] sheath was removed and the puncture site was closed with 6-0 Prolene suture.     The wound was irrigated and was found to have good hemostasis.  The wound   was closed subcutaneously in layers with running 3-0 Vicryl sutures.  The   skin edges in the groin wound were reapproximated with 4-0 Monocryl in a   subcuticular manner.  The remaining skin incision was reapproximated with a   skin stapler.  The wounds were cleaned.  A small Prevena dressing was applied   over the groin incision and sterile dressings were applied over the remaining of the   incision.     ESTIMATED BLOOD LOSS:  100 mL.     INTRAVENOUS FLUIDS:  400 mL.     COUNTS:  Sponge and instrument count was correct x2.     The patient was then awakened, extubated, taken to recovery area in stable   condition with brisk Doppler flow signals over the right posterior tibial and   dorsalis pedis arteries.        ______________________________  José Luis Flores MD    TQN/FABRIZIO    DD:  11/21/2024 14:24  DT:  11/21/2024 15:50    Job#:  [id number]

## 2024-11-21 NOTE — ANESTHESIA PREPROCEDURE EVALUATION
" Case: [id number] Date/Time: 11/21/24 0915    Procedure: CREATION, BYPASS, ARTERIAL, FEMORAL TO TIBIAL - RIGHT LOWER EXTREMITY    Location: [facility] OR 06 / SURGERY [facility]    Surgeons: José Luis Flores M.D.            Past Medical History:   Diagnosis Date    CVA (cerebral vascular accident) (HCC)     H/O mumps     High cholesterol     History of chickenpox     Hyperlipidemia     Hypertension        Past Surgical History:   Procedure Laterality Date    ORIF, HAND  2013    Right hand        Current Outpatient Medications   Medication Instructions    aspirin 81 mg, Oral, DAILY    atorvastatin (LIPITOR) 80 MG tablet Take 1 tablet by mouth every night    lisinopril (PRINIVIL) 30 mg, Oral, DAILY    metoprolol tartrate (LOPRESSOR) 25 MG Tab Take 1/2 tablet by mouth twice a day       Social History     Tobacco Use    Smoking status: Every Day     Current packs/day: 0.50     Average packs/day: 0.3 packs/day for 56.9 years (14.9 ttl pk-yrs)     Types: Cigarettes     Start date: 1968    Smokeless tobacco: Never   Vaping Use    Vaping status: Never Used   Substance Use Topics    Alcohol use: Yes     Alcohol/week: 12.6 oz     Types: 21 Cans of beer per week     Comment: 3 beers per day    Drug use: No       /68   Pulse 65   Temp 37 °C (98.6 °F) (Temporal)   Resp 15   Ht 1.981 m (6' 6\")   Wt 82.5 kg (181 lb 14.1 oz)   SpO2 93%     No Known Allergies    Lab Results   Component Value Date/Time    SODIUM 134 (L) 11/21/2024 06:23 AM    POTASSIUM 3.5 (L) 11/21/2024 06:23 AM    CHLORIDE 102 11/21/2024 06:23 AM    GLUCOSE 98 11/21/2024 06:23 AM    BUN 11 11/21/2024 06:23 AM    CREATININE 0.69 11/21/2024 06:23 AM        Lab Results   Component Value Date/Time    WBC 9.6 11/21/2024 06:23 AM    RBC 4.05 (L) 11/21/2024 06:23 AM    HEMOGLOBIN 13.3 (L) 11/21/2024 06:23 AM    HEMATOCRIT 37.8 (L) 11/21/2024 06:23 AM    PLATELETCT 238 11/21/2024 06:23 AM      Lexiscan 10/3/2023:  IMPRESSION:   1.  No evidence for Lexiscan induced " ischemia or infarction.  2.  Normal wall motion.  3.  Ejection fraction 71%.    TTE 7/2022:  CONCLUSIONS  Mild concentric left ventricular hypertrophy. Normal left ventricular. EF 65%  size and systolic function.  No evidence of shunt by agitated saline study.  Normal pericardium without effusion.    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)       Physical Exam    Airway   Mallampati: II  TM distance: >3 FB  Neck ROM: full       Cardiovascular - normal exam  Rhythm: regular  Rate: normal  (-) murmur     Dental   (+) upper dentures, lower dentures           Pulmonary - normal exam  Breath sounds clear to auscultation     Abdominal    Neurological - normal exam                   Anesthesia Plan    ASA 3   ASA physical status 3 criteria: CVA or TIA - history (> 3 months)    Plan - general       Airway plan will be ETT    (Arterial line)      Induction: intravenous    Postoperative Plan: Postoperative administration of opioids is intended.    Pertinent diagnostic labs and testing reviewed    Informed Consent:    Anesthetic plan and risks discussed with patient.    Use of blood products discussed with: patient whom consented to blood products.       Anesthetic procedure and risks discussed with patient in detail.  Risks include but are not limited to PONV, pain, sore throat, damage to teeth/lips/gums, aspiration, positioning injury, allergic reaction, vocal cord injury, prolonged intubation, stroke, and/or cardiopulmonary problems up to and including death.  Patient indicates complete understanding. All questions fully answered and they agree to proceed as planned above.

## 2024-11-21 NOTE — PROGRESS NOTES
Hospital Medicine Daily Progress Note    Date of Service  11/21/2024    Chief Complaint  Arnulfo Crocker is a 68 y.o. male admitted 11/18/2024 with right foot pain    Hospital Course  68-year-old male with past medical history of CVA, hypertension, and tobacco use who presents due to progressive wound of his right foot complicated by gangrene over the lateral aspect of the foot with necrosis of fourth and fifth digits. CT of the lower extremity with multiple areas of arterial stenosis. Vascular surgery was consulted. X-ray of the right foot demonstrating osteomyelitis.    Interval Problem Update  11/19 Patient noted discoloration of his right foot about 4 weeks ago. He has PAD and continues to smoke. CT reveals significant by bilateral PAD. Vascular surgery consulted.  I discussed the case with vascular surgery, plan for angiogram.  I consulted orthopedics who will evaluate the patient.  Continue IV abx for osteomyelitis.     11/20 Patient is planned for angiogram today for possible vascular intervention. Continue IV abx. Will consult ortho once vascular issues have been addressed.  K is low, added K replacement     11/21 Patient underwent Right common femoral and profunda femoral endarterectomy and  Right common femoral to below knee popliteal bypass using reversed right greater saphenous vein. Plan for transmetatarsal amputation with Ortho. Continue pain control with  oxycodone and IV dilaudid. Continue abx.     I have discussed this patient's plan of care and discharge plan at IDT rounds today with Case Management, Nursing, Nursing leadership, and other members of the IDT team.    Consultants/Specialty  orthopedics and vascular surgery    Code Status  DNAR/DNI    Disposition  Not medically cleared   I have placed the appropriate orders for post-discharge needs.    Review of Systems  Review of Systems   Skin:         Right foot with necrotic fourth and fifth toes        Physical Exam  Temp:  [36 °C (96.8 °F)-37.6 °C  (99.6 °F)] 36 °C (96.8 °F)  Pulse:  [53-71] 69  Resp:  [12-24] 16  BP: ()/(52-81) 110/67  SpO2:  [90 %-98 %] 96 %    Physical Exam  Vitals and nursing note reviewed.   Constitutional:       General: He is not in acute distress.  HENT:      Head: Normocephalic.      Mouth/Throat:      Mouth: Mucous membranes are moist.   Eyes:      Pupils: Pupils are equal, round, and reactive to light.   Cardiovascular:      Rate and Rhythm: Normal rate and regular rhythm.      Pulses: Normal pulses.      Heart sounds: Normal heart sounds.   Pulmonary:      Effort: Pulmonary effort is normal.      Breath sounds: Normal breath sounds.   Abdominal:      Palpations: Abdomen is soft.      Tenderness: There is no abdominal tenderness.   Musculoskeletal:         General: No swelling.      Cervical back: Neck supple.      Comments: Right foot redness and necrotic  appearing 4th and 5th digits    Skin:     General: Skin is warm.      Coloration: Skin is not jaundiced.   Neurological:      General: No focal deficit present.      Mental Status: He is alert and oriented to person, place, and time.   Psychiatric:         Mood and Affect: Mood normal.         Behavior: Behavior normal.         Fluids    Intake/Output Summary (Last 24 hours) at 11/21/2024 1550  Last data filed at 11/21/2024 1354  Gross per 24 hour   Intake 400 ml   Output 750 ml   Net -350 ml        Laboratory  Recent Labs     11/19/24  0830 11/20/24  0054 11/21/24  0623   WBC 10.9* 9.6 9.6   RBC 4.12* 3.93* 4.05*   HEMOGLOBIN 13.4* 12.7* 13.3*   HEMATOCRIT 38.2* 36.2* 37.8*   MCV 92.7 92.1 93.3   MCH 32.5 32.3 32.8   MCHC 35.1 35.1 35.2   RDW 51.5* 51.5* 51.7*   PLATELETCT 274 244 238   MPV 8.8* 9.0 9.1     Recent Labs     11/19/24  0830 11/20/24  0054 11/21/24  0623   SODIUM 134* 131* 134*   POTASSIUM 3.9 3.5* 3.5*   CHLORIDE 101 99 102   CO2 21 21 21   GLUCOSE 109* 110* 98   BUN 11 10 11   CREATININE 0.72 0.60 0.69   CALCIUM 8.7 7.9* 8.0*                    Imaging  DX-OPERATIVE ANGIOGRAM EACH PROJ   Preliminary Result      Digitized intraoperative radiograph is submitted for review. This examination is not for diagnostic purpose but for guidance during a surgical procedure. Please see the patient's chart for full procedural details.         INTERPRETING LOCATION: [address], [place], [address]      DX-PORTABLE FLUORO > 1 HOUR   Preliminary Result      Portable fluoroscopy utilized for 48 seconds.      INTERPRETING LOCATION: [address], [place], [address]      CT-CTA LOWER EXT WITH & W/O-POST PROCESS RIGHT   Final Result         1.  Extensive bilateral atherosclerotic plaque.   2.  Occlusion of the right superficial femoral artery which reconstitutes distally. Greater than 70% stenosis of the distal right superficial femoral artery.   3.  Greater than 70% stenosis of the distal right external iliac artery,   4.  50-70% stenosis of the right tibioperoneal trunk with three-vessel runoff at the right ankle.   5.  Greater than 70% stenosis of the left distal external iliac artery.   6.  50-70% stenosis of the distal left superficial femoral artery   7.  50-70% stenosis of the left popliteal artery.   8.  Greater than 70% stenosis of the left tibioperoneal trunk with occlusion of the proximal posterior tibial artery. Small caliber left posterior tibial artery is seen distally otherwise there appears to be three-vessel runoff of the left ankle.   9.  Soft tissue wound of the right fourth and fifth toes with air in the fifth toe bony structures, compatible with osteomyelitis   10.  Soft tissue edema of the right leg below the knee.      These findings were discussed with the patient's clinician, Luiz Richardson, on 11/18/2024 10:07 PM.      DX-CHEST-PORTABLE (1 VIEW)   Final Result         1.  Right basilar atelectasis and/or subtle infiltrates.   2.  Trace right pleural effusion   3.  Right rib fractures are seen, appear likely subacute      IR-ABDOMINAL AORTA-WITH RUNOFF     (Results Pending)        Assessment/Plan  * Osteomyelitis of Right 4th and 5th toe(HCC)- (present on admission)  Assessment & Plan  At [facility], x-ray presented osteomyelitis as well as some soft tissue gas on the right foot.  Ultrasound presented significant peripheral artery disease.  Received 1 dose of ceftriaxone and vancomycin.  Transferred to [facility] for vascular consult.  At [facility], CTA lower extremity presented extensive bilateral arthrosclerotic plaque and occlusion of right superficial femoral artery, greater then 70% stenosis of the distal right external iliac artery and multiple stenosis.  ED physician contacted vascular surgeon who is planning to evaluate tomorrow 11/19/2024.  Received 1 dose of metronidazole  -Admitted Med/Surg floor  -Unasyn and linezolid  -Follow-up blood and wound culture collected on 11/19-20/2024   Vascular surgery consulted,   Patient underwent Right common femoral and profunda femoral endarterectomy and  Right common femoral to below knee popliteal bypass using reversed right greater saphenous vein. Plan for transmetatarsal amputation with Ortho.     Hyperlipidemia  Assessment & Plan  -Continue home atorvastatin    SIRS (systemic inflammatory response syndrome) (HCC)  Assessment & Plan  SIRS criteria identified on my evaluation include:  Tachycardia, with heart rate greater than 90 BPM and Leukocytosis, with WBC greater than 12,000  SIRS is non-infectious, the patient does not have sepsis  Elevated C-reactive, likely reactive leukocytosis secondary to osteomyelitis    Left-sided cerebrovascular accident (CVA) (HCC)- (present on admission)  Assessment & Plan  Left frontal lobe/insular infarct 7/2/2022  -Hold home aspirin due to possible amputation scheduled on 1119/24    Tobacco use disorder  Assessment & Plan  - Nicotine patch placed    Essential hypertension- (present on admission)  Assessment & Plan  -Continue home lisinopril and metoprolol tartrate         VTE  prophylaxis: scd    I have performed a physical exam and reviewed and updated ROS and Plan today (11/21/2024). In review of yesterday's note (11/20/2024), there are no changes except as documented above.    I spent 51 minutes, reviewing the chart, obtaining and/or reviewing separately obtained history. Performing a medically appropriate examination and evaluation.  Counseling and educating the patient. Ordering and reviewing medications, tests, or procedures.  Discussing the case with Ortho and Vascular surgery.  Documenting clinical information in EPIC. Independently interpreting results and communicating results to patient. Discussing future disposition of care with patient, RN and case management.

## 2024-11-21 NOTE — OR NURSING
PACU note- Respirations easy. Gauze, transparent film and elastoplast dressing clean and dry to right groin, no bleeding, no hematoma.  Patient knows that he needs to maintain flat bedrest with leg straight for three hours.  Report to floor.

## 2024-11-21 NOTE — PROGRESS NOTES
1517  Report received from Alyson MUNGUIA.    9798  Pt arrived to unit via floor bed. A&O x 4, pain 7/10, on 3L O2, dressing to R inner thigh in place, with all belongings at bedside. Pt oriented to unit, call light and belongings within reach, educated to call for assistance.

## 2024-11-21 NOTE — ANESTHESIA PROCEDURE NOTES
Arterial Line    Performed by: Gutierrez Eisenberg M.D.  Authorized by: Gutierrez Eisenberg M.D.    Start Time:  11/21/2024 10:29 AM  Localization: ultrasound guidance  Image captured, interpreted and electronically stored.  Patient Location:  OR  Indication: continuous blood pressure monitoring        Catheter Size:  20 G  Seldinger Technique?: Yes    Laterality:  Left  Site:  Radial artery  Line Secured:  Antimicrobial disc, tape and transparent dressing  Events: patient tolerated procedure well with no complications     Placed with one attempt under direct U/S guidance

## 2024-11-21 NOTE — ANESTHESIA TIME REPORT
Anesthesia Start and Stop Event Times       Date Time Event    11/21/2024 1006 Ready for Procedure     1016 Anesthesia Start     1407 Anesthesia Stop          Responsible Staff  11/21/24      Name Role Begin End    Gutierrez Eisenberg M.D. Anesth 1016 140          Overtime Reason:  no overtime (within assigned shift)    Comments:

## 2024-11-21 NOTE — CARE PLAN
The patient is Stable - Low risk of patient condition declining or worsening    Shift Goals  Clinical Goals: npo for surgery, IV abx  Patient Goals: comfort  Family Goals: not present    Progress made toward(s) clinical / shift goals:  yes    Problem: Knowledge Deficit - Standard  Goal: Patient and family/care givers will demonstrate understanding of plan of care, disease process/condition, diagnostic tests and medications  Outcome: Progressing     Problem: Pain - Standard  Goal: Alleviation of pain or a reduction in pain to the patient’s comfort goal  Outcome: Progressing     Problem: Infection  Goal: Will remain free from infection  Outcome: Progressing     Problem: Safety  Goal: Will remain free from injury  Outcome: Progressing  Goal: Will remain free from falls  Outcome: Progressing     Problem: Hemodynamics  Goal: Patient's hemodynamics, fluid balance and neurologic status will be stable or improve  Outcome: Progressing     Problem: Wound/ / Incision Healing  Goal: Patient's wound/surgical incision will decrease in size and heals properly  Outcome: Progressing     Problem: Fluid Volume  Goal: Fluid volume balance will be maintained  Outcome: Progressing     Problem: Bowel Elimination  Goal: Establish and maintain regular bowel function  Outcome: Progressing

## 2024-11-22 ENCOUNTER — ANESTHESIA (OUTPATIENT)
Dept: SURGERY | Facility: MEDICAL CENTER | Age: 69
End: 2024-11-22
Payer: MEDICARE

## 2024-11-22 ENCOUNTER — ANESTHESIA EVENT (OUTPATIENT)
Dept: SURGERY | Facility: MEDICAL CENTER | Age: 69
End: 2024-11-22
Payer: MEDICARE

## 2024-11-22 LAB
ANION GAP SERPL CALC-SCNC: 11 MMOL/L (ref 7–16)
BACTERIA WND AEROBE CULT: ABNORMAL
BUN SERPL-MCNC: 12 MG/DL (ref 8–22)
CALCIUM SERPL-MCNC: 7.7 MG/DL (ref 8.5–10.5)
CHLORIDE SERPL-SCNC: 101 MMOL/L (ref 96–112)
CO2 SERPL-SCNC: 24 MMOL/L (ref 20–33)
CREAT SERPL-MCNC: 0.71 MG/DL (ref 0.5–1.4)
ERYTHROCYTE [DISTWIDTH] IN BLOOD BY AUTOMATED COUNT: 52.9 FL (ref 35.9–50)
GFR SERPLBLD CREATININE-BSD FMLA CKD-EPI: 99 ML/MIN/1.73 M 2
GLUCOSE SERPL-MCNC: 132 MG/DL (ref 65–99)
GRAM STN SPEC: ABNORMAL
HCT VFR BLD AUTO: 37.2 % (ref 42–52)
HGB BLD-MCNC: 12.6 G/DL (ref 14–18)
MCH RBC QN AUTO: 32.1 PG (ref 27–33)
MCHC RBC AUTO-ENTMCNC: 33.9 G/DL (ref 32.3–36.5)
MCV RBC AUTO: 94.7 FL (ref 81.4–97.8)
PATHOLOGY CONSULT NOTE: NORMAL
PLATELET # BLD AUTO: 223 K/UL (ref 164–446)
PMV BLD AUTO: 9.1 FL (ref 9–12.9)
POTASSIUM SERPL-SCNC: 3.8 MMOL/L (ref 3.6–5.5)
RBC # BLD AUTO: 3.93 M/UL (ref 4.7–6.1)
SIGNIFICANT IND 70042: ABNORMAL
SITE SITE: ABNORMAL
SODIUM SERPL-SCNC: 136 MMOL/L (ref 135–145)
SOURCE SOURCE: ABNORMAL
WBC # BLD AUTO: 9.7 K/UL (ref 4.8–10.8)

## 2024-11-22 PROCEDURE — 99232 SBSQ HOSP IP/OBS MODERATE 35: CPT | Mod: 57 | Performed by: STUDENT IN AN ORGANIZED HEALTH CARE EDUCATION/TRAINING PROGRAM

## 2024-11-22 PROCEDURE — 0Y6M0ZD DETACHMENT AT RIGHT FOOT, PARTIAL 4TH RAY, OPEN APPROACH: ICD-10-PCS | Performed by: STUDENT IN AN ORGANIZED HEALTH CARE EDUCATION/TRAINING PROGRAM

## 2024-11-22 PROCEDURE — 160002 HCHG RECOVERY MINUTES (STAT): Performed by: STUDENT IN AN ORGANIZED HEALTH CARE EDUCATION/TRAINING PROGRAM

## 2024-11-22 PROCEDURE — 28805 AMPUTATION THRU METATARSAL: CPT | Mod: RT | Performed by: STUDENT IN AN ORGANIZED HEALTH CARE EDUCATION/TRAINING PROGRAM

## 2024-11-22 PROCEDURE — 700102 HCHG RX REV CODE 250 W/ 637 OVERRIDE(OP): Performed by: SURGERY

## 2024-11-22 PROCEDURE — 160009 HCHG ANES TIME/MIN: Performed by: STUDENT IN AN ORGANIZED HEALTH CARE EDUCATION/TRAINING PROGRAM

## 2024-11-22 PROCEDURE — 700101 HCHG RX REV CODE 250: Performed by: STUDENT IN AN ORGANIZED HEALTH CARE EDUCATION/TRAINING PROGRAM

## 2024-11-22 PROCEDURE — 88307 TISSUE EXAM BY PATHOLOGIST: CPT

## 2024-11-22 PROCEDURE — 770001 HCHG ROOM/CARE - MED/SURG/GYN PRIV*

## 2024-11-22 PROCEDURE — 0Y6M0ZB DETACHMENT AT RIGHT FOOT, PARTIAL 2ND RAY, OPEN APPROACH: ICD-10-PCS | Performed by: STUDENT IN AN ORGANIZED HEALTH CARE EDUCATION/TRAINING PROGRAM

## 2024-11-22 PROCEDURE — 160035 HCHG PACU - 1ST 60 MINS PHASE I: Performed by: STUDENT IN AN ORGANIZED HEALTH CARE EDUCATION/TRAINING PROGRAM

## 2024-11-22 PROCEDURE — 160038 HCHG SURGERY MINUTES - EA ADDL 1 MIN LEVEL 2: Performed by: STUDENT IN AN ORGANIZED HEALTH CARE EDUCATION/TRAINING PROGRAM

## 2024-11-22 PROCEDURE — 700102 HCHG RX REV CODE 250 W/ 637 OVERRIDE(OP)

## 2024-11-22 PROCEDURE — 85027 COMPLETE CBC AUTOMATED: CPT

## 2024-11-22 PROCEDURE — 700101 HCHG RX REV CODE 250: Performed by: ANESTHESIOLOGY

## 2024-11-22 PROCEDURE — 0Y6M0Z9 DETACHMENT AT RIGHT FOOT, PARTIAL 1ST RAY, OPEN APPROACH: ICD-10-PCS | Performed by: STUDENT IN AN ORGANIZED HEALTH CARE EDUCATION/TRAINING PROGRAM

## 2024-11-22 PROCEDURE — 0Y6M0ZC DETACHMENT AT RIGHT FOOT, PARTIAL 3RD RAY, OPEN APPROACH: ICD-10-PCS | Performed by: STUDENT IN AN ORGANIZED HEALTH CARE EDUCATION/TRAINING PROGRAM

## 2024-11-22 PROCEDURE — 700105 HCHG RX REV CODE 258

## 2024-11-22 PROCEDURE — 80048 BASIC METABOLIC PNL TOTAL CA: CPT

## 2024-11-22 PROCEDURE — 0Y6M0ZF DETACHMENT AT RIGHT FOOT, PARTIAL 5TH RAY, OPEN APPROACH: ICD-10-PCS | Performed by: STUDENT IN AN ORGANIZED HEALTH CARE EDUCATION/TRAINING PROGRAM

## 2024-11-22 PROCEDURE — 700111 HCHG RX REV CODE 636 W/ 250 OVERRIDE (IP): Mod: JZ

## 2024-11-22 PROCEDURE — 99233 SBSQ HOSP IP/OBS HIGH 50: CPT | Performed by: INTERNAL MEDICINE

## 2024-11-22 PROCEDURE — 36415 COLL VENOUS BLD VENIPUNCTURE: CPT

## 2024-11-22 PROCEDURE — 700105 HCHG RX REV CODE 258: Performed by: ANESTHESIOLOGY

## 2024-11-22 PROCEDURE — A9270 NON-COVERED ITEM OR SERVICE: HCPCS | Performed by: SURGERY

## 2024-11-22 PROCEDURE — 88311 DECALCIFY TISSUE: CPT

## 2024-11-22 PROCEDURE — 160048 HCHG OR STATISTICAL LEVEL 1-5: Performed by: STUDENT IN AN ORGANIZED HEALTH CARE EDUCATION/TRAINING PROGRAM

## 2024-11-22 PROCEDURE — 700111 HCHG RX REV CODE 636 W/ 250 OVERRIDE (IP): Performed by: ANESTHESIOLOGY

## 2024-11-22 PROCEDURE — 160027 HCHG SURGERY MINUTES - 1ST 30 MINS LEVEL 2: Performed by: STUDENT IN AN ORGANIZED HEALTH CARE EDUCATION/TRAINING PROGRAM

## 2024-11-22 PROCEDURE — A9270 NON-COVERED ITEM OR SERVICE: HCPCS

## 2024-11-22 PROCEDURE — 700111 HCHG RX REV CODE 636 W/ 250 OVERRIDE (IP): Mod: JZ | Performed by: SURGERY

## 2024-11-22 RX ORDER — HYDROMORPHONE HYDROCHLORIDE 1 MG/ML
0.4 INJECTION, SOLUTION INTRAMUSCULAR; INTRAVENOUS; SUBCUTANEOUS
Status: DISCONTINUED | OUTPATIENT
Start: 2024-11-22 | End: 2024-11-22 | Stop reason: HOSPADM

## 2024-11-22 RX ORDER — PHENYLEPHRINE HCL IN 0.9% NACL 1 MG/10 ML
SYRINGE (ML) INTRAVENOUS PRN
Status: DISCONTINUED | OUTPATIENT
Start: 2024-11-22 | End: 2024-11-22 | Stop reason: SURG

## 2024-11-22 RX ORDER — OXYCODONE HCL 5 MG/5 ML
10 SOLUTION, ORAL ORAL
Status: DISCONTINUED | OUTPATIENT
Start: 2024-11-22 | End: 2024-11-22 | Stop reason: HOSPADM

## 2024-11-22 RX ORDER — OXYCODONE HCL 5 MG/5 ML
5 SOLUTION, ORAL ORAL
Status: DISCONTINUED | OUTPATIENT
Start: 2024-11-22 | End: 2024-11-22 | Stop reason: HOSPADM

## 2024-11-22 RX ORDER — LIDOCAINE HYDROCHLORIDE 20 MG/ML
INJECTION, SOLUTION EPIDURAL; INFILTRATION; INTRACAUDAL; PERINEURAL PRN
Status: DISCONTINUED | OUTPATIENT
Start: 2024-11-22 | End: 2024-11-22 | Stop reason: SURG

## 2024-11-22 RX ORDER — SODIUM CHLORIDE 9 MG/ML
INJECTION, SOLUTION INTRAVENOUS CONTINUOUS
Status: CANCELLED | OUTPATIENT
Start: 2024-11-22

## 2024-11-22 RX ORDER — SODIUM CHLORIDE, SODIUM LACTATE, POTASSIUM CHLORIDE, CALCIUM CHLORIDE 600; 310; 30; 20 MG/100ML; MG/100ML; MG/100ML; MG/100ML
INJECTION, SOLUTION INTRAVENOUS
Status: DISCONTINUED | OUTPATIENT
Start: 2024-11-22 | End: 2024-11-22 | Stop reason: SURG

## 2024-11-22 RX ORDER — HYDROMORPHONE HYDROCHLORIDE 1 MG/ML
0.2 INJECTION, SOLUTION INTRAMUSCULAR; INTRAVENOUS; SUBCUTANEOUS
Status: DISCONTINUED | OUTPATIENT
Start: 2024-11-22 | End: 2024-11-22 | Stop reason: HOSPADM

## 2024-11-22 RX ORDER — DIPHENHYDRAMINE HYDROCHLORIDE 50 MG/ML
12.5 INJECTION INTRAMUSCULAR; INTRAVENOUS
Status: DISCONTINUED | OUTPATIENT
Start: 2024-11-22 | End: 2024-11-22 | Stop reason: HOSPADM

## 2024-11-22 RX ORDER — HYDROMORPHONE HYDROCHLORIDE 1 MG/ML
0.1 INJECTION, SOLUTION INTRAMUSCULAR; INTRAVENOUS; SUBCUTANEOUS
Status: DISCONTINUED | OUTPATIENT
Start: 2024-11-22 | End: 2024-11-22 | Stop reason: HOSPADM

## 2024-11-22 RX ORDER — EPHEDRINE SULFATE 50 MG/ML
INJECTION, SOLUTION INTRAVENOUS PRN
Status: DISCONTINUED | OUTPATIENT
Start: 2024-11-22 | End: 2024-11-22 | Stop reason: SURG

## 2024-11-22 RX ORDER — MIDAZOLAM HYDROCHLORIDE 1 MG/ML
INJECTION INTRAMUSCULAR; INTRAVENOUS PRN
Status: DISCONTINUED | OUTPATIENT
Start: 2024-11-22 | End: 2024-11-22 | Stop reason: SURG

## 2024-11-22 RX ORDER — LABETALOL HYDROCHLORIDE 5 MG/ML
5 INJECTION, SOLUTION INTRAVENOUS
Status: DISCONTINUED | OUTPATIENT
Start: 2024-11-22 | End: 2024-11-22 | Stop reason: HOSPADM

## 2024-11-22 RX ORDER — MEPERIDINE HYDROCHLORIDE 25 MG/ML
12.5 INJECTION INTRAMUSCULAR; INTRAVENOUS; SUBCUTANEOUS
Status: DISCONTINUED | OUTPATIENT
Start: 2024-11-22 | End: 2024-11-22 | Stop reason: HOSPADM

## 2024-11-22 RX ORDER — ONDANSETRON 2 MG/ML
4 INJECTION INTRAMUSCULAR; INTRAVENOUS
Status: DISCONTINUED | OUTPATIENT
Start: 2024-11-22 | End: 2024-11-22 | Stop reason: HOSPADM

## 2024-11-22 RX ORDER — ONDANSETRON 2 MG/ML
INJECTION INTRAMUSCULAR; INTRAVENOUS PRN
Status: DISCONTINUED | OUTPATIENT
Start: 2024-11-22 | End: 2024-11-22 | Stop reason: SURG

## 2024-11-22 RX ORDER — IPRATROPIUM BROMIDE AND ALBUTEROL SULFATE 2.5; .5 MG/3ML; MG/3ML
3 SOLUTION RESPIRATORY (INHALATION)
Status: DISCONTINUED | OUTPATIENT
Start: 2024-11-22 | End: 2024-11-22 | Stop reason: HOSPADM

## 2024-11-22 RX ORDER — HALOPERIDOL 5 MG/ML
1 INJECTION INTRAMUSCULAR
Status: DISCONTINUED | OUTPATIENT
Start: 2024-11-22 | End: 2024-11-22 | Stop reason: HOSPADM

## 2024-11-22 RX ORDER — HYDRALAZINE HYDROCHLORIDE 20 MG/ML
5 INJECTION INTRAMUSCULAR; INTRAVENOUS
Status: DISCONTINUED | OUTPATIENT
Start: 2024-11-22 | End: 2024-11-22 | Stop reason: HOSPADM

## 2024-11-22 RX ORDER — BUPIVACAINE HYDROCHLORIDE AND EPINEPHRINE 5; 5 MG/ML; UG/ML
INJECTION, SOLUTION EPIDURAL; INTRACAUDAL; PERINEURAL
Status: DISCONTINUED | OUTPATIENT
Start: 2024-11-22 | End: 2024-11-22 | Stop reason: HOSPADM

## 2024-11-22 RX ORDER — SODIUM CHLORIDE 9 MG/ML
INJECTION, SOLUTION INTRAVENOUS
Status: DISCONTINUED | OUTPATIENT
Start: 2024-11-22 | End: 2024-11-22 | Stop reason: SURG

## 2024-11-22 RX ORDER — DEXAMETHASONE SODIUM PHOSPHATE 4 MG/ML
INJECTION, SOLUTION INTRA-ARTICULAR; INTRALESIONAL; INTRAMUSCULAR; INTRAVENOUS; SOFT TISSUE PRN
Status: DISCONTINUED | OUTPATIENT
Start: 2024-11-22 | End: 2024-11-22 | Stop reason: SURG

## 2024-11-22 RX ADMIN — FENTANYL CITRATE 50 MCG: 50 INJECTION, SOLUTION INTRAMUSCULAR; INTRAVENOUS at 09:57

## 2024-11-22 RX ADMIN — MIDAZOLAM HYDROCHLORIDE 1 MG: 1 INJECTION, SOLUTION INTRAMUSCULAR; INTRAVENOUS at 09:09

## 2024-11-22 RX ADMIN — ENOXAPARIN SODIUM 40 MG: 100 INJECTION SUBCUTANEOUS at 17:54

## 2024-11-22 RX ADMIN — PROPOFOL 120 MG: 10 INJECTION, EMULSION INTRAVENOUS at 09:14

## 2024-11-22 RX ADMIN — Medication 200 MCG: at 09:48

## 2024-11-22 RX ADMIN — LIDOCAINE HYDROCHLORIDE 50 MG: 20 INJECTION, SOLUTION EPIDURAL; INFILTRATION; INTRACAUDAL; PERINEURAL at 09:14

## 2024-11-22 RX ADMIN — DOCUSATE SODIUM 100 MG: 100 CAPSULE, LIQUID FILLED ORAL at 05:02

## 2024-11-22 RX ADMIN — AMPICILLIN AND SULBACTAM 3 G: 1; 2 INJECTION, POWDER, FOR SOLUTION INTRAMUSCULAR; INTRAVENOUS at 05:01

## 2024-11-22 RX ADMIN — AMPICILLIN AND SULBACTAM 3 G: 1; 2 INJECTION, POWDER, FOR SOLUTION INTRAMUSCULAR; INTRAVENOUS at 00:44

## 2024-11-22 RX ADMIN — LINEZOLID 600 MG: 600 TABLET, FILM COATED ORAL at 17:54

## 2024-11-22 RX ADMIN — LINEZOLID 600 MG: 600 TABLET, FILM COATED ORAL at 05:02

## 2024-11-22 RX ADMIN — FENTANYL CITRATE 50 MCG: 50 INJECTION, SOLUTION INTRAMUSCULAR; INTRAVENOUS at 09:09

## 2024-11-22 RX ADMIN — PROPOFOL 50 MG: 10 INJECTION, EMULSION INTRAVENOUS at 09:25

## 2024-11-22 RX ADMIN — DEXAMETHASONE SODIUM PHOSPHATE 4 MG: 4 INJECTION INTRA-ARTICULAR; INTRALESIONAL; INTRAMUSCULAR; INTRAVENOUS; SOFT TISSUE at 09:23

## 2024-11-22 RX ADMIN — METOPROLOL TARTRATE 12.5 MG: 25 TABLET, FILM COATED ORAL at 16:27

## 2024-11-22 RX ADMIN — NICOTINE TRANSDERMAL SYSTEM 21 MG: 21 PATCH, EXTENDED RELEASE TRANSDERMAL at 05:02

## 2024-11-22 RX ADMIN — FENTANYL CITRATE 50 MCG: 50 INJECTION, SOLUTION INTRAMUSCULAR; INTRAVENOUS at 10:09

## 2024-11-22 RX ADMIN — METOPROLOL TARTRATE 12.5 MG: 25 TABLET, FILM COATED ORAL at 05:02

## 2024-11-22 RX ADMIN — CLOPIDOGREL BISULFATE 75 MG: 75 TABLET ORAL at 05:02

## 2024-11-22 RX ADMIN — DOCUSATE SODIUM 100 MG: 100 CAPSULE, LIQUID FILLED ORAL at 16:27

## 2024-11-22 RX ADMIN — AMPICILLIN AND SULBACTAM 3 G: 1; 2 INJECTION, POWDER, FOR SOLUTION INTRAMUSCULAR; INTRAVENOUS at 11:44

## 2024-11-22 RX ADMIN — ONDANSETRON 4 MG: 2 INJECTION INTRAMUSCULAR; INTRAVENOUS at 09:23

## 2024-11-22 RX ADMIN — AMPICILLIN AND SULBACTAM 3 G: 1; 2 INJECTION, POWDER, FOR SOLUTION INTRAMUSCULAR; INTRAVENOUS at 17:55

## 2024-11-22 RX ADMIN — ATORVASTATIN CALCIUM 80 MG: 40 TABLET, FILM COATED ORAL at 16:28

## 2024-11-22 RX ADMIN — LISINOPRIL 30 MG: 10 TABLET ORAL at 05:02

## 2024-11-22 RX ADMIN — EPHEDRINE SULFATE 20 MG: 50 INJECTION, SOLUTION INTRAVENOUS at 09:33

## 2024-11-22 RX ADMIN — Medication 100 MCG: at 09:26

## 2024-11-22 RX ADMIN — SODIUM CHLORIDE, POTASSIUM CHLORIDE, SODIUM LACTATE AND CALCIUM CHLORIDE: 600; 310; 30; 20 INJECTION, SOLUTION INTRAVENOUS at 09:50

## 2024-11-22 RX ADMIN — SODIUM CHLORIDE: 9 INJECTION, SOLUTION INTRAVENOUS at 09:06

## 2024-11-22 RX ADMIN — FENTANYL CITRATE 50 MCG: 50 INJECTION, SOLUTION INTRAMUSCULAR; INTRAVENOUS at 09:28

## 2024-11-22 RX ADMIN — ASPIRIN 81 MG: 81 TABLET, COATED ORAL at 05:02

## 2024-11-22 RX ADMIN — SENNOSIDES AND DOCUSATE SODIUM 2 TABLET: 50; 8.6 TABLET ORAL at 16:27

## 2024-11-22 RX ADMIN — Medication 100 MCG: at 09:17

## 2024-11-22 RX ADMIN — OXYCODONE 5 MG: 5 TABLET ORAL at 21:26

## 2024-11-22 ASSESSMENT — PAIN SCALES - GENERAL: PAIN_LEVEL: 2

## 2024-11-22 ASSESSMENT — PAIN DESCRIPTION - PAIN TYPE
TYPE: ACUTE PAIN;SURGICAL PAIN
TYPE: SURGICAL PAIN
TYPE: ACUTE PAIN;SURGICAL PAIN
TYPE: ACUTE PAIN
TYPE: ACUTE PAIN

## 2024-11-22 NOTE — OP REPORT
DATE OF OPERATION: 11/22/2024     PREOPERATIVE DIAGNOSIS: Right forefoot necrosis    POSTOPERATIVE DIAGNOSIS: Same    PROCEDURE PERFORMED: Right foot transmetatarsal amputation    SURGEON: Joe Moreau M.D.     ASSISTANT: None    ANESTHESIA: General    SPECIMEN: None    ESTIMATED BLOOD LOSS: 300 mL    IMPLANTS: None      INDICATIONS: The patient is a 68 y.o. male who presented with above.  I discussed the risks and benefits of the procedure which include but are not limited to risks of infection, wound healing complication, neurovascular injury, pain, malunion, non-union, malrotation, and the medical risks of anesthesia including MI, stroke, and death.  Alternatives to surgery were also discussed, including non-operative management, which I did not recommend.  The patient was in agreement with the plan to proceed, and the informed consent was signed and documented.  I met with the patient pre-operatively and marked the operative extremity with their agreement.  We proceeded to the operating room.     DESCRIPTION OF PROCEDURE:  Patient was seen in the preoperative holding area on the day of surgery. The operative site was marked with my initials.  he was taken to the operating room and placed supine on the operative table.  Anesthesia was induced.  The operative extremity was prepped and draped in the normal sterile fashion.  Operative pause was conducted and the correct patient, site, side, procedure, and surgeon's initials on extremity were identified.  Standard fishmouth incision was performed over the forefoot at the level of healthy appearing tissue.  This point time he had normal active bleeding.  Hemostasis was obtained using Bovie cautery and/or suture ties.  After we obtained hemostasis the wound was thoroughly irrigated sterile saline.  Wound was then closed in layered fashion.  Sterile dressings were applied.  Patient was awoken taken to PACU in stable condition.    POSTOPERATIVE PLAN:  heel weight  bearing in postop shoe.  Mobilize with physical and occupational therapies.  DVT prophylaxis with SCDs and Lovenox until mobilizing independently and then can be switched to aspirin for 4 weeks.  The patient will follow up in clinic in 2 weeks to check wounds and remove sutures/staples.      ____________________________________   Joe Moreau M.D.   DD: 11/22/2024  8:52 AM

## 2024-11-22 NOTE — PROGRESS NOTES
Pt left with transport via floor bed to pre-op and on 1L O2 NC. All belongings at bedside besides glasses.

## 2024-11-22 NOTE — PROGRESS NOTES
4 Eyes Skin Assessment Completed by Jonna RN and Maryellen RN.    Head WDL  Ears WDL  Nose WDL  Mouth WDL  Neck WDL  Breast/Chest WDL  Shoulder Blades WDL  Spine WDL  (R) Arm/Elbow/Hand Redness and Blanching  (L) Arm/Elbow/Hand Redness and Blanching  Abdomen WDL  Groin WDL  Scrotum/Coccyx/Buttocks Redness and Blanching  (R) Leg Incision with sx DIP  (L) Leg WDL  (R) Heel/Foot/Toe Discoloration necrotic 3rd to and heels red and blanching  (L) Heel/Foot/Toe Redness and Blanching          Devices In Places Blood Pressure Cuff, Pulse Ox, and Nasal Cannula      Interventions In Place NC W/Ear Foams, Heel Float Boots, Pillows, Barrier Cream, and Heels Loaded W/Pillows    Possible Skin Injury No    Pictures Uploaded Into Epic N/A  Wound Consult Placed N/A  RN Wound Prevention Protocol Ordered No

## 2024-11-22 NOTE — CARE PLAN
The patient is Stable - Low risk of patient condition declining or worsening    Shift Goals  Clinical Goals: NPO Sx, Wound Care, Pain Management  Patient Goals: Surgery  Family Goals: NA    Progress made toward(s) clinical / shift goals:      Problem: Knowledge Deficit - Standard  Goal: Patient and family/care givers will demonstrate understanding of plan of care, disease process/condition, diagnostic tests and medications  Outcome: Progressing  Education provided on plan of care this shift. Questions answered. Patient verbalizes understanding.      Problem: Pain - Standard  Goal: Alleviation of pain or a reduction in pain to the patient’s comfort goal  Outcome: Progressing     Problem: Wound/ / Incision Healing  Goal: Patient's wound/surgical incision will decrease in size and heals properly  Outcome: Progressing  Wound care provided per orders.      Problem: Bowel Elimination  Goal: Establish and maintain regular bowel function  Outcome: Progressing

## 2024-11-22 NOTE — PROGRESS NOTES
1057  Report received from Dixie PACU RN.    1120  Pt arrived to unit via floor bed. A&O x 4, pain 0/10, on 2L O2, dressing to R foot in place, with all belongings at bedside. Pt oriented to unit, call light and belongings within reach, educated to call for assistance. No family at bedside.   cervical neck hardware

## 2024-11-22 NOTE — ANESTHESIA POSTPROCEDURE EVALUATION
Patient: Arnulfo Crocker    Procedure Summary       Date: 11/22/24 Room / Location: [place] [address] / SURGERY [facility]    Anesthesia Start: 0906 Anesthesia Stop: 1026    Procedure: AMPUTATION, TOE - RIGHT TRANSMETATARSAL AMPUTATION (Right: Foot) Diagnosis: (right foot necrosis)    Surgeons: Joe Moreau M.D. Responsible Provider: Krystian Edward M.D.    Anesthesia Type: general ASA Status: 3            Final Anesthesia Type: general  Last vitals  BP   Blood Pressure : 93/52, Arterial BP: 123/47    Temp   37 °C (98.6 °F)    Pulse   93   Resp   20    SpO2   98 %      Anesthesia Post Evaluation    Patient location during evaluation: PACU  Patient participation: complete - patient participated  Level of consciousness: awake and alert  Pain score: 2    Airway patency: patent  Anesthetic complications: no  Cardiovascular status: hemodynamically stable  Respiratory status: acceptable  Hydration status: euvolemic    PONV: none          There were no known notable events for this encounter.     Nurse Pain Score: 2 (NPRS)

## 2024-11-22 NOTE — DISCHARGE PLANNING
Case Management Discharge Planning    Admission Date: 11/18/2024  GMLOS: 7.3  ALOS: 4    6-Clicks ADL Score: 23  6-Clicks Mobility Score: 22      Anticipated Discharge Dispo: Discharge Disposition: D/T to SNF with Medicare cert in anticipation of skilled care (03)    DME Needed: No    Action(s) Taken: Pt was discussed during IDT rounds. Pt will undergo toe amputation procedure today. PT/OT will see pt tomorrow. Per MD, pt will potentially stay over the weekend.    Escalations Completed: None    Medically Clear: No    Next Steps: LMSW to follow for any additional CM needs.    Barriers to Discharge: Medical clearance and Pending Procedures    Is the patient up for discharge tomorrow: No

## 2024-11-22 NOTE — ANESTHESIA PREPROCEDURE EVALUATION
Case: [id number] Date/Time: 11/22/24 0845    Procedure: AMPUTATION, TOE - RIGHT TRANSMETATARSAL AMPUTATION    Pre-op diagnosis: right foot necrosis    Location: [facility] OR 12 / SURGERY [facility]    Surgeons: Joe Moreau M.D.            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)       Physical Exam    Airway   Mallampati: II  TM distance: >3 FB  Neck ROM: full       Cardiovascular    Dental   (+) upper dentures, lower dentures        Facial Hair   Pulmonary   (+) decreased breath sounds     Abdominal    Neurological      Other findings: Patient had a CVA 2 years ago with mild residual right sided weakness.   Patient had a GA yesterday for a fem-POP bypass without any complications.              Anesthesia Plan    ASA 3   ASA physical status 3 criteria: COPD - poorly controlled and CVA or TIA - history (> 3 months)    Plan - general       Airway plan will be LMA          Induction: intravenous    Postoperative Plan: Postoperative administration of opioids is intended.    Pertinent diagnostic labs and testing reviewed    Informed Consent:    Anesthetic plan and risks discussed with patient.    Use of blood products discussed with: patient whom consented to blood products.

## 2024-11-22 NOTE — ANESTHESIA PROCEDURE NOTES
Airway    Date/Time: 11/22/2024 9:16 AM    Performed by: Krystian Edward M.D.  Authorized by: Krystian Edward M.D.    Location:  OR  Urgency:  Elective  Difficult Airway: No    Indications for Airway Management:  Anesthesia      Spontaneous Ventilation: absent    Sedation Level:  Deep  Preoxygenated: Yes    Patient Position:  Sniffing  Mask Difficulty Assessment:  1 - vent by mask  Final Airway Type:  Supraglottic airway  Final Supraglottic Airway:  Standard LMA    SGA Size:  5  Number of Attempts at Approach:  1

## 2024-11-22 NOTE — ANESTHESIA TIME REPORT
Anesthesia Start and Stop Event Times       Date Time Event    11/22/2024 0901 Ready for Procedure     0906 Anesthesia Start     1026 Anesthesia Stop          Responsible Staff  11/22/24      Name Role Begin End    Krystian Edward M.D. Anesth 0906 1026          Overtime Reason:  no overtime (within assigned shift)    Comments:

## 2024-11-22 NOTE — CARE PLAN
The patient is Stable - Low risk of patient condition declining or worsening    Shift Goals  Clinical Goals: Wound Care, Pain Management  Patient Goals: Comfort. prepare for birthday  Family Goals: Not Present    Progress made toward(s) clinical / shift goals:    Problem: Knowledge Deficit - Standard  Goal: Patient and family/care givers will demonstrate understanding of plan of care, disease process/condition, diagnostic tests and medications  Outcome: Progressing     Problem: Pain - Standard  Goal: Alleviation of pain or a reduction in pain to the patient’s comfort goal  Outcome: Progressing  Note: Paitient educated on 0 to 10 pain scale. Pain managed with pharmacologic and non-pharmacological interventions. See MAR. Pt verbilized understanding of interventions.      Problem: Infection  Goal: Will remain free from infection  Outcome: Progressing  Note: Pt educated on signs of infection. Pt verbalizes understanding. Dressing change(s) completed.     Problem: Safety  Goal: Will remain free from injury  Outcome: Progressing  Goal: Will remain free from falls  Outcome: Progressing     Problem: Hemodynamics  Goal: Patient's hemodynamics, fluid balance and neurologic status will be stable or improve  Outcome: Progressing     Problem: Wound/ / Incision Healing  Goal: Patient's wound/surgical incision will decrease in size and heals properly  Outcome: Progressing     Problem: Fluid Volume  Goal: Fluid volume balance will be maintained  Outcome: Progressing     Problem: Bowel Elimination  Goal: Establish and maintain regular bowel function  Outcome: Progressing       Patient is not progressing towards the following goals:

## 2024-11-22 NOTE — PROGRESS NOTES
XL post op shoe delivered to PACU RN. RN notified.    Contact traction for any questions or concerns regarding this DME.

## 2024-11-22 NOTE — CONSULTS
11/22/2024          HPI: Arnulfo Crocker is a 68 y.o. male who presents with right foot necrosis now postop day 1 status post revascularization procedure by Dr. Flores.  I was asked to take over care for amputation for necrotic toes.    Past Medical History:   Diagnosis Date    CVA (cerebral vascular accident) (HCC)     H/O mumps     High cholesterol     History of chickenpox     Hyperlipidemia     Hypertension        Past Surgical History:   Procedure Laterality Date    AORTOGRAM Bilateral 11/20/2024    Procedure: AORTOGRAM LOWER EXTREMITY, ANGIOGRAM, POSSIBLE INTERVENTION;  Surgeon: José Luis Flores M.D.;  Location: SURGERY [facility];  Service: General    ORIF, HAND  2013    Right hand        Medications  No current facility-administered medications on file prior to encounter.     Current Outpatient Medications on File Prior to Encounter   Medication Sig Dispense Refill    metoprolol tartrate (LOPRESSOR) 25 MG Tab Take 1/2 tablet by mouth twice a day 90 Tablet 0    lisinopril (PRINIVIL) 30 MG tablet Take 1 Tablet by mouth every day. 90 Tablet 3    atorvastatin (LIPITOR) 80 MG tablet Take 1 tablet by mouth every night 90 Tablet 3    aspirin 81 MG EC tablet Take 1 Tablet by mouth every day. 90 Tablet 3       Allergies  Patient has no known allergies.    ROS  . All other systems were reviewed and found to be negative    Family History   Problem Relation Age of Onset    Diabetes Mother     Breast Cancer Mother     Heart Disease Father     Cancer Father     Adjustment disorder with mixed anxiety and depressed mood Father     Hypertension Sister     Stroke Other        Social History     Socioeconomic History    Marital status: Single   Tobacco Use    Smoking status: Every Day     Current packs/day: 0.50     Average packs/day: 0.3 packs/day for 56.9 years (14.9 ttl pk-yrs)     Types: Cigarettes     Start date: 1968    Smokeless tobacco: Never   Vaping Use    Vaping status: Never Used   Substance and Sexual Activity     "Alcohol use: Yes     Alcohol/week: 12.6 oz     Types: 21 Cans of beer per week     Comment: 3 beers per day    Drug use: No    Sexual activity: Never     Social Drivers of Health     Food Insecurity: No Food Insecurity (11/19/2024)    Hunger Vital Sign     Worried About Running Out of Food in the Last Year: Never true     Ran Out of Food in the Last Year: Never true   Transportation Needs: No Transportation Needs (11/19/2024)    PRAPARE - Transportation     Lack of Transportation (Medical): No     Lack of Transportation (Non-Medical): No   Intimate Partner Violence: Not At Risk (11/19/2024)    Humiliation, Afraid, Rape, and Kick questionnaire     Fear of Current or Ex-Partner: No     Emotionally Abused: No     Physically Abused: No     Sexually Abused: No   Housing Stability: Low Risk  (11/19/2024)    Housing Stability Vital Sign     Unable to Pay for Housing in the Last Year: No     Number of Times Moved in the Last Year: 0     Homeless in the Last Year: No       Physical Exam  Vitals  /66   Pulse 84   Temp 36.4 °C (97.6 °F)   Resp 16   Ht 1.981 m (6' 6\")   Wt 82.5 kg (181 lb 14.1 oz)   SpO2 94%   General: Well Developed, Well Nourished, Age appropriate appearance  HEENT: Normocephalic, atraumatic  Psych: Normal mood and affect  Neck: Supple, nontender, no masses  Lungs: Breathing unlabored, No audible wheezing  Heart: Regular heart rate and rhythm  Abdomen: Soft, NT, ND  Neuro: Sensation grossly intact to BUE and BLE, moving all four extremities  Skin: Obviously necrotic toes of the right foot noted.  MSK: Right forefoot necrosis.        Radiographs:  DX-OPERATIVE ANGIOGRAM EACH PROJ   Final Result      Digitized intraoperative radiograph is submitted for review. This examination is not for diagnostic purpose but for guidance during a surgical procedure. Please see the patient's chart for full procedural details.         INTERPRETING LOCATION: [address] [place] [address], [id number]      DX-PORTABLE FLUORO > 1 " HOUR   Final Result      Portable fluoroscopy utilized for 48 seconds.      INTERPRETING LOCATION: [address] [facility], [place], [address]      CT-CTA LOWER EXT WITH & W/O-POST PROCESS RIGHT   Final Result         1.  Extensive bilateral atherosclerotic plaque.   2.  Occlusion of the right superficial femoral artery which reconstitutes distally. Greater than 70% stenosis of the distal right superficial femoral artery.   3.  Greater than 70% stenosis of the distal right external iliac artery,   4.  50-70% stenosis of the right tibioperoneal trunk with three-vessel runoff at the right ankle.   5.  Greater than 70% stenosis of the left distal external iliac artery.   6.  50-70% stenosis of the distal left superficial femoral artery   7.  50-70% stenosis of the left popliteal artery.   8.  Greater than 70% stenosis of the left tibioperoneal trunk with occlusion of the proximal posterior tibial artery. Small caliber left posterior tibial artery is seen distally otherwise there appears to be three-vessel runoff of the left ankle.   9.  Soft tissue wound of the right fourth and fifth toes with air in the fifth toe bony structures, compatible with osteomyelitis   10.  Soft tissue edema of the right leg below the knee.      These findings were discussed with the patient's clinician, Luiz Richardson, on 11/18/2024 10:07 PM.      DX-CHEST-PORTABLE (1 VIEW)   Final Result         1.  Right basilar atelectasis and/or subtle infiltrates.   2.  Trace right pleural effusion   3.  Right rib fractures are seen, appear likely subacute      IR-ABDOMINAL AORTA-WITH RUNOFF    (Results Pending)       Laboratory Values  Recent Labs     11/20/24  0054 11/21/24  0623 11/22/24  0019   WBC 9.6 9.6 9.7   RBC 3.93* 4.05* 3.93*   HEMOGLOBIN 12.7* 13.3* 12.6*   HEMATOCRIT 36.2* 37.8* 37.2*   MCV 92.1 93.3 94.7   MCH 32.3 32.8 32.1   MCHC 35.1 35.2 33.9   RDW 51.5* 51.7* 52.9*   PLATELETCT 244 238 223   MPV 9.0 9.1 9.1     Recent Labs     11/20/24  0054  11/21/24  0623 11/22/24  0019   SODIUM 131* 134* 136   POTASSIUM 3.5* 3.5* 3.8   CHLORIDE 99 102 101   CO2 21 21 24   GLUCOSE 110* 98 132*   BUN 10 11 12             Impression: 68-year-old male history of peripheral vascular disease status post recent revascularization procedure here with right foot necrosis.  Plan for transmetatarsal amputation today to provide him a good chance of wound healing.  We did discuss there is significant risks in the situation regarding wound healing and recurrent infections.    Plan:We discussed the diagnosis and findings with the patient at length.  We reviewed possible non operative and operative interventions and the risks and benefits of each of these.  he had a chance to ask questions and all of these were answered to his satisfaction. The patient chose to proceed with surgical intervention. Risks and benefits of surgery were discussed which include but are not limited to bleeding, infection, neurovascular damage, malunion, nonunion, instability, limb length discrepancy, DVT, PE, MI, Stroke and death. They understand these risks and wish to proceed.      Joe Moreau MD  Orthopedic Trauma Surgery

## 2024-11-22 NOTE — PROGRESS NOTES
4 Eyes Skin Assessment Completed by EZRA Coyle and EZRA Martinez.    Head WDL  Ears WDL  Nose WDL  Mouth WDL  Neck WDL  Breast/Chest WDL  Shoulder Blades WDL  Spine WDL  (R) Arm/Elbow/Hand Discoloration  (L) Arm/Elbow/Hand Discoloration  Abdomen WDL  Groin Incision prevena R groin and 4x4 with hypafix tape lower R groin  Scrotum/Coccyx/Buttocks WDL  (R) Leg Incision to inner thigh to back of kneewith hypafix and 4x4 in place  (L) Leg WDL  (R) Heel/Foot/Toe incision with ACE wrap in place  (L) Heel/Foot/Toe Redness and Blanching to heel          Devices In Places Blood Pressure Cuff, Pulse Ox, SCD's, Ortho Boot/Shoe, and Nasal Cannula      Interventions In Place NC W/Ear Foams, Heel Float Boots, TAP System, Pillows, and Barrier Cream    Possible Skin Injury No    Pictures Uploaded Into Epic N/A  Wound Consult Placed N/A  RN Wound Prevention Protocol Ordered No

## 2024-11-23 LAB
BACTERIA BLD CULT: NORMAL
SIGNIFICANT IND 70042: NORMAL
SITE SITE: NORMAL
SOURCE SOURCE: NORMAL

## 2024-11-23 PROCEDURE — 700102 HCHG RX REV CODE 250 W/ 637 OVERRIDE(OP)

## 2024-11-23 PROCEDURE — 700102 HCHG RX REV CODE 250 W/ 637 OVERRIDE(OP): Performed by: SURGERY

## 2024-11-23 PROCEDURE — 770001 HCHG ROOM/CARE - MED/SURG/GYN PRIV*

## 2024-11-23 PROCEDURE — 700105 HCHG RX REV CODE 258

## 2024-11-23 PROCEDURE — 700111 HCHG RX REV CODE 636 W/ 250 OVERRIDE (IP): Mod: JZ | Performed by: SURGERY

## 2024-11-23 PROCEDURE — A9270 NON-COVERED ITEM OR SERVICE: HCPCS

## 2024-11-23 PROCEDURE — 99232 SBSQ HOSP IP/OBS MODERATE 35: CPT | Performed by: INTERNAL MEDICINE

## 2024-11-23 PROCEDURE — 700111 HCHG RX REV CODE 636 W/ 250 OVERRIDE (IP): Mod: JZ

## 2024-11-23 PROCEDURE — A9270 NON-COVERED ITEM OR SERVICE: HCPCS | Performed by: SURGERY

## 2024-11-23 RX ADMIN — AMPICILLIN AND SULBACTAM 3 G: 1; 2 INJECTION, POWDER, FOR SOLUTION INTRAMUSCULAR; INTRAVENOUS at 04:47

## 2024-11-23 RX ADMIN — OXYCODONE 5 MG: 5 TABLET ORAL at 08:10

## 2024-11-23 RX ADMIN — AMPICILLIN AND SULBACTAM 3 G: 1; 2 INJECTION, POWDER, FOR SOLUTION INTRAMUSCULAR; INTRAVENOUS at 23:48

## 2024-11-23 RX ADMIN — ENOXAPARIN SODIUM 40 MG: 100 INJECTION SUBCUTANEOUS at 17:07

## 2024-11-23 RX ADMIN — ACETAMINOPHEN 650 MG: 325 TABLET ORAL at 23:45

## 2024-11-23 RX ADMIN — DOCUSATE SODIUM 100 MG: 100 CAPSULE, LIQUID FILLED ORAL at 17:07

## 2024-11-23 RX ADMIN — MAGNESIUM HYDROXIDE 30 ML: 1200 LIQUID ORAL at 17:08

## 2024-11-23 RX ADMIN — POLYETHYLENE GLYCOL 3350 1 PACKET: 17 POWDER, FOR SOLUTION ORAL at 17:07

## 2024-11-23 RX ADMIN — ASPIRIN 81 MG: 81 TABLET, COATED ORAL at 04:41

## 2024-11-23 RX ADMIN — SENNOSIDES AND DOCUSATE SODIUM 2 TABLET: 50; 8.6 TABLET ORAL at 17:07

## 2024-11-23 RX ADMIN — NICOTINE TRANSDERMAL SYSTEM 21 MG: 21 PATCH, EXTENDED RELEASE TRANSDERMAL at 04:40

## 2024-11-23 RX ADMIN — AMPICILLIN AND SULBACTAM 3 G: 1; 2 INJECTION, POWDER, FOR SOLUTION INTRAMUSCULAR; INTRAVENOUS at 17:10

## 2024-11-23 RX ADMIN — CLOPIDOGREL BISULFATE 75 MG: 75 TABLET ORAL at 04:41

## 2024-11-23 RX ADMIN — OXYCODONE 5 MG: 5 TABLET ORAL at 19:27

## 2024-11-23 RX ADMIN — ATORVASTATIN CALCIUM 80 MG: 40 TABLET, FILM COATED ORAL at 17:07

## 2024-11-23 RX ADMIN — LINEZOLID 600 MG: 600 TABLET, FILM COATED ORAL at 17:07

## 2024-11-23 RX ADMIN — AMPICILLIN AND SULBACTAM 3 G: 1; 2 INJECTION, POWDER, FOR SOLUTION INTRAMUSCULAR; INTRAVENOUS at 12:24

## 2024-11-23 RX ADMIN — METOPROLOL TARTRATE 12.5 MG: 25 TABLET, FILM COATED ORAL at 17:07

## 2024-11-23 RX ADMIN — LINEZOLID 600 MG: 600 TABLET, FILM COATED ORAL at 04:41

## 2024-11-23 RX ADMIN — AMPICILLIN AND SULBACTAM 3 G: 1; 2 INJECTION, POWDER, FOR SOLUTION INTRAMUSCULAR; INTRAVENOUS at 00:03

## 2024-11-23 ASSESSMENT — PAIN DESCRIPTION - PAIN TYPE
TYPE: ACUTE PAIN;SURGICAL PAIN
TYPE: ACUTE PAIN;SURGICAL PAIN
TYPE: SURGICAL PAIN

## 2024-11-23 NOTE — CARE PLAN
Problem: Knowledge Deficit - Standard  Goal: Patient and family/care givers will demonstrate understanding of plan of care, disease process/condition, diagnostic tests and medications  Outcome: Progressing     Problem: Pain - Standard  Goal: Alleviation of pain or a reduction in pain to the patient’s comfort goal  Outcome: Progressing     Problem: Safety  Goal: Will remain free from injury  Outcome: Progressing     Problem: Bowel Elimination  Goal: Establish and maintain regular bowel function  Outcome: Progressing    The patient is Stable - Low risk of patient condition declining or worsening    Shift Goals  Clinical Goals: LBM: 11/17/2024- bowel management, IV abx administration, Pain management, safety  Patient Goals: Administered bowel protocol medication, Administered IV abx, Pain control, comfort  Family Goals: Not present    Progress made toward(s) clinical / shift goals: Administered bowel protocol medication, advised and instructed to eat rich in fiber and increase oral fluid intake. Administered IV abx as ordered. Administered pain medication as ordered. Verbalized understanding towards the side effects of the medication provided. No new skin tear/ breakdown of the skin found. Placed bed in a lowest possible position, placed bed side table and call bell within reach, side rails up x2, kept safe and comfortably.

## 2024-11-23 NOTE — CARE PLAN
The patient is Stable - Low risk of patient condition declining or worsening    Shift Goals  Clinical Goals: pain control, comfort  Patient Goals: pain control, rest  Family Goals: NA    Progress made toward(s) clinical / shift goals:    Problem: Knowledge Deficit - Standard  Goal: Patient and family/care givers will demonstrate understanding of plan of care, disease process/condition, diagnostic tests and medications  Outcome: Progressing     Problem: Pain - Standard  Goal: Alleviation of pain or a reduction in pain to the patient’s comfort goal  Outcome: Progressing  Note: Paitient educated on 0 to 10 pain scale. Pain managed with pharmacologic and non-pharmacological interventions. See MAR. Pt verbilized understanding of interventions.      Problem: Infection  Goal: Will remain free from infection  Outcome: Progressing  Note: Pt educated on signs of infection. Pt verbalizes understanding. Dressing change(s) completed.     Problem: Safety  Goal: Will remain free from injury  Outcome: Progressing  Goal: Will remain free from falls  Outcome: Progressing     Problem: Hemodynamics  Goal: Patient's hemodynamics, fluid balance and neurologic status will be stable or improve  Outcome: Progressing     Problem: Wound/ / Incision Healing  Goal: Patient's wound/surgical incision will decrease in size and heals properly  Outcome: Progressing     Problem: Fluid Volume  Goal: Fluid volume balance will be maintained  Outcome: Progressing     Problem: Bowel Elimination  Goal: Establish and maintain regular bowel function  Outcome: Progressing       Patient is not progressing towards the following goals:

## 2024-11-23 NOTE — PROGRESS NOTES
"                 VASCULAR SURGERY               Inpatient Progress Note  _________________________________    Vitals (11/23/2024)  /63   Pulse 75   Temp 36.4 °C (97.5 °F) (Temporal)   Resp 15   Ht 1.981 m (6' 6\")   Wt 82.5 kg (181 lb 14.1 oz)   SpO2 91%   BMI 21.02 kg/m²   _________________________________    11/21/24 R FEA and R Fem-BKPop with irGSV (Sandra/YI)    11/22/24 R TMA (Prieto)    11/23/24  Stable o/n, pain controlled, bypass patent, right groin prevena CDI, right thigh/leg bandage CDI, right foot bandage CDI    - Doing well from surgical standpoint  - Will replace leg bandage tomorrow  - OK for SNF/IPR anytime from vascular standpoint  - FU 2-3 weeks for staple removal      Perry Zhang MD  [facility]  [place] preferred or call my office [phone]  __________________________________________________________________  Patient:Arnulfo Crocker   MRN:[id number]   CSN:[id number]    "

## 2024-11-23 NOTE — DISCHARGE PLANNING
Case Management Discharge Planning    Admission Date: 11/18/2024  GMLOS: 7.3  ALOS: 5    6-Clicks ADL Score: 23  6-Clicks Mobility Score: 22      Anticipated Discharge Dispo: Discharge Disposition: Discharged to home/self care (01)    DME Needed: No    Action(s) Taken: Discussed with Hospitalist, pt has been accepted to [facility] and [facility], pt is requesting he drive to the SNF as he reported being in debt from a previous ambulance ride. Requested DPA to see if pt can have his family drive him to SNF and to see if bed available for tomorrow. Awaiting to hear back from SNF.    Escalations Completed: None    Medically Clear: No    Next Steps: Pending SNF bed availability     Barriers to Discharge: None    Is the patient up for discharge tomorrow: Yes    Is transport arranged for discharge disposition: Yes

## 2024-11-23 NOTE — PROGRESS NOTES
Hospital Medicine Daily Progress Note    Date of Service  11/23/2024    Chief Complaint  Arnulfo Crocker is a 68 y.o. male admitted 11/18/2024 with right foot pain    Hospital Course  68-year-old male with past medical history of CVA, hypertension, and tobacco use who presents due to progressive wound of his right foot complicated by gangrene over the lateral aspect of the foot with necrosis of fourth and fifth digits. CT of the lower extremity with multiple areas of arterial stenosis. Vascular surgery was consulted. X-ray of the right foot demonstrating osteomyelitis.    Interval Problem Update  11/19 Patient noted discoloration of his right foot about 4 weeks ago. He has PAD and continues to smoke. CT reveals significant by bilateral PAD. Vascular surgery consulted.  I discussed the case with vascular surgery, plan for angiogram.  I consulted orthopedics who will evaluate the patient.  Continue IV abx for osteomyelitis.     11/20 Patient is planned for angiogram today for possible vascular intervention. Continue IV abx. Will consult ortho once vascular issues have been addressed.  K is low, added K replacement     11/21 Patient underwent Right common femoral and profunda femoral endarterectomy and  Right common femoral to below knee popliteal bypass using reversed right greater saphenous vein. Plan for transmetatarsal amputation with Ortho. Continue pain control with  oxycodone and IV dilaudid. Continue abx.       11/23 no acute events overnight.  His pain is well-controlled.  Heel weightbearing right lower extremity in shoe.  PT OT.  Patient will need SNF    I have discussed this patient's plan of care and discharge plan at IDT rounds today with Case Management, Nursing, Nursing leadership, and other members of the IDT team.    Consultants/Specialty  orthopedics and vascular surgery    Code Status  DNAR/DNI    Disposition  medically cleared for SNF  I have placed the appropriate orders for post-discharge  needs.    Review of Systems  ROS     Physical Exam  Temp:  [36.2 °C (97.2 °F)-36.5 °C (97.7 °F)] 36.4 °C (97.5 °F)  Pulse:  [67-88] 75  Resp:  [15-16] 15  BP: ()/(56-69) 105/63  SpO2:  [91 %-96 %] 91 %    Physical Exam  Vitals and nursing note reviewed.   Constitutional:       General: He is not in acute distress.  HENT:      Head: Normocephalic.      Mouth/Throat:      Mouth: Mucous membranes are moist.   Eyes:      Pupils: Pupils are equal, round, and reactive to light.   Cardiovascular:      Rate and Rhythm: Normal rate and regular rhythm.      Pulses: Normal pulses.      Heart sounds: Normal heart sounds.   Pulmonary:      Effort: Pulmonary effort is normal.      Breath sounds: Normal breath sounds.   Abdominal:      Palpations: Abdomen is soft.      Tenderness: There is no abdominal tenderness.   Musculoskeletal:         General: No swelling.      Cervical back: Neck supple.      Comments: Right foot status post TMA with dressings in place   Skin:     General: Skin is warm.      Coloration: Skin is not jaundiced.   Neurological:      General: No focal deficit present.      Mental Status: He is alert and oriented to person, place, and time.   Psychiatric:         Mood and Affect: Mood normal.         Behavior: Behavior normal.         Fluids    Intake/Output Summary (Last 24 hours) at 11/23/2024 1400  Last data filed at 11/23/2024 0450  Gross per 24 hour   Intake --   Output 600 ml   Net -600 ml        Laboratory  Recent Labs     11/21/24  0623 11/22/24  0019   WBC 9.6 9.7   RBC 4.05* 3.93*   HEMOGLOBIN 13.3* 12.6*   HEMATOCRIT 37.8* 37.2*   MCV 93.3 94.7   MCH 32.8 32.1   MCHC 35.2 33.9   RDW 51.7* 52.9*   PLATELETCT 238 223   MPV 9.1 9.1     Recent Labs     11/21/24  0623 11/22/24  0019   SODIUM 134* 136   POTASSIUM 3.5* 3.8   CHLORIDE 102 101   CO2 21 24   GLUCOSE 98 132*   BUN 11 12   CREATININE 0.69 0.71   CALCIUM 8.0* 7.7*                   Imaging  DX-OPERATIVE ANGIOGRAM EACH PROJ   Final Result       Digitized intraoperative radiograph is submitted for review. This examination is not for diagnostic purpose but for guidance during a surgical procedure. Please see the patient's chart for full procedural details.         INTERPRETING LOCATION: [address] , [place], [address]      DX-PORTABLE FLUORO > 1 HOUR   Final Result      Portable fluoroscopy utilized for 48 seconds.      INTERPRETING LOCATION: [address], [place], [address]      CT-CTA LOWER EXT WITH & W/O-POST PROCESS RIGHT   Final Result         1.  Extensive bilateral atherosclerotic plaque.   2.  Occlusion of the right superficial femoral artery which reconstitutes distally. Greater than 70% stenosis of the distal right superficial femoral artery.   3.  Greater than 70% stenosis of the distal right external iliac artery,   4.  50-70% stenosis of the right tibioperoneal trunk with three-vessel runoff at the right ankle.   5.  Greater than 70% stenosis of the left distal external iliac artery.   6.  50-70% stenosis of the distal left superficial femoral artery   7.  50-70% stenosis of the left popliteal artery.   8.  Greater than 70% stenosis of the left tibioperoneal trunk with occlusion of the proximal posterior tibial artery. Small caliber left posterior tibial artery is seen distally otherwise there appears to be three-vessel runoff of the left ankle.   9.  Soft tissue wound of the right fourth and fifth toes with air in the fifth toe bony structures, compatible with osteomyelitis   10.  Soft tissue edema of the right leg below the knee.      These findings were discussed with the patient's clinician, Luiz Richardson, on 11/18/2024 10:07 PM.      DX-CHEST-PORTABLE (1 VIEW)   Final Result         1.  Right basilar atelectasis and/or subtle infiltrates.   2.  Trace right pleural effusion   3.  Right rib fractures are seen, appear likely subacute      IR-ABDOMINAL AORTA-WITH RUNOFF    (Results Pending)        Assessment/Plan  * Osteomyelitis of Right 4th and  5th toe(HCC)- (present on admission)  Assessment & Plan  At [facility], x-ray presented osteomyelitis as well as some soft tissue gas on the right foot.  Ultrasound presented significant peripheral artery disease.  Received 1 dose of ceftriaxone and vancomycin.  Transferred to [facility] for vascular consult.  At [facility], CTA lower extremity presented extensive bilateral arthrosclerotic plaque and occlusion of right superficial femoral artery, greater then 70% stenosis of the distal right external iliac artery and multiple stenosis.  ED physician contacted vascular surgeon who is planning to evaluate tomorrow 11/19/2024.  Received 1 dose of metronidazole  -Admitted Med/Surg floor  -Unasyn and linezolid  -Follow-up blood and wound culture collected on 11/19-20/2024   Vascular surgery consulted,   Patient underwent Right common femoral and profunda femoral endarterectomy and  Right common femoral to below knee popliteal bypass using reversed right greater saphenous vein.   Status post transmetatarsal amputation with Ortho.   PT OT    Hyperlipidemia  Assessment & Plan  -Continue home atorvastatin    SIRS (systemic inflammatory response syndrome) (HCC)  Assessment & Plan  SIRS criteria identified on my evaluation include:  Tachycardia, with heart rate greater than 90 BPM and Leukocytosis, with WBC greater than 12,000  SIRS is non-infectious, the patient does not have sepsis  Elevated C-reactive, likely reactive leukocytosis secondary to osteomyelitis    Left-sided cerebrovascular accident (CVA) (HCC)- (present on admission)  Assessment & Plan  Left frontal lobe/insular infarct 7/2/2022  -Hold home aspirin due to possible amputation scheduled on 1119/24    Tobacco use disorder  Assessment & Plan  - Nicotine patch placed    Essential hypertension- (present on admission)  Assessment & Plan  -Continue home lisinopril and metoprolol tartrate         VTE prophylaxis: scd    I have performed a physical exam and reviewed  and updated ROS and Plan today (11/23/2024). In review of yesterday's note (11/22/2024), there are no changes except as documented above.

## 2024-11-23 NOTE — PROGRESS NOTES
Hospital Medicine Daily Progress Note    Date of Service  11/22/2024    Chief Complaint  Arnulfo Crocker is a 68 y.o. male admitted 11/18/2024 with right foot pain    Hospital Course  68-year-old male with past medical history of CVA, hypertension, and tobacco use who presents due to progressive wound of his right foot complicated by gangrene over the lateral aspect of the foot with necrosis of fourth and fifth digits. CT of the lower extremity with multiple areas of arterial stenosis. Vascular surgery was consulted. X-ray of the right foot demonstrating osteomyelitis.    Interval Problem Update  11/19 Patient noted discoloration of his right foot about 4 weeks ago. He has PAD and continues to smoke. CT reveals significant by bilateral PAD. Vascular surgery consulted.  I discussed the case with vascular surgery, plan for angiogram.  I consulted orthopedics who will evaluate the patient.  Continue IV abx for osteomyelitis.     11/20 Patient is planned for angiogram today for possible vascular intervention. Continue IV abx. Will consult ortho once vascular issues have been addressed.  K is low, added K replacement     11/21 Patient underwent Right common femoral and profunda femoral endarterectomy and  Right common femoral to below knee popliteal bypass using reversed right greater saphenous vein. Plan for transmetatarsal amputation with Ortho. Continue pain control with  oxycodone and IV dilaudid. Continue abx.     11/22 patient underwent right foot transmetatarsal amputation.  Pain control with oxycodone and IV morphine.  Encourage stool softeners to prevent constipation.  PT OT    I have discussed this patient's plan of care and discharge plan at IDT rounds today with Case Management, Nursing, Nursing leadership, and other members of the IDT team.    Consultants/Specialty  orthopedics and vascular surgery    Code Status  DNAR/DNI    Disposition  Not medically cleared   I have placed the appropriate orders for  post-discharge needs.    Review of Systems  ROS     Physical Exam  Temp:  [36.2 °C (97.2 °F)-36.5 °C (97.7 °F)] 36.4 °C (97.5 °F)  Pulse:  [67-88] 75  Resp:  [15-16] 15  BP: ()/(56-69) 105/63  SpO2:  [91 %-96 %] 91 %    Physical Exam  Vitals and nursing note reviewed.   Constitutional:       General: He is not in acute distress.  HENT:      Head: Normocephalic.      Mouth/Throat:      Mouth: Mucous membranes are moist.   Eyes:      Pupils: Pupils are equal, round, and reactive to light.   Cardiovascular:      Rate and Rhythm: Normal rate and regular rhythm.      Pulses: Normal pulses.      Heart sounds: Normal heart sounds.   Pulmonary:      Effort: Pulmonary effort is normal.      Breath sounds: Normal breath sounds.   Abdominal:      Palpations: Abdomen is soft.      Tenderness: There is no abdominal tenderness.   Musculoskeletal:         General: No swelling.      Cervical back: Neck supple.      Comments: Right foot s/p TMA with dressing in place    Skin:     General: Skin is warm.      Coloration: Skin is not jaundiced.   Neurological:      General: No focal deficit present.      Mental Status: He is alert and oriented to person, place, and time.   Psychiatric:         Mood and Affect: Mood normal.         Behavior: Behavior normal.         Fluids    Intake/Output Summary (Last 24 hours) at 11/23/2024 1402  Last data filed at 11/23/2024 0450  Gross per 24 hour   Intake --   Output 600 ml   Net -600 ml        Laboratory  Recent Labs     11/21/24  0623 11/22/24  0019   WBC 9.6 9.7   RBC 4.05* 3.93*   HEMOGLOBIN 13.3* 12.6*   HEMATOCRIT 37.8* 37.2*   MCV 93.3 94.7   MCH 32.8 32.1   MCHC 35.2 33.9   RDW 51.7* 52.9*   PLATELETCT 238 223   MPV 9.1 9.1     Recent Labs     11/21/24  0623 11/22/24  0019   SODIUM 134* 136   POTASSIUM 3.5* 3.8   CHLORIDE 102 101   CO2 21 24   GLUCOSE 98 132*   BUN 11 12   CREATININE 0.69 0.71   CALCIUM 8.0* 7.7*                   Imaging  DX-OPERATIVE ANGIOGRAM EACH PROJ   Final  Result      Digitized intraoperative radiograph is submitted for review. This examination is not for diagnostic purpose but for guidance during a surgical procedure. Please see the patient's chart for full procedural details.         INTERPRETING LOCATION: [address] , [place], [address]      DX-PORTABLE FLUORO > 1 HOUR   Final Result      Portable fluoroscopy utilized for 48 seconds.      INTERPRETING LOCATION: [address], [place], [address]      CT-CTA LOWER EXT WITH & W/O-POST PROCESS RIGHT   Final Result         1.  Extensive bilateral atherosclerotic plaque.   2.  Occlusion of the right superficial femoral artery which reconstitutes distally. Greater than 70% stenosis of the distal right superficial femoral artery.   3.  Greater than 70% stenosis of the distal right external iliac artery,   4.  50-70% stenosis of the right tibioperoneal trunk with three-vessel runoff at the right ankle.   5.  Greater than 70% stenosis of the left distal external iliac artery.   6.  50-70% stenosis of the distal left superficial femoral artery   7.  50-70% stenosis of the left popliteal artery.   8.  Greater than 70% stenosis of the left tibioperoneal trunk with occlusion of the proximal posterior tibial artery. Small caliber left posterior tibial artery is seen distally otherwise there appears to be three-vessel runoff of the left ankle.   9.  Soft tissue wound of the right fourth and fifth toes with air in the fifth toe bony structures, compatible with osteomyelitis   10.  Soft tissue edema of the right leg below the knee.      These findings were discussed with the patient's clinician, Luiz Richardson, on 11/18/2024 10:07 PM.      DX-CHEST-PORTABLE (1 VIEW)   Final Result         1.  Right basilar atelectasis and/or subtle infiltrates.   2.  Trace right pleural effusion   3.  Right rib fractures are seen, appear likely subacute      IR-ABDOMINAL AORTA-WITH RUNOFF    (Results Pending)        Assessment/Plan  * Osteomyelitis of Right  4th and 5th toe(HCC)- (present on admission)  Assessment & Plan  At [facility], x-ray presented osteomyelitis as well as some soft tissue gas on the right foot.  Ultrasound presented significant peripheral artery disease.  Received 1 dose of ceftriaxone and vancomycin.  Transferred to [facility] for vascular consult.  At [facility], CTA lower extremity presented extensive bilateral arthrosclerotic plaque and occlusion of right superficial femoral artery, greater then 70% stenosis of the distal right external iliac artery and multiple stenosis.  ED physician contacted vascular surgeon who is planning to evaluate tomorrow 11/19/2024.  Received 1 dose of metronidazole  -Admitted Med/Surg floor  -Unasyn and linezolid  -Follow-up blood and wound culture collected on 11/19-20/2024   Vascular surgery consulted,   Patient underwent Right common femoral and profunda femoral endarterectomy and  Right common femoral to below knee popliteal bypass using reversed right greater saphenous vein.   Status post transmetatarsal amputation with Ortho.   PT OT    Hyperlipidemia  Assessment & Plan  -Continue home atorvastatin    SIRS (systemic inflammatory response syndrome) (HCC)  Assessment & Plan  SIRS criteria identified on my evaluation include:  Tachycardia, with heart rate greater than 90 BPM and Leukocytosis, with WBC greater than 12,000  SIRS is non-infectious, the patient does not have sepsis  Elevated C-reactive, likely reactive leukocytosis secondary to osteomyelitis    Left-sided cerebrovascular accident (CVA) (HCC)- (present on admission)  Assessment & Plan  Left frontal lobe/insular infarct 7/2/2022  -Hold home aspirin due to possible amputation scheduled on 1119/24    Tobacco use disorder  Assessment & Plan  - Nicotine patch placed    Essential hypertension- (present on admission)  Assessment & Plan  -Continue home lisinopril and metoprolol tartrate         VTE prophylaxis: scd    I have performed a physical exam and  reviewed and updated ROS and Plan today (11/23/2024). In review of yesterday's note (11/22/2024), there are no changes except as documented above.    I spent 52 minutes, reviewing the chart, obtaining and/or reviewing separately obtained history. Performing a medically appropriate examination and evaluation.  Counseling and educating the patient. Ordering and reviewing medications, tests, or procedures.  Discussing the case with Ortho and Vascular surgery.  Documenting clinical information in EPIC. Independently interpreting results and communicating results to patient. Discussing future disposition of care with patient, RN and case management.

## 2024-11-23 NOTE — PROGRESS NOTES
"    Orthopedic PA Progress Note    Interval changes:  Patient doing well postop  R foot dressings are CDI  Heel WB RLE in shoe  No pending ortho procedures  Follow up with the [facility] trauma [facility] 3 weeks postop.  Cleared for DC from orthopedic standpoint pending therapy recs and medical optimization    ROS - Patient denies any new issues.  Denies any numbness or tingling. Pain controlled.    /64   Pulse 70   Temp 36.4 °C (97.5 °F) (Temporal)   Resp 16   Ht 1.981 m (6' 6\")   Wt 82.5 kg (181 lb 14.1 oz)   SpO2 91%     Patient seen and examined  No acute distress  Breathing non labored  RRR  RLE: Dressings CDI    Recent Labs     11/21/24  0623 11/22/24  0019   WBC 9.6 9.7   RBC 4.05* 3.93*   HEMOGLOBIN 13.3* 12.6*   HEMATOCRIT 37.8* 37.2*   MCV 93.3 94.7   MCH 32.8 32.1   MCHC 35.2 33.9   RDW 51.7* 52.9*   PLATELETCT 238 223   MPV 9.1 9.1       Active Hospital Problems    Diagnosis     SIRS (systemic inflammatory response syndrome) ([facility]) [R65.10]     Hyperlipidemia [E78.5]     Osteomyelitis of Right 4th and 5th toe([facility]) [M86.9]     Left-sided cerebrovascular accident (CVA) ([facility]) [I63.9]     Essential hypertension [I10]        DX-OPERATIVE ANGIOGRAM EACH PROJ   Final Result      Digitized intraoperative radiograph is submitted for review. This examination is not for diagnostic purpose but for guidance during a surgical procedure. Please see the patient's chart for full procedural details.         INTERPRETING LOCATION: [address] [place] [address], [id number]      DX-PORTABLE FLUORO > 1 HOUR   Final Result      Portable fluoroscopy utilized for 48 seconds.      INTERPRETING LOCATION: [address] [place] [address], [address]      CT-CTA LOWER EXT WITH & W/O-POST PROCESS RIGHT   Final Result         1.  Extensive bilateral atherosclerotic plaque.   2.  Occlusion of the right superficial femoral artery which reconstitutes distally. Greater than 70% stenosis of the distal right superficial femoral artery.   3.  Greater than 70% " stenosis of the distal right external iliac artery,   4.  50-70% stenosis of the right tibioperoneal trunk with three-vessel runoff at the right ankle.   5.  Greater than 70% stenosis of the left distal external iliac artery.   6.  50-70% stenosis of the distal left superficial femoral artery   7.  50-70% stenosis of the left popliteal artery.   8.  Greater than 70% stenosis of the left tibioperoneal trunk with occlusion of the proximal posterior tibial artery. Small caliber left posterior tibial artery is seen distally otherwise there appears to be three-vessel runoff of the left ankle.   9.  Soft tissue wound of the right fourth and fifth toes with air in the fifth toe bony structures, compatible with osteomyelitis   10.  Soft tissue edema of the right leg below the knee.      These findings were discussed with the patient's clinician, Luiz Richardson, on 11/18/2024 10:07 PM.      DX-CHEST-PORTABLE (1 VIEW)   Final Result         1.  Right basilar atelectasis and/or subtle infiltrates.   2.  Trace right pleural effusion   3.  Right rib fractures are seen, appear likely subacute      IR-ABDOMINAL AORTA-WITH RUNOFF    (Results Pending)       Assessment/Plan:  Patient doing well postop  R foot dressings are CDI  Heel WB RLE in shoe  No pending ortho procedures  Follow up with the [facility] trauma [facility] 3 weeks postop.  Cleared for DC from orthopedic standpoint pending therapy recs and medical optimization    POD#1 S/p  Right foot transmetatarsal amputation   Wt bearing status - Heel WB RLE  Future Procedures - None planned   Wound care/Drains - Dressings to be changed every other day by nursing. Or PRN for saturation starting POD#2  Sutures/Staples out- 14-21 days post operatively. Removal will completed by ortho [facility] unless transferred.  Inpatient DVT prophylaxis: ASA/Plavix  DVT Prophylaxis outpatient: ASA 81 mg PO BID x4 weeks  PT/OT-initiated  Antibiotics: unasyn; Perioperative completed  DVT Prophylaxis-  TEDS/SCDs/Foot pumps  Case Coordination for Discharge Planning - Disposition per therapy recs.

## 2024-11-23 NOTE — DISCHARGE PLANNING
0905  PAOLA left a VM with Karen @ [facility] [phone] to see if bed available for tomorrow/Called [facility]lett [phone] left a VM for Admissions for bed availability for tomorrow/Notified CM via Teams

## 2024-11-23 NOTE — PROGRESS NOTES
Right Small offloading shoe fit to pt per PA request. Pegs removed near area of concern and left at pt's bedside. XL post op shoe replaced with Small due to poor fit.    Contact traction for any questions or concerns.

## 2024-11-24 LAB
BACTERIA BLD CULT: NORMAL
SIGNIFICANT IND 70042: NORMAL
SITE SITE: NORMAL
SOURCE SOURCE: NORMAL

## 2024-11-24 PROCEDURE — 700102 HCHG RX REV CODE 250 W/ 637 OVERRIDE(OP): Performed by: SURGERY

## 2024-11-24 PROCEDURE — 97535 SELF CARE MNGMENT TRAINING: CPT

## 2024-11-24 PROCEDURE — 700105 HCHG RX REV CODE 258

## 2024-11-24 PROCEDURE — A9270 NON-COVERED ITEM OR SERVICE: HCPCS

## 2024-11-24 PROCEDURE — 700111 HCHG RX REV CODE 636 W/ 250 OVERRIDE (IP): Mod: JZ | Performed by: SURGERY

## 2024-11-24 PROCEDURE — 97166 OT EVAL MOD COMPLEX 45 MIN: CPT

## 2024-11-24 PROCEDURE — 99232 SBSQ HOSP IP/OBS MODERATE 35: CPT | Performed by: INTERNAL MEDICINE

## 2024-11-24 PROCEDURE — 700102 HCHG RX REV CODE 250 W/ 637 OVERRIDE(OP)

## 2024-11-24 PROCEDURE — 770001 HCHG ROOM/CARE - MED/SURG/GYN PRIV*

## 2024-11-24 PROCEDURE — 700111 HCHG RX REV CODE 636 W/ 250 OVERRIDE (IP): Mod: JZ

## 2024-11-24 PROCEDURE — A9270 NON-COVERED ITEM OR SERVICE: HCPCS | Performed by: SURGERY

## 2024-11-24 RX ADMIN — CLOPIDOGREL BISULFATE 75 MG: 75 TABLET ORAL at 04:50

## 2024-11-24 RX ADMIN — LINEZOLID 600 MG: 600 TABLET, FILM COATED ORAL at 16:32

## 2024-11-24 RX ADMIN — LISINOPRIL 30 MG: 10 TABLET ORAL at 04:50

## 2024-11-24 RX ADMIN — AMPICILLIN AND SULBACTAM 3 G: 1; 2 INJECTION, POWDER, FOR SOLUTION INTRAMUSCULAR; INTRAVENOUS at 11:52

## 2024-11-24 RX ADMIN — DOCUSATE SODIUM 100 MG: 100 CAPSULE, LIQUID FILLED ORAL at 04:50

## 2024-11-24 RX ADMIN — METOPROLOL TARTRATE 12.5 MG: 25 TABLET, FILM COATED ORAL at 04:49

## 2024-11-24 RX ADMIN — NICOTINE TRANSDERMAL SYSTEM 21 MG: 21 PATCH, EXTENDED RELEASE TRANSDERMAL at 04:49

## 2024-11-24 RX ADMIN — METOPROLOL TARTRATE 12.5 MG: 25 TABLET, FILM COATED ORAL at 16:32

## 2024-11-24 RX ADMIN — AMPICILLIN AND SULBACTAM 3 G: 1; 2 INJECTION, POWDER, FOR SOLUTION INTRAMUSCULAR; INTRAVENOUS at 16:30

## 2024-11-24 RX ADMIN — AMPICILLIN AND SULBACTAM 3 G: 1; 2 INJECTION, POWDER, FOR SOLUTION INTRAMUSCULAR; INTRAVENOUS at 04:57

## 2024-11-24 RX ADMIN — ATORVASTATIN CALCIUM 80 MG: 40 TABLET, FILM COATED ORAL at 16:32

## 2024-11-24 RX ADMIN — OXYCODONE 5 MG: 5 TABLET ORAL at 16:38

## 2024-11-24 RX ADMIN — ENOXAPARIN SODIUM 40 MG: 100 INJECTION SUBCUTANEOUS at 16:32

## 2024-11-24 RX ADMIN — ASPIRIN 81 MG: 81 TABLET, COATED ORAL at 04:50

## 2024-11-24 RX ADMIN — LINEZOLID 600 MG: 600 TABLET, FILM COATED ORAL at 04:50

## 2024-11-24 ASSESSMENT — COGNITIVE AND FUNCTIONAL STATUS - GENERAL
PERSONAL GROOMING: A LITTLE
DRESSING REGULAR LOWER BODY CLOTHING: A LOT
DRESSING REGULAR LOWER BODY CLOTHING: A LOT
TOILETING: A LOT
DRESSING REGULAR UPPER BODY CLOTHING: A LITTLE
HELP NEEDED FOR BATHING: A LOT
MOVING TO AND FROM BED TO CHAIR: A LITTLE
MOBILITY SCORE: 18
SUGGESTED CMS G CODE MODIFIER DAILY ACTIVITY: CK
TOILETING: A LOT
SUGGESTED CMS G CODE MODIFIER MOBILITY: CK
DAILY ACTIVITIY SCORE: 17
DAILY ACTIVITIY SCORE: 17
WALKING IN HOSPITAL ROOM: A LOT
STANDING UP FROM CHAIR USING ARMS: A LITTLE
HELP NEEDED FOR BATHING: A LOT
CLIMB 3 TO 5 STEPS WITH RAILING: A LOT
SUGGESTED CMS G CODE MODIFIER DAILY ACTIVITY: CK

## 2024-11-24 ASSESSMENT — ENCOUNTER SYMPTOMS
WHEEZING: 0
DIZZINESS: 0
MYALGIAS: 0
FEVER: 0
DIAPHORESIS: 0
SORE THROAT: 0
FOCAL WEAKNESS: 0
VOMITING: 0
CHILLS: 0
DIARRHEA: 0
NAUSEA: 0
ABDOMINAL PAIN: 0
BLOOD IN STOOL: 0
BACK PAIN: 0
SHORTNESS OF BREATH: 0
HEADACHES: 0
FLANK PAIN: 0
BRUISES/BLEEDS EASILY: 0
NECK PAIN: 0
COUGH: 0
SEIZURES: 0
SPUTUM PRODUCTION: 0
BLURRED VISION: 0
PALPITATIONS: 0

## 2024-11-24 ASSESSMENT — PAIN DESCRIPTION - PAIN TYPE
TYPE: ACUTE PAIN;SURGICAL PAIN

## 2024-11-24 ASSESSMENT — ACTIVITIES OF DAILY LIVING (ADL): TOILETING: INDEPENDENT

## 2024-11-24 NOTE — THERAPY
Occupational Therapy   Initial Evaluation     Patient Name: Arnulfo Crocker  Age:  69 y.o., Sex:  male  Medical Record #: [id number]  Today's Date: 11/24/2024    Precautions: Fall Risk, Heel Weight Bearing Right Lower Extremity, Offloading Shoe    Assessment  Patient is 69 y.o. male admitted with concern for right foot necrosis. CTA showed multilevel arterial occlusive disease and angiogram showed multilevel arterial occlusive disease. Seen POD #3  right common femoral endarterectomy, right external iliac artery stenting, righ femoral to below-knee popliteal bypass and POD #2 right foot transmetatarsal amputation. PMHx of CVA, HTN, hyperlipidemia, and tobacco use. Pt seen for OT eval and tx. Pt currently resides alone in a 1-story house and was independent with ADLs and IADLs.     During OT eval, pt presented with pain as well as deficits in self-care tasks, balance, functional mobility, strength, and activity tolerance. He required SBA-CGA to complete bed mobility, most ADLs, functional mobility, and txfs. He required max A to manage post-op shoe, but was able to tailor sit to manage L sock. Self endorsed feeling deconditioning as he has been primarily lying in bed since admission; edu on mobilizing to the chair 3x/day. He was provided additional education on role of acute OT, WB precaution, how to safely complete txfs, and compensatory strategies to safely complete ADLs. Currently recommend post-acute placement prior to DC home. Will continue to follow for ongoing acute OT services.     Plan    Occupational Therapy Initial Treatment Plan   Treatment Interventions: Self Care / Activities of Daily Living, Adaptive Equipment, Therapeutic Exercises, Therapeutic Activity  Treatment Frequency: 4 Times per Week  Duration: Until Therapy Goals Met    DC Equipment Recommendations: Tub / Shower Seat  Discharge Recommendations: Recommend post-acute placement for additional occupational therapy services prior to discharge home       Objective    Prior Living Situation   Prior Services Home-Independent   Housing / Facility 1 Story House   Steps Into Home   (Reports having steps, but stated that his friends are planning to build him a ramp prior to him returning home)   Bathroom Set up Walk In Shower;Grab Bars   Equipment Owned Front-Wheel Walker;4-Wheel Walker;Single Point Cane;Crutches;Grab Bar(s) In Tub / Shower;Hand Held Shower; Raised Toilet Seat with Arms   Lives with - Patient's Self Care Capacity Alone and Able to Care For Self   Comments Pt currently resides alone in [place], [place] but reports having friends who can provide assist if needed.   Prior Level of ADL Function   Self Feeding Independent   Grooming / Hygiene Independent   Bathing Independent   Dressing Independent   Toileting Independent   Prior Level of IADL Function   Medication Management Independent   Laundry Independent   Kitchen Mobility Independent   Finances Independent   Home Management Independent   Shopping Independent   Prior Level Of Mobility Independent Without Device in Community;Independent Without Device in Home   Driving / Transportation Driving Independent   Leisure Interests   (Restores cars and builds motorcycles)   History of Falls   History of Falls Yes   Date of Last Fall   (Reporting having 1 fall within the past year)   Vitals   O2 Delivery Device None - Room Air   Pain 0 - 10 Group   Therapist Pain Assessment Post Activity Pain Same as Prior to Activity;Nurse Notified  (Not rated, reported a mild increase in pain with activity)   Cognition    Cognition / Consciousness X   Speech/ Communication Delayed Responses   Level of Consciousness Alert   Comments Pleasant, cooperative, and receptive to education. Reports that due to his CVA, he feels like sometimes his brain does not connect with his body. He provided the example of when he has fallen in the past, his brain does not prompt him to activate his protective responses and then when he is on the  ground he cannot figure out how to get up. Stated that it does not  happen all of the time.   Active ROM Upper Body   Active ROM Upper Body  WDL   Dominant Hand Right   Comments Observed during functional tasks   Strength Upper Body   Upper Body Strength  X   Gross Strength Generalized Weakness, Equal Bilaterally.    Balance Assessment   Sitting Balance (Static) Fair +   Sitting Balance (Dynamic) Fair   Standing Balance (Static) Fair   Standing Balance (Dynamic) Fair -   Weight Shift Sitting Fair   Weight Shift Standing Fair   Comments w/FWW   Bed Mobility    Supine to Sit Standby Assist   Sit to Supine Standby Assist   Scooting Standby Assist   Comments HOB elevated   ADL Assessment   Eating Supervision   Grooming Contact Guard Assist;Standing   Upper Body Dressing Supervision   Lower Body Dressing Maximal Assist  (Don post-op shoe; able to tailor sit to don L sock)   Toileting   (NT; reported that he had completed it prior to start of session. Reported that he needed assist with pericare and completing STS from BSC over standard toilet)   How much help from another person does the patient currently need...   6 Clicks Daily Activity Score 17   Functional Mobility   Sit to Stand Contact Guard Assist  (From elevated height of bed due to 6'6'' stature)   Bed, Chair, Wheelchair Transfer Contact Guard Assist  (Cues to control descent)   Toilet Transfers   (NT; reported that he had completed it prior to start of session. Reported that he needed assist with pericare and completing STS from BSC over standard toilet)   Mobility Functional mobility in room; w/FWW   Activity Tolerance   Sitting Edge of Bed 5 min   Standing 5 min   Comments Self endorsed feeling deconditioning as he has been primarily lying in bed since admission; edu on mobilizing to the chair 3x/day   Patient / Family Goals   Patient / Family Goal #1 To go to rehab   Short Term Goals   Short Term Goal # 1 Pt will complete ADL txfs with supv   Short Term Goal  # 2 Pt will complete LB dressing (including post-op shoe) with supv using AE PRN   Short Term Goal # 3 Pt will complete toileting ADLs with supv   Short Term Goal # 4 Pt will complete standing g/h routine with supv   Education Group   Education Provided Home Safety;Role of Occupational Therapist;Transfers;Activities of Daily Living;Weight Bearing Precautions;Pathology of bedrest;Brace Wear and Care   Role of Occupational Therapist Patient Response Patient;Acceptance;Explanation;Verbal Demonstration   Brace Wear & Care Patient Response Patient;Acceptance;Explanation;Verbal Demonstration;Reinforcement Needed   Home Safety Patient Response Patient;Acceptance;Explanation;Reinforcement Needed   Transfers Patient Response Patient;Acceptance;Explanation;Reinforcement Needed   ADL Patient Response Patient;Acceptance;Explanation;Verbal Demonstration;Action Demonstration;Reinforcement Needed   Weight Bearing Precautions Patient Response Patient;Acceptance;Explanation;Demonstration;Verbal Demonstration;Action Demonstration   Pathology of Bedrest Patient Response Patient;Acceptance;Explanation;Reinforcement Needed

## 2024-11-24 NOTE — CARE PLAN
The patient is Stable - Low risk of patient condition declining or worsening    Shift Goals  Clinical Goals: IV abx, mobility  Patient Goals: BM  Family Goals: not present    Progress made toward(s) clinical / shift goals:  yes    Problem: Knowledge Deficit - Standard  Goal: Patient and family/care givers will demonstrate understanding of plan of care, disease process/condition, diagnostic tests and medications  Outcome: Progressing     Problem: Pain - Standard  Goal: Alleviation of pain or a reduction in pain to the patient’s comfort goal  Outcome: Progressing     Problem: Infection  Goal: Will remain free from infection  Outcome: Progressing     Problem: Safety  Goal: Will remain free from injury  Outcome: Progressing  Goal: Will remain free from falls  Outcome: Progressing     Problem: Hemodynamics  Goal: Patient's hemodynamics, fluid balance and neurologic status will be stable or improve  Outcome: Progressing     Problem: Wound/ / Incision Healing  Goal: Patient's wound/surgical incision will decrease in size and heals properly  Outcome: Progressing     Problem: Fluid Volume  Goal: Fluid volume balance will be maintained  Outcome: Progressing     Problem: Bowel Elimination  Goal: Establish and maintain regular bowel function  Outcome: Progressing     Problem: Fall Risk  Goal: Patient will remain free from falls  Outcome: Progressing

## 2024-11-24 NOTE — CARE PLAN
Problem: Knowledge Deficit - Standard  Goal: Patient and family/care givers will demonstrate understanding of plan of care, disease process/condition, diagnostic tests and medications  Outcome: Progressing     Problem: Pain - Standard  Goal: Alleviation of pain or a reduction in pain to the patient’s comfort goal  Outcome: Progressing     Problem: Safety  Goal: Will remain free from injury  Outcome: Progressing  Goal: Will remain free from falls  Outcome: Progressing     Problem: Wound/ / Incision Healing  Goal: Patient's wound/surgical incision will decrease in size and heals properly  Outcome: Progressing    The patient is Stable - Low risk of patient condition declining or worsening    Shift Goals  Clinical Goals: Pain management, safety  Patient Goals: Pain control, comfort  Family Goals: Not present    Progress made toward(s) clinical / shift goals: Administered IV abx as ordered. Administered pain medication as ordered. Verbalized understanding towards the side effects of the medication provided. No new skin tear/ breakdown of the skin found. Placed bed in a lowest possible position, placed bed side table and call bell within reach, side rails up x2, kept safe and comfortably

## 2024-11-24 NOTE — PROGRESS NOTES
Hospital Medicine Daily Progress Note    Date of Service  11/24/2024    Chief Complaint  Arnulfo Crocker is a 68 y.o. male admitted 11/18/2024 with right foot pain    Hospital Course  68-year-old male with past medical history of CVA, hypertension, and tobacco use who presents due to progressive wound of his right foot complicated by gangrene over the lateral aspect of the foot with necrosis of fourth and fifth digits. CT of the lower extremity with multiple areas of arterial stenosis. Vascular surgery was consulted. X-ray of the right foot demonstrating osteomyelitis.    Interval Problem Update  11/19 Patient noted discoloration of his right foot about 4 weeks ago. He has PAD and continues to smoke. CT reveals significant by bilateral PAD. Vascular surgery consulted.  I discussed the case with vascular surgery, plan for angiogram.  I consulted orthopedics who will evaluate the patient.  Continue IV abx for osteomyelitis.     11/20 Patient is planned for angiogram today for possible vascular intervention. Continue IV abx. Will consult ortho once vascular issues have been addressed.  K is low, added K replacement     11/21 Patient underwent Right common femoral and profunda femoral endarterectomy and  Right common femoral to below knee popliteal bypass using reversed right greater saphenous vein. Plan for transmetatarsal amputation with Ortho. Continue pain control with  oxycodone and IV dilaudid. Continue abx.     11/24 VSS. no acute events overnight.  His pain is well-controlled.  Heel weightbearing right lower extremity in shoe.  PT OT.  Patient will need SNF placement        I have discussed this patient's plan of care and discharge plan at IDT rounds today with Case Management, Nursing, Nursing leadership, and other members of the IDT team.    Consultants/Specialty  orthopedics and vascular surgery    Code Status  DNAR/DNI    Disposition  medically cleared for SNF  I have placed the appropriate orders for  post-discharge needs.    Review of Systems  Review of Systems   Constitutional:  Negative for chills, diaphoresis and fever.   HENT:  Negative for hearing loss and sore throat.    Eyes:  Negative for blurred vision.   Respiratory:  Negative for cough, sputum production, shortness of breath and wheezing.    Cardiovascular:  Negative for chest pain, palpitations and leg swelling.   Gastrointestinal:  Negative for abdominal pain, blood in stool, diarrhea, nausea and vomiting.   Genitourinary:  Negative for dysuria, flank pain and urgency.   Musculoskeletal:  Negative for back pain, joint pain, myalgias and neck pain.   Skin:  Negative for rash.   Neurological:  Negative for dizziness, focal weakness, seizures and headaches.   Endo/Heme/Allergies:  Does not bruise/bleed easily.   Psychiatric/Behavioral:  Negative for suicidal ideas.         Physical Exam  Temp:  [36.6 °C (97.9 °F)-37 °C (98.6 °F)] 37 °C (98.6 °F)  Pulse:  [69-86] 86  Resp:  [15-17] 17  BP: (100-136)/(56-68) 100/68  SpO2:  [90 %-95 %] 95 %    Physical Exam  Vitals and nursing note reviewed.   Constitutional:       General: He is not in acute distress.  HENT:      Head: Normocephalic.      Mouth/Throat:      Mouth: Mucous membranes are moist.   Eyes:      Pupils: Pupils are equal, round, and reactive to light.   Cardiovascular:      Rate and Rhythm: Normal rate and regular rhythm.      Pulses: Normal pulses.      Heart sounds: Normal heart sounds.   Pulmonary:      Effort: Pulmonary effort is normal.      Breath sounds: Normal breath sounds.   Abdominal:      Palpations: Abdomen is soft.      Tenderness: There is no abdominal tenderness.   Musculoskeletal:         General: No swelling.      Cervical back: Neck supple.      Comments: Right foot status post TMA with dressings in place   Skin:     General: Skin is warm.      Coloration: Skin is not jaundiced.   Neurological:      General: No focal deficit present.      Mental Status: He is alert and oriented  to person, place, and time.   Psychiatric:         Mood and Affect: Mood normal.         Behavior: Behavior normal.         Fluids    Intake/Output Summary (Last 24 hours) at 11/24/2024 1418  Last data filed at 11/24/2024 1152  Gross per 24 hour   Intake --   Output 250 ml   Net -250 ml        Laboratory  Recent Labs     11/22/24  0019   WBC 9.7   RBC 3.93*   HEMOGLOBIN 12.6*   HEMATOCRIT 37.2*   MCV 94.7   MCH 32.1   MCHC 33.9   RDW 52.9*   PLATELETCT 223   MPV 9.1     Recent Labs     11/22/24  0019   SODIUM 136   POTASSIUM 3.8   CHLORIDE 101   CO2 24   GLUCOSE 132*   BUN 12   CREATININE 0.71   CALCIUM 7.7*                   Imaging  DX-OPERATIVE ANGIOGRAM EACH PROJ   Final Result      Digitized intraoperative radiograph is submitted for review. This examination is not for diagnostic purpose but for guidance during a surgical procedure. Please see the patient's chart for full procedural details.         INTERPRETING LOCATION: [address] [place] [address], [id number]      DX-PORTABLE FLUORO > 1 HOUR   Final Result      Portable fluoroscopy utilized for 48 seconds.      INTERPRETING LOCATION: [address] [place] [address], [id number]      CT-CTA LOWER EXT WITH & W/O-POST PROCESS RIGHT   Final Result         1.  Extensive bilateral atherosclerotic plaque.   2.  Occlusion of the right superficial femoral artery which reconstitutes distally. Greater than 70% stenosis of the distal right superficial femoral artery.   3.  Greater than 70% stenosis of the distal right external iliac artery,   4.  50-70% stenosis of the right tibioperoneal trunk with three-vessel runoff at the right ankle.   5.  Greater than 70% stenosis of the left distal external iliac artery.   6.  50-70% stenosis of the distal left superficial femoral artery   7.  50-70% stenosis of the left popliteal artery.   8.  Greater than 70% stenosis of the left tibioperoneal trunk with occlusion of the proximal posterior tibial artery. Small caliber left posterior tibial artery is seen  distally otherwise there appears to be three-vessel runoff of the left ankle.   9.  Soft tissue wound of the right fourth and fifth toes with air in the fifth toe bony structures, compatible with osteomyelitis   10.  Soft tissue edema of the right leg below the knee.      These findings were discussed with the patient's clinician, Luiz Richardson, on 11/18/2024 10:07 PM.      DX-CHEST-PORTABLE (1 VIEW)   Final Result         1.  Right basilar atelectasis and/or subtle infiltrates.   2.  Trace right pleural effusion   3.  Right rib fractures are seen, appear likely subacute      IR-ABDOMINAL AORTA-WITH RUNOFF    (Results Pending)        Assessment/Plan  * Osteomyelitis of Right 4th and 5th toe(HCC)- (present on admission)  Assessment & Plan  At [facility], x-ray presented osteomyelitis as well as some soft tissue gas on the right foot.  Ultrasound presented significant peripheral artery disease.  Received 1 dose of ceftriaxone and vancomycin.  Transferred to [facility] for vascular consult.  At [facility], CTA lower extremity presented extensive bilateral arthrosclerotic plaque and occlusion of right superficial femoral artery, greater then 70% stenosis of the distal right external iliac artery and multiple stenosis.  ED physician contacted vascular surgeon who is planning to evaluate tomorrow 11/19/2024.  Received 1 dose of metronidazole  -Admitted Med/Surg floor  -Unasyn and linezolid  -Follow-up blood and wound culture collected on 11/19-20/2024   Vascular surgery consulted,   Patient underwent Right common femoral and profunda femoral endarterectomy and  Right common femoral to below knee popliteal bypass using reversed right greater saphenous vein.   Status post transmetatarsal amputation with Ortho.   PT OT    Hyperlipidemia  Assessment & Plan  -Continue home atorvastatin    SIRS (systemic inflammatory response syndrome) (HCC)  Assessment & Plan  SIRS criteria identified on my evaluation include:   Tachycardia, with heart rate greater than 90 BPM and Leukocytosis, with WBC greater than 12,000  SIRS is non-infectious, the patient does not have sepsis  Elevated C-reactive, likely reactive leukocytosis secondary to osteomyelitis    Left-sided cerebrovascular accident (CVA) (HCC)- (present on admission)  Assessment & Plan  Left frontal lobe/insular infarct 7/2/2022  -Hold home aspirin due to possible amputation scheduled on 1119/24    Tobacco use disorder  Assessment & Plan  - Nicotine patch placed    Essential hypertension- (present on admission)  Assessment & Plan  -Continue home lisinopril and metoprolol tartrate         VTE prophylaxis: scd    I have performed a physical exam and reviewed and updated ROS and Plan today (11/24/2024). In review of yesterday's note (11/23/2024), there are no changes except as documented above.

## 2024-11-24 NOTE — PROGRESS NOTES
"    Orthopedic PA Progress Note    Interval changes:  Patient doing well. Mobilizing better  R foot dressings are CDI  Heel WB RLE in offloading shoe  Follow up with the [facility] 3 weeks postop.  Cleared for DC from orthopedic standpoint     ROS - Patient denies any new issues.  Denies any numbness or tingling. Pain controlled.    /68   Pulse 86   Temp 37 °C (98.6 °F) (Temporal)   Resp 17   Ht 1.981 m (6' 6\")   Wt 82.5 kg (181 lb 14.1 oz)   SpO2 95%     Patient seen and examined  No acute distress  Breathing non labored  RRR  RLE: Dressings CDI    Recent Labs     11/22/24  0019   WBC 9.7   RBC 3.93*   HEMOGLOBIN 12.6*   HEMATOCRIT 37.2*   MCV 94.7   MCH 32.1   MCHC 33.9   RDW 52.9*   PLATELETCT 223   MPV 9.1       Active Hospital Problems    Diagnosis     SIRS (systemic inflammatory response syndrome) ([facility]) [R65.10]     Hyperlipidemia [E78.5]     Osteomyelitis of Right 4th and 5th toe([facility]) [M86.9]     Left-sided cerebrovascular accident (CVA) ([facility]) [I63.9]     Essential hypertension [I10]        DX-OPERATIVE ANGIOGRAM EACH PROJ   Final Result      Digitized intraoperative radiograph is submitted for review. This examination is not for diagnostic purpose but for guidance during a surgical procedure. Please see the patient's chart for full procedural details.         INTERPRETING LOCATION: [address] [place] [address], [id number]      DX-PORTABLE FLUORO > 1 HOUR   Final Result      Portable fluoroscopy utilized for 48 seconds.      INTERPRETING LOCATION: [address] [place] [address], [id number]      CT-CTA LOWER EXT WITH & W/O-POST PROCESS RIGHT   Final Result         1.  Extensive bilateral atherosclerotic plaque.   2.  Occlusion of the right superficial femoral artery which reconstitutes distally. Greater than 70% stenosis of the distal right superficial femoral artery.   3.  Greater than 70% stenosis of the distal right external iliac artery,   4.  50-70% stenosis of the right tibioperoneal trunk with three-vessel " runoff at the right ankle.   5.  Greater than 70% stenosis of the left distal external iliac artery.   6.  50-70% stenosis of the distal left superficial femoral artery   7.  50-70% stenosis of the left popliteal artery.   8.  Greater than 70% stenosis of the left tibioperoneal trunk with occlusion of the proximal posterior tibial artery. Small caliber left posterior tibial artery is seen distally otherwise there appears to be three-vessel runoff of the left ankle.   9.  Soft tissue wound of the right fourth and fifth toes with air in the fifth toe bony structures, compatible with osteomyelitis   10.  Soft tissue edema of the right leg below the knee.      These findings were discussed with the patient's clinician, Luiz Richardson, on 11/18/2024 10:07 PM.      DX-CHEST-PORTABLE (1 VIEW)   Final Result         1.  Right basilar atelectasis and/or subtle infiltrates.   2.  Trace right pleural effusion   3.  Right rib fractures are seen, appear likely subacute      IR-ABDOMINAL AORTA-WITH RUNOFF    (Results Pending)       Assessment/Plan:  Patient doing well. Mobilizing better  R foot dressings are CDI  Heel WB RLE in offloading shoe  Follow up with the [facility] trauma [facility] 3 weeks postop.  Cleared for DC from orthopedic standpoint     POD#2 S/p  Right foot transmetatarsal amputation   Wt bearing status - Heel WB RLE  Future Procedures - None planned   Wound care/Drains - Dressings to be changed every other day by nursing. Or PRN for saturation starting POD#2  Sutures/Staples out- 14-21 days post operatively. Removal will completed by ortho [facility] unless transferred.  Inpatient DVT prophylaxis: ASA/Plavix  DVT Prophylaxis outpatient: ASA 81 mg PO BID x4 weeks  PT/OT-initiated  Antibiotics: unasyn; Perioperative completed  DVT Prophylaxis- TEDS/SCDs/Foot pumps  Case Coordination for Discharge Planning - Disposition per therapy recs.

## 2024-11-25 LAB
ANION GAP SERPL CALC-SCNC: 9 MMOL/L (ref 7–16)
BUN SERPL-MCNC: 11 MG/DL (ref 8–22)
CALCIUM SERPL-MCNC: 7.4 MG/DL (ref 8.5–10.5)
CHLORIDE SERPL-SCNC: 101 MMOL/L (ref 96–112)
CO2 SERPL-SCNC: 24 MMOL/L (ref 20–33)
CREAT SERPL-MCNC: 0.59 MG/DL (ref 0.5–1.4)
ERYTHROCYTE [DISTWIDTH] IN BLOOD BY AUTOMATED COUNT: 51 FL (ref 35.9–50)
GFR SERPLBLD CREATININE-BSD FMLA CKD-EPI: 104 ML/MIN/1.73 M 2
GLUCOSE SERPL-MCNC: 109 MG/DL (ref 65–99)
HCT VFR BLD AUTO: 27.7 % (ref 42–52)
HGB BLD-MCNC: 9.6 G/DL (ref 14–18)
MCH RBC QN AUTO: 32 PG (ref 27–33)
MCHC RBC AUTO-ENTMCNC: 34.7 G/DL (ref 32.3–36.5)
MCV RBC AUTO: 92.3 FL (ref 81.4–97.8)
PLATELET # BLD AUTO: 214 K/UL (ref 164–446)
PMV BLD AUTO: 9.4 FL (ref 9–12.9)
POTASSIUM SERPL-SCNC: 3.2 MMOL/L (ref 3.6–5.5)
RBC # BLD AUTO: 3 M/UL (ref 4.7–6.1)
SODIUM SERPL-SCNC: 134 MMOL/L (ref 135–145)
WBC # BLD AUTO: 7.6 K/UL (ref 4.8–10.8)

## 2024-11-25 PROCEDURE — A9270 NON-COVERED ITEM OR SERVICE: HCPCS | Mod: JZ | Performed by: INTERNAL MEDICINE

## 2024-11-25 PROCEDURE — 80048 BASIC METABOLIC PNL TOTAL CA: CPT

## 2024-11-25 PROCEDURE — 99232 SBSQ HOSP IP/OBS MODERATE 35: CPT | Performed by: INTERNAL MEDICINE

## 2024-11-25 PROCEDURE — 770001 HCHG ROOM/CARE - MED/SURG/GYN PRIV*

## 2024-11-25 PROCEDURE — 85027 COMPLETE CBC AUTOMATED: CPT

## 2024-11-25 PROCEDURE — A9270 NON-COVERED ITEM OR SERVICE: HCPCS

## 2024-11-25 PROCEDURE — 36415 COLL VENOUS BLD VENIPUNCTURE: CPT

## 2024-11-25 PROCEDURE — 700111 HCHG RX REV CODE 636 W/ 250 OVERRIDE (IP): Mod: JZ | Performed by: SURGERY

## 2024-11-25 PROCEDURE — 700105 HCHG RX REV CODE 258

## 2024-11-25 PROCEDURE — 700102 HCHG RX REV CODE 250 W/ 637 OVERRIDE(OP): Mod: JZ | Performed by: INTERNAL MEDICINE

## 2024-11-25 PROCEDURE — A9270 NON-COVERED ITEM OR SERVICE: HCPCS | Performed by: SURGERY

## 2024-11-25 PROCEDURE — 700102 HCHG RX REV CODE 250 W/ 637 OVERRIDE(OP)

## 2024-11-25 PROCEDURE — 700102 HCHG RX REV CODE 250 W/ 637 OVERRIDE(OP): Performed by: SURGERY

## 2024-11-25 PROCEDURE — 97162 PT EVAL MOD COMPLEX 30 MIN: CPT

## 2024-11-25 PROCEDURE — 700111 HCHG RX REV CODE 636 W/ 250 OVERRIDE (IP): Mod: JZ

## 2024-11-25 RX ORDER — POTASSIUM CHLORIDE 1500 MG/1
40 TABLET, EXTENDED RELEASE ORAL ONCE
Status: COMPLETED | OUTPATIENT
Start: 2024-11-25 | End: 2024-11-25

## 2024-11-25 RX ADMIN — ENOXAPARIN SODIUM 40 MG: 100 INJECTION SUBCUTANEOUS at 16:06

## 2024-11-25 RX ADMIN — CLOPIDOGREL BISULFATE 75 MG: 75 TABLET ORAL at 04:53

## 2024-11-25 RX ADMIN — AMPICILLIN AND SULBACTAM 3 G: 1; 2 INJECTION, POWDER, FOR SOLUTION INTRAMUSCULAR; INTRAVENOUS at 04:59

## 2024-11-25 RX ADMIN — AMPICILLIN AND SULBACTAM 3 G: 1; 2 INJECTION, POWDER, FOR SOLUTION INTRAMUSCULAR; INTRAVENOUS at 11:26

## 2024-11-25 RX ADMIN — POTASSIUM CHLORIDE 40 MEQ: 1500 TABLET, EXTENDED RELEASE ORAL at 09:54

## 2024-11-25 RX ADMIN — AMPICILLIN AND SULBACTAM 3 G: 1; 2 INJECTION, POWDER, FOR SOLUTION INTRAMUSCULAR; INTRAVENOUS at 00:21

## 2024-11-25 RX ADMIN — LINEZOLID 600 MG: 600 TABLET, FILM COATED ORAL at 04:55

## 2024-11-25 RX ADMIN — ASPIRIN 81 MG: 81 TABLET, COATED ORAL at 04:55

## 2024-11-25 RX ADMIN — OXYCODONE 5 MG: 5 TABLET ORAL at 16:47

## 2024-11-25 RX ADMIN — AMPICILLIN AND SULBACTAM 3 G: 1; 2 INJECTION, POWDER, FOR SOLUTION INTRAMUSCULAR; INTRAVENOUS at 16:13

## 2024-11-25 RX ADMIN — METOPROLOL TARTRATE 12.5 MG: 25 TABLET, FILM COATED ORAL at 04:55

## 2024-11-25 RX ADMIN — LISINOPRIL 30 MG: 10 TABLET ORAL at 04:54

## 2024-11-25 RX ADMIN — ATORVASTATIN CALCIUM 80 MG: 40 TABLET, FILM COATED ORAL at 16:06

## 2024-11-25 RX ADMIN — NICOTINE TRANSDERMAL SYSTEM 21 MG: 21 PATCH, EXTENDED RELEASE TRANSDERMAL at 04:53

## 2024-11-25 RX ADMIN — METOPROLOL TARTRATE 12.5 MG: 25 TABLET, FILM COATED ORAL at 16:06

## 2024-11-25 RX ADMIN — LINEZOLID 600 MG: 600 TABLET, FILM COATED ORAL at 16:07

## 2024-11-25 ASSESSMENT — ENCOUNTER SYMPTOMS
NECK PAIN: 0
ABDOMINAL PAIN: 0
DIARRHEA: 0
BACK PAIN: 0
PALPITATIONS: 0
SORE THROAT: 0
BRUISES/BLEEDS EASILY: 0
CHILLS: 0
SHORTNESS OF BREATH: 0
WHEEZING: 0
FEVER: 0
BLOOD IN STOOL: 0
NAUSEA: 0
DIAPHORESIS: 0
FOCAL WEAKNESS: 0
SPUTUM PRODUCTION: 0
DIZZINESS: 0
COUGH: 0
VOMITING: 0
SEIZURES: 0
HEADACHES: 0
FLANK PAIN: 0
MYALGIAS: 0
BLURRED VISION: 0

## 2024-11-25 ASSESSMENT — GAIT ASSESSMENTS
GAIT LEVEL OF ASSIST: MINIMAL ASSIST
DISTANCE (FEET): 50
DEVIATION: STEP TO;ANTALGIC
ASSISTIVE DEVICE: FRONT WHEEL WALKER

## 2024-11-25 ASSESSMENT — COGNITIVE AND FUNCTIONAL STATUS - GENERAL
WALKING IN HOSPITAL ROOM: A LITTLE
STANDING UP FROM CHAIR USING ARMS: A LITTLE
MOBILITY SCORE: 17
MOVING FROM LYING ON BACK TO SITTING ON SIDE OF FLAT BED: A LITTLE
TURNING FROM BACK TO SIDE WHILE IN FLAT BAD: A LITTLE
SUGGESTED CMS G CODE MODIFIER MOBILITY: CK
CLIMB 3 TO 5 STEPS WITH RAILING: A LOT
MOVING TO AND FROM BED TO CHAIR: A LITTLE

## 2024-11-25 ASSESSMENT — PAIN DESCRIPTION - PAIN TYPE
TYPE: ACUTE PAIN;SURGICAL PAIN

## 2024-11-25 NOTE — CARE PLAN
The patient is Stable - Low risk of patient condition declining or worsening    Shift Goals  Clinical Goals: iv antibitocs, mobility  Patient Goals: rest  Family Goals: NA    Progress made toward(s) clinical / shift goals:      Problem: Knowledge Deficit - Standard  Goal: Patient and family/care givers will demonstrate understanding of plan of care, disease process/condition, diagnostic tests and medications  Description: Target End Date:  1-3 days or as soon as patient condition allows    Document in Patient Education    1.  Patient and family/caregiver oriented to unit, equipment, visitation policy and means for communicating concern  2.  Complete/review Learning Assessment  3.  Assess knowledge level of disease process/condition, treatment plan, diagnostic tests and medications  4.  Explain disease process/condition, treatment plan, diagnostic tests and medications  Outcome: Progressing     Problem: Pain - Standard  Goal: Alleviation of pain or a reduction in pain to the patient’s comfort goal  Description: Target End Date:  Prior to discharge or change in level of care    Document on Vitals flowsheet    1.  Document pain using the appropriate pain scale per order or unit policy  2.  Educate and implement non-pharmacologic comfort measures (i.e. relaxation, distraction, massage, cold/heat therapy, etc.)  3.  Pain management medications as ordered  4.  Reassess pain after pain med administration per policy  5.  If opiods administered assess patient's response to pain medication is appropriate per POSS sedation scale  6.  Follow pain management plan developed in collaboration with patient and interdisciplinary team (including palliative care or pain specialists if applicable)  Outcome: Progressing     Problem: Infection  Goal: Will remain free from infection  Outcome: Progressing     Problem: Safety  Goal: Will remain free from injury  Outcome: Progressing  Goal: Will remain free from falls  Outcome: Progressing      Problem: Fall Risk  Goal: Patient will remain free from falls  Description: Target End Date:  Prior to discharge or change in level of care    Document interventions on the Sand[place] [employer]gnio Fall Risk Assessment    1.  Assess for fall risk factors  2.  Implement fall precautions  Outcome: Progressing     Problem: Bowel Elimination  Goal: Establish and maintain regular bowel function  Description: Target End Date:  Prior to discharge or change in level of care    1.   Note date of last BM  2.   Educate about diet, fluid intake, medication and activity to promote bowel function  3.   Educate signs and symptoms of constipation and interventions to implement  4.   Pharmacologic bowel management per provider order  5.   Regular toileting schedule  6.   Upright position for toileting  7.   High fiber diet  8.   Encourage hydration  9.   Collaborate with Clinical Dietician  10. Care and maintenance of ostomy if applicable  Outcome: Progressing       Patient is not progressing towards the following goals:

## 2024-11-25 NOTE — CARE PLAN
The patient is Stable - Low risk of patient condition declining or worsening    Shift Goals  Clinical Goals: IV abx, mobility  Patient Goals: comfort  Family Goals: not present    Progress made toward(s) clinical / shift goals:  yes    Problem: Knowledge Deficit - Standard  Goal: Patient and family/care givers will demonstrate understanding of plan of care, disease process/condition, diagnostic tests and medications  Outcome: Progressing     Problem: Pain - Standard  Goal: Alleviation of pain or a reduction in pain to the patient’s comfort goal  Outcome: Progressing     Problem: Infection  Goal: Will remain free from infection  Outcome: Progressing     Problem: Safety  Goal: Will remain free from injury  Outcome: Progressing  Goal: Will remain free from falls  Outcome: Progressing     Problem: Hemodynamics  Goal: Patient's hemodynamics, fluid balance and neurologic status will be stable or improve  Outcome: Progressing     Problem: Wound/ / Incision Healing  Goal: Patient's wound/surgical incision will decrease in size and heals properly  Outcome: Progressing     Problem: Fluid Volume  Goal: Fluid volume balance will be maintained  Outcome: Progressing     Problem: Bowel Elimination  Goal: Establish and maintain regular bowel function  Outcome: Progressing     Problem: Fall Risk  Goal: Patient will remain free from falls  Outcome: Progressing

## 2024-11-25 NOTE — THERAPY
Physical Therapy   Initial Evaluation     Patient Name: Arnulfo Crocker  Age:  69 y.o., Sex:  male  Medical Record #: [id number]  Today's Date: 11/25/2024     Precautions  Precautions: Fall Risk;Heel Weight Bearing Right Lower Extremity;Offloading Shoe    Assessment  Patient is 69 y.o. male admitted with R 4th & 5th toe gangrene & osteomyelitis, significant PAD, and SIRS. Pt now s/p R common femoral and profunda femoral endarterectomy with femoral to popliteal bypass on 11/21 and s/p R transmetatarsal amputation on 11/22.  PMH includes but is not limited to: L CVA in 2022, HLD, tobacco use, Dupuytren contracture, COPD.  Pt presented for PT evaluation with balance deficits, functional weakness, kyphotic posture, decreased activity tolerance & overall functional mobility.  Pt mobilized with CGA-min A for upright mobility as detailed below with loss of eccentric control back to sitting x 2 with initial STS attempts. Pt would benefit from daily mobility with nursing staff including mobilizing to toilet/BSC as needed, sitting up in chair 3x/day for meals, and ambulating daily.  Will continue to follow for skilled therapy, recommend post acute placement.     Plan    Physical Therapy Initial Treatment Plan   Treatment Plan : Bed Mobility, Equipment, Gait Training, Neuro Re-Education / Balance, Self Care / Home Evaluation, Stair Training, Therapeutic Activities, Therapeutic Exercise, Family / Caregiver Training  Treatment Frequency: 4 Times per Week  Duration: Until Therapy Goals Met    DC Equipment Recommendations: Unable to determine at this time  Discharge Recommendations: Recommend post-acute placement for additional physical therapy services prior to discharge home     Objective     11/25/24 0834   Precautions   Precautions Fall Risk;Heel Weight Bearing Right Lower Extremity;Offloading Shoe   Pain 0 - 10 Group   Therapist Pain Assessment Post Activity Pain Same as Prior to Activity;Nurse Notified (Not quantified)   Prior  "Living Situation   Prior Services Home-Independent   Housing / Facility 1 Story House   Steps Into Home (stated his friends are planning to build a ramp before he comes home)   Equipment Owned Front-Wheel Walker;4-Wheel Walker;Single Point Cane;Crutches   Lives with - Patient's Self Care Capacity Alone and Able to Care For Self   Comments Stated his friends are able to assist as needed.   Prior Level of Functional Mobility   Bed Mobility Independent   Transfer Status Independent   Ambulation Independent   Assistive Devices Used None   History of Falls   History of Falls Yes   Cognition    Cognition / Consciousness X   Level of Consciousness Alert   Comments Pleasant & cooperative, receptive to education   Active ROM Lower Body    Active ROM Lower Body  X   Comments BLE WFL   Strength Lower Body   Lower Body Strength  X   Comments BLE WFL, L ankle NT due to sx   Sensation Lower Body   Lower Extremity Sensation   WDL   Comments Denied N/T   Other Treatments   Other Treatments Provided Noted pt also has open-toed post op shoe, stated MD told him to start using that \"after rehab\".   Balance Assessment   Sitting Balance (Static) Fair +   Sitting Balance (Dynamic) Fair   Standing Balance (Static) Fair   Standing Balance (Dynamic) Fair -   Weight Shift Sitting Fair   Weight Shift Standing Fair   Bed Mobility    Supine to Sit Standby Assist   Sit to Supine (NT, left up in chair)   Scooting Standby Assist   Gait Analysis   Gait Level Of Assist Minimal Assist   Assistive Device Front Wheel Walker   Distance (Feet) 50   # of Times Distance was Traveled 1   Deviation Step To;Antalgic (kyphotic posture)   Weight Bearing Status RLE Heel WB   Functional Mobility   Sit to Stand Minimal Assist (LOB back to sitting on EOB; Height of bed elevated due to pt height.)   Bed, Chair, Wheelchair Transfer Contact Guard Assist   Transfer Method Stand Step   Mobility bed mob, ambulation, up to chair   Activity Tolerance   Sitting in Chair Post " session   Sitting Edge of Bed 5 min   Standing 8 min   Short Term Goals    Short Term Goal # 1 Pt will perform supine <> sit without bed features with SPV in 6 visits to progress bed mobility   Short Term Goal # 2 Pt will perform STS with FWW and SPV in 6 visits to progress OOB mobility   Short Term Goal # 3 Pt will perform stand pivot transfers with FWW and SPV in 6 visits to progress functional OOB mobility   Short Term Goal # 4 Pt will ambulate 200 ft with FWW and SPV in 6 visits to progress functional gait   Education Group   Education Provided Role of Physical Therapist;Use of Assistive Device;Brace Wear and Care;Weight Bearing Precautions   Role of Physical Therapist Patient Response Patient;Acceptance;Explanation;Verbal Demonstration   Use of Assistive Device Patient Response Patient;Acceptance;Explanation;Verbal Demonstration   Weight Bearing Precautions Patient Response Patient;Acceptance;Explanation;Verbal Demonstration   Brace Wear & Care Patient Response Patient;Acceptance;Explanation;Verbal Demonstration

## 2024-11-25 NOTE — PROGRESS NOTES
Vascular surgery    Continues to do well from vascular surgery standpoint  Incision looks good  I believe patient is going to rehab today    Okay to disposition from vascular surgery standpoint    Follow-up Dr. Flores 2 weeks    Austin Mendez MD   Yes

## 2024-11-25 NOTE — DISCHARGE PLANNING
Case Management Discharge Planning    Admission Date: 11/18/2024  GMLOS: 7.3  ALOS: 7    6-Clicks ADL Score: 17  6-Clicks Mobility Score: 17    Anticipated Discharge Dispo: Discharge Disposition: D/T to SNF with Medicare cert in anticipation of skilled care (03)    DME Needed: No    Action(s) Taken: LMSW completed chart review. Pt has been accepted at [facility] and [facility]. Per previous LSW note, pt expressed concern with transportation as he was recently in debt from a previous ambulance ride. LMSW met with pt at bedside to discuss this. Pt confirmed that he is agreeable to DC to SNF however, requested transportation assistance for reason stated above. Pt made choice for 1) [facility]. Choice form was faxed to [facility]. ASF can be requested at time of transport. Awaiting insurance authorization.    Escalations Completed: None    Medically Clear: Yes    Next Steps: LMSW to follow for any additional CM needs.    Barriers to Discharge: Pending Insurance Authorization    Is the patient up for discharge tomorrow: Potentially.

## 2024-11-25 NOTE — DISCHARGE PLANNING
0942  Agency/Facility Name: [facility]  Spoke To: Karen: [phone]  Outcome: DPA received call regarding insurance auth. DPA notified facility we do not have choice at this time and to hold off on start insurance auth until we do. Facility is also okay with family transporting pt to facility if [facility] is choice.     1030  Agency/Facility Name: [facility]  Spoke To: Sona  Outcome: DPA called to request facility start insurance auth.  took a message and DPA is awaiting a call back.     0282  Agency/Facility Name: [facility]  Outcome: DPA called regarding insurance auth. Left VM with name and callback number.

## 2024-11-25 NOTE — CARE PLAN
The patient is Stable - Low risk of patient condition declining or worsening  Problem: Knowledge Deficit - Standard  Goal: Patient and family/care givers will demonstrate understanding of plan of care, disease process/condition, diagnostic tests and medications  Outcome: Progressing     Problem: Pain - Standard  Goal: Alleviation of pain or a reduction in pain to the patient’s comfort goal  11/25/2024 0857 by Sydney Marquis RBEENA  Flowsheets  Taken 11/25/2024 0857  OB Pain Intervention: Declines  Taken 11/25/2024 0853  Pain Rating Scale (NPRS): 0  11/25/2024 0856 by Sydney Marquis RTayaNTaya  Outcome: Progressing

## 2024-11-25 NOTE — PROGRESS NOTES
Vascular surgery    Patient awake and alert  Right foot warm  Incision looks good    Patient is got a warrant in all likelihood a transmetatarsal amputation of that right foot.  The patient has been revascularized would give it a day or 2 for some IV antibiotics but will likely benefit from definitive care prior to disposition.  Patient has a gangrenous toe and I would personally recommend a transmet but will discuss with orthopedics    Austin Mendez MD

## 2024-11-26 PROBLEM — Z86.73 HISTORY OF CVA (CEREBROVASCULAR ACCIDENT): Status: ACTIVE | Noted: 2022-07-02

## 2024-11-26 PROCEDURE — 770001 HCHG ROOM/CARE - MED/SURG/GYN PRIV*

## 2024-11-26 PROCEDURE — 99232 SBSQ HOSP IP/OBS MODERATE 35: CPT | Performed by: INTERNAL MEDICINE

## 2024-11-26 PROCEDURE — 700102 HCHG RX REV CODE 250 W/ 637 OVERRIDE(OP)

## 2024-11-26 PROCEDURE — A9270 NON-COVERED ITEM OR SERVICE: HCPCS

## 2024-11-26 PROCEDURE — A9270 NON-COVERED ITEM OR SERVICE: HCPCS | Performed by: SURGERY

## 2024-11-26 PROCEDURE — 700111 HCHG RX REV CODE 636 W/ 250 OVERRIDE (IP): Mod: JZ | Performed by: SURGERY

## 2024-11-26 PROCEDURE — 700102 HCHG RX REV CODE 250 W/ 637 OVERRIDE(OP): Performed by: SURGERY

## 2024-11-26 RX ADMIN — ATORVASTATIN CALCIUM 80 MG: 40 TABLET, FILM COATED ORAL at 16:45

## 2024-11-26 RX ADMIN — OXYCODONE 5 MG: 5 TABLET ORAL at 19:50

## 2024-11-26 RX ADMIN — ASPIRIN 81 MG: 81 TABLET, COATED ORAL at 04:35

## 2024-11-26 RX ADMIN — LISINOPRIL 30 MG: 10 TABLET ORAL at 04:36

## 2024-11-26 RX ADMIN — METOPROLOL TARTRATE 12.5 MG: 25 TABLET, FILM COATED ORAL at 16:45

## 2024-11-26 RX ADMIN — METOPROLOL TARTRATE 12.5 MG: 25 TABLET, FILM COATED ORAL at 04:35

## 2024-11-26 RX ADMIN — NICOTINE TRANSDERMAL SYSTEM 21 MG: 21 PATCH, EXTENDED RELEASE TRANSDERMAL at 04:36

## 2024-11-26 RX ADMIN — ENOXAPARIN SODIUM 40 MG: 100 INJECTION SUBCUTANEOUS at 16:44

## 2024-11-26 RX ADMIN — CLOPIDOGREL BISULFATE 75 MG: 75 TABLET ORAL at 04:35

## 2024-11-26 ASSESSMENT — ENCOUNTER SYMPTOMS
SHORTNESS OF BREATH: 0
COUGH: 0
FOCAL WEAKNESS: 0
NAUSEA: 0
PALPITATIONS: 0
ABDOMINAL PAIN: 0
FEVER: 0
NECK PAIN: 0
SORE THROAT: 0
BRUISES/BLEEDS EASILY: 0
BLURRED VISION: 0
SEIZURES: 0
VOMITING: 0
CHILLS: 0

## 2024-11-26 ASSESSMENT — PAIN DESCRIPTION - PAIN TYPE
TYPE: ACUTE PAIN
TYPE: ACUTE PAIN;SURGICAL PAIN

## 2024-11-26 NOTE — PROGRESS NOTES
Hospital Medicine Daily Progress Note    Date of Service  11/26/2024    Chief Complaint  Arnulfo Crocker is a 68 y.o. male admitted 11/18/2024 with right foot pain    Hospital Course  68-year-old male with past medical history of CVA, hypertension, and tobacco use who presents due to progressive wound of his right foot complicated by gangrene over the lateral aspect of the foot with necrosis of fourth and fifth digits. CT of the lower extremity with multiple areas of arterial stenosis. Vascular surgery was consulted. X-ray of the right foot demonstrating osteomyelitis.    Interval Problem Update  Patient was seen and examined at bedside.  No acute events overnight. Patient is resting comfortably in bed and in no acute distress.     S/p Right common femoral and profunda femoral endarterectomy and  Right common femoral to below knee popliteal bypass using reversed right greater saphenous vein.  S/p  Right foot transmetatarsal amputation 11/22  S/p antibiotics  Awaiting placement    I have discussed this patient's plan of care and discharge plan at IDT rounds today with Case Management, Nursing, Nursing leadership, and other members of the IDT team.    Consultants/Specialty  orthopedics and vascular surgery    Code Status  DNAR/DNI    Disposition  medically cleared for SNF  I have placed the appropriate orders for post-discharge needs.    Review of Systems  Review of Systems   Constitutional:  Negative for chills and fever.   HENT:  Negative for congestion and sore throat.    Eyes:  Negative for blurred vision.   Respiratory:  Negative for cough and shortness of breath.    Cardiovascular:  Negative for chest pain and palpitations.   Gastrointestinal:  Negative for abdominal pain, nausea and vomiting.   Genitourinary:  Negative for dysuria and frequency.   Musculoskeletal:  Negative for joint pain and neck pain.   Skin:  Negative for rash.   Neurological:  Negative for focal weakness and seizures.   Endo/Heme/Allergies:   Does not bruise/bleed easily.   Psychiatric/Behavioral:  Negative for suicidal ideas.         Physical Exam  Temp:  [36.4 °C (97.5 °F)-36.9 °C (98.4 °F)] 36.8 °C (98.2 °F)  Pulse:  [69-83] 69  Resp:  [15-17] 17  BP: (111-136)/(64-80) 122/65  SpO2:  [91 %-97 %] 97 %    Physical Exam  Vitals and nursing note reviewed.   Constitutional:       General: He is not in acute distress.  HENT:      Head: Normocephalic.      Right Ear: External ear normal.      Left Ear: External ear normal.      Mouth/Throat:      Mouth: Mucous membranes are moist.   Eyes:      General: No scleral icterus.     Pupils: Pupils are equal, round, and reactive to light.   Cardiovascular:      Rate and Rhythm: Normal rate and regular rhythm.      Pulses: Normal pulses.      Heart sounds: Normal heart sounds. No murmur heard.  Pulmonary:      Effort: Pulmonary effort is normal.      Breath sounds: Normal breath sounds. No wheezing.   Abdominal:      Palpations: Abdomen is soft.      Tenderness: There is no abdominal tenderness. There is no guarding or rebound.   Musculoskeletal:         General: No swelling.      Cervical back: Neck supple.      Comments: Right foot status post TMA with dressings in place   Skin:     General: Skin is warm.      Coloration: Skin is not jaundiced.   Neurological:      General: No focal deficit present.      Mental Status: He is alert and oriented to person, place, and time.   Psychiatric:         Mood and Affect: Mood normal.         Behavior: Behavior normal.         Fluids    Intake/Output Summary (Last 24 hours) at 11/26/2024 1509  Last data filed at 11/26/2024 0440  Gross per 24 hour   Intake 240 ml   Output 350 ml   Net -110 ml        Laboratory  Recent Labs     11/25/24  0310   WBC 7.6   RBC 3.00*   HEMOGLOBIN 9.6*   HEMATOCRIT 27.7*   MCV 92.3   MCH 32.0   MCHC 34.7   RDW 51.0*   PLATELETCT 214   MPV 9.4     Recent Labs     11/25/24  0310   SODIUM 134*   POTASSIUM 3.2*   CHLORIDE 101   CO2 24   GLUCOSE 109*    BUN 11   CREATININE 0.59   CALCIUM 7.4*                   Imaging  DX-OPERATIVE ANGIOGRAM EACH PROJ   Final Result      Digitized intraoperative radiograph is submitted for review. This examination is not for diagnostic purpose but for guidance during a surgical procedure. Please see the patient's chart for full procedural details.         INTERPRETING LOCATION: [address], [place], [address]      DX-PORTABLE FLUORO > 1 HOUR   Final Result      Portable fluoroscopy utilized for 48 seconds.      INTERPRETING LOCATION: [address], [place], [address]      CT-CTA LOWER EXT WITH & W/O-POST PROCESS RIGHT   Final Result         1.  Extensive bilateral atherosclerotic plaque.   2.  Occlusion of the right superficial femoral artery which reconstitutes distally. Greater than 70% stenosis of the distal right superficial femoral artery.   3.  Greater than 70% stenosis of the distal right external iliac artery,   4.  50-70% stenosis of the right tibioperoneal trunk with three-vessel runoff at the right ankle.   5.  Greater than 70% stenosis of the left distal external iliac artery.   6.  50-70% stenosis of the distal left superficial femoral artery   7.  50-70% stenosis of the left popliteal artery.   8.  Greater than 70% stenosis of the left tibioperoneal trunk with occlusion of the proximal posterior tibial artery. Small caliber left posterior tibial artery is seen distally otherwise there appears to be three-vessel runoff of the left ankle.   9.  Soft tissue wound of the right fourth and fifth toes with air in the fifth toe bony structures, compatible with osteomyelitis   10.  Soft tissue edema of the right leg below the knee.      These findings were discussed with the patient's clinician, Luiz Richardson, on 11/18/2024 10:07 PM.      DX-CHEST-PORTABLE (1 VIEW)   Final Result         1.  Right basilar atelectasis and/or subtle infiltrates.   2.  Trace right pleural effusion   3.  Right rib fractures are seen, appear likely  subacute      IR-ABDOMINAL AORTA-WITH RUNOFF    (Results Pending)        Assessment/Plan  * Osteomyelitis of Right 4th and 5th toe(HCC)- (present on admission)  Assessment & Plan  At [facility], x-ray presented osteomyelitis as well as some soft tissue gas on the right foot.  Ultrasound presented significant peripheral artery disease.  Received 1 dose of ceftriaxone and vancomycin.  Transferred to [facility] for vascular consult.  At [facility], CTA lower extremity presented extensive bilateral arthrosclerotic plaque and occlusion of right superficial femoral artery, greater then 70% stenosis of the distal right external iliac artery and multiple stenosis.  ED physician contacted vascular surgeon who is planning to evaluate tomorrow 11/19/2024.  Received 1 dose of metronidazole  -Admitted Med/Surg floor  -Unasyn and linezolid  -Follow-up blood and wound culture collected on 11/19-20/2024   Vascular surgery consulted,   Patient underwent Right common femoral and profunda femoral endarterectomy and  Right common femoral to below knee popliteal bypass using reversed right greater saphenous vein.   Status post transmetatarsal amputation with Ortho.   PT OT    History of CVA (cerebrovascular accident)- (present on admission)  Assessment & Plan  Left frontal lobe/insular infarct 7/2/2022  -Hold home aspirin due to possible amputation scheduled on 1119/24    Tobacco use disorder  Assessment & Plan  - Nicotine patch placed    Hyperlipidemia  Assessment & Plan  -Continue home atorvastatin    SIRS (systemic inflammatory response syndrome) ([facility])  Assessment & Plan  SIRS criteria identified on my evaluation include:  Tachycardia, with heart rate greater than 90 BPM and Leukocytosis, with WBC greater than 12,000  SIRS is non-infectious, the patient does not have sepsis  Elevated C-reactive, likely reactive leukocytosis secondary to osteomyelitis    Essential hypertension- (present on admission)  Assessment & Plan  -Continue  home lisinopril and metoprolol tartrate         VTE prophylaxis: scd    I have performed a physical exam and reviewed and updated ROS and Plan today (11/26/2024). In review of yesterday's note (11/25/2024), there are no changes except as documented above.    Greater than 45 minutes spent prepping to see patient (e.g. review of tests) obtaining and/or reviewing separately obtained history. Performing a medically appropriate examination and/ evaluation.  Counseling and educating the patient/family/caregiver.  Ordering medications, tests, or procedures.  Referring and communicating with other health care professionals.  Documenting clinical information in EPIC.  Independently interpreting results and communicating results to patient/family/caregiver.  Care coordination.

## 2024-11-26 NOTE — DISCHARGE PLANNING
Patient is medically cleared for transfer. [facility] declined due to not in provider network for [facility] access [facility].   Pending response from [facility] as they need to submit for PA.

## 2024-11-26 NOTE — CARE PLAN
The patient is Stable - Low risk of patient condition declining or worsening    Shift Goals  Clinical Goals: mobility, IV abx  Patient Goals: comfort  Family Goals: not present    Progress made toward(s) clinical / shift goals:  yes      Problem: Knowledge Deficit - Standard  Goal: Patient and family/care givers will demonstrate understanding of plan of care, disease process/condition, diagnostic tests and medications  Outcome: Progressing     Problem: Pain - Standard  Goal: Alleviation of pain or a reduction in pain to the patient’s comfort goal  Outcome: Progressing     Problem: Infection  Goal: Will remain free from infection  Outcome: Progressing     Problem: Safety  Goal: Will remain free from injury  Outcome: Progressing  Goal: Will remain free from falls  Outcome: Progressing     Problem: Hemodynamics  Goal: Patient's hemodynamics, fluid balance and neurologic status will be stable or improve  Outcome: Progressing     Problem: Wound/ / Incision Healing  Goal: Patient's wound/surgical incision will decrease in size and heals properly  Outcome: Progressing     Problem: Fluid Volume  Goal: Fluid volume balance will be maintained  Outcome: Progressing     Problem: Bowel Elimination  Goal: Establish and maintain regular bowel function  Outcome: Progressing     Problem: Fall Risk  Goal: Patient will remain free from falls  Outcome: Progressing

## 2024-11-26 NOTE — DISCHARGE PLANNING
0820  DPA received message via [employer] from [facility] stating they are not contracted with pt's insurance.     0925  DPA sent message via Epic requesting [facility] start insurance auth.    1423  Agency/Facility Name: [facility]  Outcome: DPA called regarding insurance auth. Left  with name and callback number.

## 2024-11-26 NOTE — PROGRESS NOTES
"    Orthopedic PA Progress Note    Interval changes:  Patient doing well.   R foot dressings are CDI  Heel WB RLE in offloading shoe  Follow up with the [facility] trauma [facility] 3 weeks postop.  Cleared for DC from orthopedic standpoint     ROS - Patient denies any new issues.  Denies any numbness or tingling. Pain controlled.    /65   Pulse 81   Temp 36.4 °C (97.5 °F) (Temporal)   Resp 16   Ht 1.981 m (6' 6\")   Wt 82.5 kg (181 lb 14.1 oz)   SpO2 92%     Patient seen and examined  No acute distress  Breathing non labored  RRR  RLE: Dressings CDI    Recent Labs     11/25/24  0310   WBC 7.6   RBC 3.00*   HEMOGLOBIN 9.6*   HEMATOCRIT 27.7*   MCV 92.3   MCH 32.0   MCHC 34.7   RDW 51.0*   PLATELETCT 214   MPV 9.4       Active Hospital Problems    Diagnosis     SIRS (systemic inflammatory response syndrome) ([facility]) [R65.10]     Hyperlipidemia [E78.5]     Osteomyelitis of Right 4th and 5th toe([facility]) [M86.9]     Left-sided cerebrovascular accident (CVA) ([facility]) [I63.9]     Essential hypertension [I10]        DX-OPERATIVE ANGIOGRAM EACH PROJ   Final Result      Digitized intraoperative radiograph is submitted for review. This examination is not for diagnostic purpose but for guidance during a surgical procedure. Please see the patient's chart for full procedural details.         INTERPRETING LOCATION: [address] [place] [address], [address]      DX-PORTABLE FLUORO > 1 HOUR   Final Result      Portable fluoroscopy utilized for 48 seconds.      INTERPRETING LOCATION: [address] [place] [address], [address]      CT-CTA LOWER EXT WITH & W/O-POST PROCESS RIGHT   Final Result         1.  Extensive bilateral atherosclerotic plaque.   2.  Occlusion of the right superficial femoral artery which reconstitutes distally. Greater than 70% stenosis of the distal right superficial femoral artery.   3.  Greater than 70% stenosis of the distal right external iliac artery,   4.  50-70% stenosis of the right tibioperoneal trunk with three-vessel runoff at the " right ankle.   5.  Greater than 70% stenosis of the left distal external iliac artery.   6.  50-70% stenosis of the distal left superficial femoral artery   7.  50-70% stenosis of the left popliteal artery.   8.  Greater than 70% stenosis of the left tibioperoneal trunk with occlusion of the proximal posterior tibial artery. Small caliber left posterior tibial artery is seen distally otherwise there appears to be three-vessel runoff of the left ankle.   9.  Soft tissue wound of the right fourth and fifth toes with air in the fifth toe bony structures, compatible with osteomyelitis   10.  Soft tissue edema of the right leg below the knee.      These findings were discussed with the patient's clinician, Luiz Richardson, on 11/18/2024 10:07 PM.      DX-CHEST-PORTABLE (1 VIEW)   Final Result         1.  Right basilar atelectasis and/or subtle infiltrates.   2.  Trace right pleural effusion   3.  Right rib fractures are seen, appear likely subacute      IR-ABDOMINAL AORTA-WITH RUNOFF    (Results Pending)       Assessment/Plan:  Patient doing well.   R foot dressings are CDI  Heel WB RLE in offloading shoe  Follow up with the [facility] trauma [facility] clinic 3 weeks postop.  Cleared for DC from orthopedic standpoint     POD#3 S/p  Right foot transmetatarsal amputation   Wt bearing status - Heel WB RLE  Future Procedures - None planned   Wound care/Drains - Dressings to be changed every other day by nursing. Or PRN for saturation starting POD#2  Sutures/Staples out- 14-21 days post operatively. Removal will completed by ortho [facility] unless transferred.  Inpatient DVT prophylaxis: ASA/Plavix  DVT Prophylaxis outpatient: ASA 81 mg PO BID x4 weeks  PT/OT-initiated  Antibiotics: unasyn; Perioperative completed  DVT Prophylaxis- TEDS/SCDs/Foot pumps  Case Coordination for Discharge Planning - Disposition per therapy recs.

## 2024-11-26 NOTE — CARE PLAN
The patient is Stable - Low risk of patient condition declining or worsening    Shift Goals  Clinical Goals: `pain control, mobility, rest  Patient Goals: pain control, sleep  Family Goals: not present    Progress made toward(s) clinical / shift goals:    Problem: Knowledge Deficit - Standard  Goal: Patient and family/care givers will demonstrate understanding of plan of care, disease process/condition, diagnostic tests and medications  Outcome: Progressing     Problem: Pain - Standard  Goal: Alleviation of pain or a reduction in pain to the patient’s comfort goal  Outcome: Progressing     Problem: Safety  Goal: Will remain free from falls  Outcome: Progressing       Patient is not progressing towards the following goals:

## 2024-11-26 NOTE — PROGRESS NOTES
Vascular surgery    No issues patient doing well with no complaints  Incisions clean dry and intact    Awaiting disposition    Austin Mendez MD

## 2024-11-26 NOTE — PROGRESS NOTES
"      Orthopaedic Progress Note    Interval changes:  Patient doing well   RLE dressings changed incision without issues  Cleared for DC to home vs SNF by ortho pending medicine clearance    ROS - Patient denies any new issues.  Pain well controlled.    /80   Pulse 75   Temp 36.6 °C (97.9 °F) (Temporal)   Resp 17   Ht 1.981 m (6' 6\")   Wt 82.5 kg (181 lb 14.1 oz)   SpO2 94%     Patient seen and examined  No acute distress  Breathing non labored  RRR  RLE dressings changed incision without issues, DNVI, moves all toes, cap refill <2 sec.     Recent Labs     11/25/24  0310   WBC 7.6   RBC 3.00*   HEMOGLOBIN 9.6*   HEMATOCRIT 27.7*   MCV 92.3   MCH 32.0   MCHC 34.7   RDW 51.0*   PLATELETCT 214   MPV 9.4       Active Hospital Problems    Diagnosis     SIRS (systemic inflammatory response syndrome) ([facility]) [R65.10]     Hyperlipidemia [E78.5]     Osteomyelitis of Right 4th and 5th toe([facility]) [M86.9]     Left-sided cerebrovascular accident (CVA) ([facility]) [I63.9]     Essential hypertension [I10]        Assessment/Plan:  Patient doing well   RLE dressings changed incision without issues  Cleared for DC to home vs SNF by ortho pending medicine clearance  POD#4 S/P Right foot transmetatarsal amputation   Wt bearing status - heel WBAT in post op shoe  Wound care/Drains - Dressings to be changed every other day by nursing. Or PRN for saturation starting POD#2  Future Procedures - none planned   Lovenox: Start 11/22, Duration-until ambulatory > 150'  Sutures/Staples out- 14-21 days post operatively. Removal will completed by ortho mid levels only.  PT/OT-initiated  Antibiotics: Perioperative completed  DVT Prophylaxis- TEDS/SCDs/Foot pumps  Fam-not needed per ortho  Case Coordination for Discharge Planning - Disposition per therapy recs.    "

## 2024-11-27 VITALS
DIASTOLIC BLOOD PRESSURE: 59 MMHG | SYSTOLIC BLOOD PRESSURE: 91 MMHG | BODY MASS INDEX: 21.04 KG/M2 | WEIGHT: 181.88 LBS | HEART RATE: 65 BPM | HEIGHT: 78 IN | TEMPERATURE: 97.6 F | RESPIRATION RATE: 16 BRPM | OXYGEN SATURATION: 93 %

## 2024-11-27 PROCEDURE — 97530 THERAPEUTIC ACTIVITIES: CPT

## 2024-11-27 PROCEDURE — A9270 NON-COVERED ITEM OR SERVICE: HCPCS

## 2024-11-27 PROCEDURE — A9270 NON-COVERED ITEM OR SERVICE: HCPCS | Performed by: SURGERY

## 2024-11-27 PROCEDURE — 97535 SELF CARE MNGMENT TRAINING: CPT

## 2024-11-27 PROCEDURE — 700102 HCHG RX REV CODE 250 W/ 637 OVERRIDE(OP): Performed by: SURGERY

## 2024-11-27 PROCEDURE — 99239 HOSP IP/OBS DSCHRG MGMT >30: CPT | Performed by: INTERNAL MEDICINE

## 2024-11-27 PROCEDURE — 700102 HCHG RX REV CODE 250 W/ 637 OVERRIDE(OP)

## 2024-11-27 RX ORDER — METOPROLOL TARTRATE 25 MG/1
12.5 TABLET, FILM COATED ORAL 2 TIMES DAILY
Qty: 60 TABLET | Refills: 1 | Status: SHIPPED | OUTPATIENT
Start: 2024-11-27 | End: 2024-12-03 | Stop reason: SDUPTHER

## 2024-11-27 RX ORDER — CLOPIDOGREL BISULFATE 75 MG/1
75 TABLET ORAL DAILY
Qty: 30 TABLET | Refills: 1 | Status: SHIPPED | OUTPATIENT
Start: 2024-11-28 | End: 2024-12-03 | Stop reason: SDUPTHER

## 2024-11-27 RX ADMIN — ASPIRIN 81 MG: 81 TABLET, COATED ORAL at 04:27

## 2024-11-27 RX ADMIN — METOPROLOL TARTRATE 12.5 MG: 25 TABLET, FILM COATED ORAL at 04:28

## 2024-11-27 RX ADMIN — CLOPIDOGREL BISULFATE 75 MG: 75 TABLET ORAL at 04:27

## 2024-11-27 RX ADMIN — NICOTINE TRANSDERMAL SYSTEM 21 MG: 21 PATCH, EXTENDED RELEASE TRANSDERMAL at 04:27

## 2024-11-27 RX ADMIN — LISINOPRIL 30 MG: 10 TABLET ORAL at 04:27

## 2024-11-27 ASSESSMENT — COGNITIVE AND FUNCTIONAL STATUS - GENERAL
SUGGESTED CMS G CODE MODIFIER DAILY ACTIVITY: CJ
WALKING IN HOSPITAL ROOM: A LITTLE
MOBILITY SCORE: 18
TURNING FROM BACK TO SIDE WHILE IN FLAT BAD: A LITTLE
DRESSING REGULAR LOWER BODY CLOTHING: A LITTLE
MOVING TO AND FROM BED TO CHAIR: A LITTLE
MOVING FROM LYING ON BACK TO SITTING ON SIDE OF FLAT BED: A LITTLE
STANDING UP FROM CHAIR USING ARMS: A LITTLE
SUGGESTED CMS G CODE MODIFIER MOBILITY: CK
DAILY ACTIVITIY SCORE: 22
CLIMB 3 TO 5 STEPS WITH RAILING: A LITTLE
HELP NEEDED FOR BATHING: A LITTLE

## 2024-11-27 ASSESSMENT — GAIT ASSESSMENTS
ASSISTIVE DEVICE: FRONT WHEEL WALKER
DISTANCE (FEET): 400
GAIT LEVEL OF ASSIST: SUPERVISED

## 2024-11-27 NOTE — PROGRESS NOTES
"      Orthopaedic Progress Note    Interval changes:  Patient doing well   RLE dressings changed incision without issues  Cleared for DC to home vs SNF by ortho pending medicine clearance    ROS - Patient denies any new issues.  Pain well controlled.    BP 91/59   Pulse 65   Temp 36.4 °C (97.6 °F) (Temporal)   Resp 16   Ht 1.981 m (6' 6\")   Wt 82.5 kg (181 lb 14.1 oz)   SpO2 93%     Patient seen and examined  No acute distress  Breathing non labored  RRR  RLE dressings changed incision without issues, DNVI, moves all toes, cap refill <2 sec.     Recent Labs     11/25/24  0310   WBC 7.6   RBC 3.00*   HEMOGLOBIN 9.6*   HEMATOCRIT 27.7*   MCV 92.3   MCH 32.0   MCHC 34.7   RDW 51.0*   PLATELETCT 214   MPV 9.4       Active Hospital Problems    Diagnosis     SIRS (systemic inflammatory response syndrome) ([facility]) [R65.10]     Hyperlipidemia [E78.5]     Osteomyelitis of Right 4th and 5th toe([facility]) [M86.9]     History of CVA (cerebrovascular accident) [Z86.73]     Essential hypertension [I10]        Assessment/Plan:  Patient doing well   RLE dressings changed incision without issues  Cleared for DC to home vs SNF by ortho pending medicine clearance  POD#5 S/P Right foot transmetatarsal amputation   Wt bearing status - heel WBAT in post op shoe  Wound care/Drains - Dressings to be changed every other day by nursing. Or PRN for saturation starting POD#2  Future Procedures - none planned   Lovenox: Start 11/22, Duration-until ambulatory > 150'  Sutures/Staples out- 14-21 days post operatively. Removal will completed by ortho mid levels only.  PT/OT-initiated  Antibiotics: Perioperative completed  DVT Prophylaxis- TEDS/SCDs/Foot pumps  Fam-not needed per ortho  Case Coordination for Discharge Planning - Disposition per therapy recs.    "

## 2024-11-27 NOTE — THERAPY
"Occupational Therapy  Daily Treatment     Patient Name: Arnulfo Crocker  Age:  69 y.o., Sex:  male  Medical Record #: [id number]  Today's Date: 11/27/2024     Precautions  Precautions: (P) Fall Risk, Heel Weight Bearing Right Lower Extremity, Offloading Shoe    Assessment    Pt seen for OT tx. Pt has progressed in all areas of ADLs and functional mobility. Pt supervised with use of FWW for mobility in , did become dizzy and hypotensive RN notified and improved once BTB. Discussed home safety, recommendations for shower chair and reacher. Pt has good support at home. Recommend HHOT.     Plan    Treatment Plan Status: (P) Continue Current Treatment Plan  Type of Treatment: Self Care / Activities of Daily Living, Adaptive Equipment, Therapeutic Exercises, Therapeutic Activity  Treatment Frequency: 4 Times per Week  Treatment Duration: Until Therapy Goals Met    DC Equipment Recommendations: (P) Tub / Shower Seat  Discharge Recommendations: (P) Recommend home health for continued occupational therapy services    Subjective    \"I have a lot of support at home\"      Objective       11/27/24 0936   Precautions   Precautions Fall Risk;Heel Weight Bearing Right Lower Extremity;Offloading Shoe   Vitals   Patient BP Position Sitting   Blood Pressure  91/59   O2 Delivery Device None - Room Air   Vitals Comments pt dizzy with standing, BP 74/44 once in chair, improved once BTB. RN notified   Pain   Intervention Rest   Pain 0 - 10 Group   Location Foot   Location Orientation Right   Therapist Pain Assessment During Activity;Post Activity Pain Same as Prior to Activity;Nurse Notified;2   Non Verbal Descriptors   Non Verbal Scale  Calm   Cognition    Cognition / Consciousness WDL   Speech/ Communication Delayed Responses   Level of Consciousness Alert   Comments pt with prior mild cognitive impairments d/t stroke. pt with good insight and ability to express impairments. receptive to all educ   Active ROM Upper Body   Comments BUE " functional   Other Treatments   Other Treatments Provided educ pt on DME required for home use, safety at home, mobility and ADL performance   Balance   Sitting Balance (Static) Fair +   Sitting Balance (Dynamic) Fair +   Standing Balance (Static) Fair   Standing Balance (Dynamic) Fair   Weight Shift Sitting Fair   Weight Shift Standing Fair   Skilled Intervention Verbal Cuing;Tactile Cuing   Comments w/FWW   Bed Mobility    Sit to Supine Supervised   Scooting Supervised   Skilled Intervention Verbal Cuing   Activities of Daily Living   Eating Independent   Grooming Supervision;Seated   Upper Body Dressing Supervision   Lower Body Dressing Minimal Assist  (don pants)   Toileting Standby Assist   Skilled Intervention Verbal Cuing;Tactile Cuing   Comments pt performing pericare seated, reports hitting hand. only difficult at hospital d/t commode   Functional Mobility   Sit to Stand Supervised   Bed, Chair, Wheelchair Transfer Supervised   Toilet Transfers Supervised   Transfer Method Stand Step   Skilled Intervention Verbal Cuing;Tactile Cuing   Comments w/FWW   Activity Tolerance   Sitting in Chair pre session   Standing 5 min   Patient / Family Goals   Patient / Family Goal #1 to go home   Short Term Goals   Short Term Goal # 1 Pt will complete ADL txfs with supv   Goal Outcome # 1 Goal met   Short Term Goal # 2 Pt will complete LB dressing (including post-op shoe) with supv using AE PRN   Goal Outcome # 2 Progressing as expected   Short Term Goal # 3 Pt will complete toileting ADLs with supv   Goal Outcome # 3 Progressing as expected   Short Term Goal # 4 Pt will complete standing g/h routine with supv   Goal Outcome # 4 Progressing as expected   Education Group   Role of Occupational Therapist Patient Response Patient;Acceptance;Explanation;Verbal Demonstration   Brace Wear & Care Patient Response Patient;Acceptance;Explanation;Verbal Demonstration   Home Safety Patient Response  Patient;Acceptance;Explanation;Reinforcement Needed   Transfers Patient Response Patient;Acceptance;Explanation;Reinforcement Needed   ADL Patient Response Patient;Acceptance;Explanation;Verbal Demonstration;Action Demonstration;Reinforcement Needed   Weight Bearing Precautions Patient Response Patient;Acceptance;Explanation;Demonstration;Verbal Demonstration;Action Demonstration   Occupational Therapy Treatment Plan    O.T. Treatment Plan Continue Current Treatment Plan   Anticipated Discharge Equipment and Recommendations   DC Equipment Recommendations Tub / Shower Seat   Discharge Recommendations Recommend home health for continued occupational therapy services   Interdisciplinary Plan of Care Collaboration   IDT Collaboration with  Nursing;;Physical Therapist   Patient Position at End of Therapy In Bed;Bed Alarm On;Tray Table within Reach;Call Light within Reach   Collaboration Comments RN updated   Session Information   Date / Session Number  11/27, #2 (2/4, 11/30)

## 2024-11-27 NOTE — CARE PLAN
Problem: Knowledge Deficit - Standard  Goal: Patient and family/care givers will demonstrate understanding of plan of care, disease process/condition, diagnostic tests and medications  Description: Target End Date:  1-3 days or as soon as patient condition allows    Document in Patient Education    1.  Patient and family/caregiver oriented to unit, equipment, visitation policy and means for communicating concern  2.  Complete/review Learning Assessment  3.  Assess knowledge level of disease process/condition, treatment plan, diagnostic tests and medications  4.  Explain disease process/condition, treatment plan, diagnostic tests and medications  Outcome: Progressing     Problem: Pain - Standard  Goal: Alleviation of pain or a reduction in pain to the patient’s comfort goal  Description: Target End Date:  Prior to discharge or change in level of care    Document on Vitals flowsheet    1.  Document pain using the appropriate pain scale per order or unit policy  2.  Educate and implement non-pharmacologic comfort measures (i.e. relaxation, distraction, massage, cold/heat therapy, etc.)  3.  Pain management medications as ordered  4.  Reassess pain after pain med administration per policy  5.  If opiods administered assess patient's response to pain medication is appropriate per POSS sedation scale  6.  Follow pain management plan developed in collaboration with patient and interdisciplinary team (including palliative care or pain specialists if applicable)  Outcome: Progressing     Problem: Infection  Goal: Will remain free from infection  Outcome: Progressing   The patient is Stable - Low risk of patient condition declining or worsening    Shift Goals  Clinical Goals: pt/ot, pain control  Patient Goals: pain control, discharge  Family Goals: n/a

## 2024-11-27 NOTE — THERAPY
Physical Therapy   Discharge     Patient Name: Arnulfo Crocker  Age:  69 y.o., Sex:  male  Medical Record #: [id number]  Today's Date: 11/27/2024     Precautions  Precautions: Fall Risk;Heel Weight Bearing Right Lower Extremity;Offloading Shoe    Assessment    Pt motivated for PT tx session, has made good progress with functional mobility.  Pt progressed to mobilize with SPV using FWW as detailed below, able to maintain RLE heel WB appropriately.  Pt declined need to ascend/descend stairs at home due to friends building a ramp, also verbalized appropriate strategies to navigate stairs if ramp is not built by the time he gets home.  Provided handouts for seated & supine LE and UE exercises for pt to practice at home.  No further acute PT needs.    Plan    Reason for Discharge From Therapy: Discharge Secondary to Goals Met    DC Equipment Recommendations: None (Pt owns FWW and 4WW.)  Discharge Recommendations: Recommend home health for continued physical therapy services (If HH not available where pt lives, recommend outpatient PT.)     Objective     11/27/24 0902   Precautions   Precautions Fall Risk;Heel Weight Bearing Right Lower Extremity;Offloading Shoe   Pain 0 - 10 Group   Therapist Pain Assessment Post Activity Pain Same as Prior to Activity;Nurse Notified;0   Cognition    Cognition / Consciousness WDL   Speech/ Communication Delayed Responses   Level of Consciousness Alert   Comments Pleasant & receptive to education   Other Treatments   Other Treatments Provided Educated pt on home health vs outpatient PT vs post acute placement.  Provided seated & supine LE exercise handouts, pt verbalized understanding.   Balance   Sitting Balance (Static) Fair +   Sitting Balance (Dynamic) Fair +   Standing Balance (Static) Fair   Standing Balance (Dynamic) Fair   Weight Shift Sitting Fair   Weight Shift Standing Fair   Skilled Intervention Verbal Cuing;Tactile Cuing   Bed Mobility    Supine to Sit Supervised   Sit to Supine  (NT, left up in chair)   Scooting Supervised   Skilled Intervention Verbal Cuing   Comments HOB flat, height of bed elevated for pt height.   Gait Analysis   Gait Level Of Assist Supervised   Assistive Device Front Wheel Walker   Distance (Feet) 400   # of Times Distance was Traveled 1   Deviation (bradykinetic; kyphotic posture)   Level of Assist with Stairs (Declined need to practice stairs, demo'd ability to lift legs onto each step & verbalized appropriate use of single rail + 4x4 for support. Pt stated his friends are building a ramp so he does not need to navigate stairs at home.)   Weight Bearing Status RLE Heel WB   Skilled Intervention Verbal Cuing;Tactile Cuing;Compensatory Strategies   Functional Mobility   Sit to Stand Supervised   Bed, Chair, Wheelchair Transfer Supervised   Transfer Method Stand Step   Mobility bed mob, ambulation, up to chair   Skilled Intervention Verbal Cuing;Compensatory Strategies   Activity Tolerance   Comments functional, denied dizziness/lightheadedness   Short Term Goals    Short Term Goal # 1 Pt will perform supine <> sit without bed features with SPV in 6 visits to progress bed mobility   Goal Outcome # 1 Goal met   Short Term Goal # 2 Pt will perform STS with FWW and SPV in 6 visits to progress OOB mobility   Goal Outcome # 2 Goal met   Short Term Goal # 3 Pt will perform stand pivot transfers with FWW and SPV in 6 visits to progress functional OOB mobility   Goal Outcome # 3 Goal met   Short Term Goal # 4 Pt will ambulate 200 ft with FWW and SPV in 6 visits to progress functional gait   Goal Outcome # 4 Goal met   Physical Therapy Treatment Plan   Reason For Discharge Discharge Secondary to Goals Met

## 2024-11-27 NOTE — DISCHARGE PLANNING
Case Management Discharge Planning    Admission Date: 11/18/2024  GMLOS: 7.3  ALOS: 9    6-Clicks ADL Score: 17  6-Clicks Mobility Score: 18    Anticipated Discharge Dispo: Discharge Disposition: D/T to home under [facility] care in anticipation of covered skilled care (06)    DME Needed: No    Action(s) Taken: Pt was discussed during morning rounds. Pt was initially set to DC to [facility] and awaiting insurance auth however, they have now declined d/t non-contracted insurance provider. LMSW requested [facility] to send [place] referrals and follow up with other accepting facilities.     UPDATE 0930  LMSW was informed by PT that pt has progressed to DC home with HH. Pt has [employer] and lives in [place], unsure if HH agencies will accept. LMSW requested HH order and met with pt at bedside to discuss HH choice. Pt made choice for 1) [facility]. Choice form was faxed to [facility]. Pt is aware that HH may not be arranged d/t insurance and home location. Pt is agreeable to do outpatient PT at [facility] as a second option.    UPDATE 1045  Pt has been accepted by [facility]. Care team was informed. LMSW met with pt at bedside to inform. Pt verbalized understanding and confirmed that he will have transportation home upon DC. Anticipating no further CM needs at this time.    Escalations Completed: None    Medically Clear: Yes    Next Steps: LMSW to follow for any additional CM needs.    Barriers to Discharge: None    Is the patient up for discharge tomorrow: No, today.

## 2024-11-27 NOTE — CARE PLAN
The patient is Stable - Low risk of patient condition declining or worsening    Shift Goals  Clinical Goals: pain control, mobility, rest  Patient Goals: pain control, sleep  Family Goals: not present    Progress made toward(s) clinical / shift goals:    Problem: Knowledge Deficit - Standard  Goal: Patient and family/care givers will demonstrate understanding of plan of care, disease process/condition, diagnostic tests and medications  Outcome: Progressing     Problem: Pain - Standard  Goal: Alleviation of pain or a reduction in pain to the patient’s comfort goal  Outcome: Progressing     Problem: Fall Risk  Goal: Patient will remain free from falls  Outcome: Progressing       Patient is not progressing towards the following goals:

## 2024-11-28 NOTE — HOSPITAL COURSE
68-year-old male with past medical history of CVA, hypertension, and tobacco use who presents due to progressive wound of his right foot complicated by gangrene over the lateral aspect of the foot with necrosis of fourth and fifth digits. CT of the lower extremity with multiple areas of arterial stenosis. Vascular surgery was consulted. X-ray of the right foot demonstrating osteomyelitis. S/p Right common femoral and profunda femoral endarterectomy and  Right common femoral to below knee popliteal bypass using reversed right greater saphenous vein. S/p  Right foot transmetatarsal amputation 11/22 S/p antibiotics. Initially recommended post acute placement but improved clinically in house and was cleared for discharge with home health by PT/OT. Case management assisted in coordinating home health, patient was agreeable to discharge. Patient was determined satisfactory for discharge with appropriate follow up.

## 2024-11-28 NOTE — DISCHARGE SUMMARY
Discharge Summary    CHIEF COMPLAINT ON ADMISSION  Chief Complaint   Patient presents with    Sent by MD     Pt sent by [facility] for R foot necrosis , per outside facility pt has osteomyelitis . Pt presents with 3rd and 4th toes on R foot that appear black and necrotic. Pt denies any pain.         Reason for Admission  Sent by MD     Admission Date  11/18/2024    CODE STATUS  DNAR/DNI    HPI & HOSPITAL COURSE  68-year-old male with past medical history of CVA, hypertension, and tobacco use who presents due to progressive wound of his right foot complicated by gangrene over the lateral aspect of the foot with necrosis of fourth and fifth digits. CT of the lower extremity with multiple areas of arterial stenosis. Vascular surgery was consulted. X-ray of the right foot demonstrating osteomyelitis. S/p Right common femoral and profunda femoral endarterectomy and  Right common femoral to below knee popliteal bypass using reversed right greater saphenous vein. S/p  Right foot transmetatarsal amputation 11/22 S/p antibiotics. Initially recommended post acute placement but improved clinically in house and was cleared for discharge with home health by PT/OT. Case management assisted in coordinating home health, patient was agreeable to discharge. Patient was determined satisfactory for discharge with appropriate follow up.    Therefore, he is discharged in fair and stable condition to home with close outpatient follow-up.    The patient met 2-midnight criteria for an inpatient stay at the time of discharge.    Discharge Date  11/27/2024    FOLLOW UP ITEMS POST DISCHARGE  Please follow up with PCP in 3-5 days for post hospitalization follow up and medication reconciliation.       DISCHARGE DIAGNOSES  Principal Problem:    Osteomyelitis of Right 4th and 5th toe(HCC) (POA: Yes)  Active Problems:    History of CVA (cerebrovascular accident) (POA: Yes)    Essential hypertension (POA: Yes)    SIRS (systemic  inflammatory response syndrome) (HCC) (POA: Unknown)    Hyperlipidemia (POA: Unknown)  Resolved Problems:    * No resolved hospital problems. *      FOLLOW UP  Future Appointments   Date Time Provider Department Center   3/19/2025  8:40 AM Guillaume Hansen P.A.-C. [facility] None     José Luis Flores M.D.  [address]  [facility] [address]  [facility] [address]  [phone]    Follow up in 2 week(s)        MEDICATIONS ON DISCHARGE     Medication List        START taking these medications        Instructions   clopidogrel 75 MG Tabs  Start taking on: November 28, 2024  Commonly known as: Plavix   Take 1 Tablet by mouth every day.  Dose: 75 mg            CHANGE how you take these medications        Instructions   metoprolol tartrate 25 MG Tabs  What changed:   how much to take  how to take this  when to take this  additional instructions  Commonly known as: Lopressor   Take 0.5 Tablets by mouth 2 times a day.  Dose: 12.5 mg            CONTINUE taking these medications        Instructions   aspirin 81 MG EC tablet   Take 1 Tablet by mouth every day.  Dose: 81 mg     atorvastatin 80 MG tablet  Commonly known as: Lipitor   Take 1 tablet by mouth every night     lisinopril 30 MG tablet  Commonly known as: Prinivil   Take 1 Tablet by mouth every day.  Dose: 30 mg              Allergies  No Known Allergies    DIET  Orders Placed This Encounter   Procedures    Diet Order Diet: Consistent CHO (Diabetic)     Standing Status:   Standing     Number of Occurrences:   1     Order Specific Question:   Diet:     Answer:   Consistent CHO (Diabetic) [4]       LABORATORY  Lab Results   Component Value Date    SODIUM 134 (L) 11/25/2024    POTASSIUM 3.2 (L) 11/25/2024    CHLORIDE 101 11/25/2024    CO2 24 11/25/2024    GLUCOSE 109 (H) 11/25/2024    BUN 11 11/25/2024    CREATININE 0.59 11/25/2024        Lab Results   Component Value Date    WBC 7.6 11/25/2024    HEMOGLOBIN 9.6 (L) 11/25/2024    HEMATOCRIT 27.7 (L) 11/25/2024    PLATELETCT 214 11/25/2024         Total time of the discharge process exceeds 35 minutes.

## (undated) DEVICE — GLOVE SIZE 8.0 SURGEON ACCELERATOR FREE GREEN (50PR/BX)

## (undated) DEVICE — SYRINGE NON SAFETY 5 CC 20 GA X 1-1/2 IN (100/BX 4BX/CA)

## (undated) DEVICE — CANNULA O2 COMFORT SOFT EAR ADULT 7 FT TUBING (50/CA)

## (undated) DEVICE — BLADE SURGICAL #11 - (50/BX)

## (undated) DEVICE — CANISTER SUCTION 3000ML MECHANICAL FILTER AUTO SHUTOFF MEDI-VAC NONSTERILE LF DISP (40EA/CA)

## (undated) DEVICE — TRAY, CV CATH MULTI-LUM.AK-25703

## (undated) DEVICE — SET EXTENSION WITH 2 PORTS (48EA/CA) ***PART #2C8610 IS A SUBSTITUTE*****

## (undated) DEVICE — GLOVE BIOGEL INDICATOR SZ 7.5 SURGICAL PF LTX - (50PR/BX 4BX/CA)

## (undated) DEVICE — BLADE SURGICAL #10 - (50/BX)

## (undated) DEVICE — SUTURE 2-0 VICRYL PLUS CT-1 36 (36PK/BX)"

## (undated) DEVICE — SYRINGE SAFETY 10 ML 18 GA X 1 1/2 BLUNT LL (100/BX 4BX/CA)

## (undated) DEVICE — SPONGE GAUZESTER 4 X 4 4PLY - (128PK/CA)

## (undated) DEVICE — GLIDEWIRE ANGLED .035 X 180 (5EA/BX)

## (undated) DEVICE — WRAP COBAN SELF-ADHERENT 6 IN X 5YDS STERILE TAN (12/CA)

## (undated) DEVICE — SUTURE 6-0 PROLENE BV-1 D/A 24 (36PK/BX)"

## (undated) DEVICE — COVER LIGHT HANDLE ALC PLUS DISP (18EA/BX)

## (undated) DEVICE — SUTURE 3-0 ETHILON 3-0 PSLX 30 BLACK (36PK/BX)

## (undated) DEVICE — GLOVE BIOGEL SZ 7 SURGICAL PF LTX - (50PR/BX 4BX/CA)

## (undated) DEVICE — ADHESIVE MASTISOL - (48/BX)

## (undated) DEVICE — GLOVE BIOGEL PI INDICATOR SZ 7.0 SURGICAL PF LF - (50/BX 4BX/CA)

## (undated) DEVICE — NEEDLE THINWALL 19GA X 7CM

## (undated) DEVICE — SUTURE CV

## (undated) DEVICE — SUTURE 5-0 PROLENE BLUE C-1 HS 1 X 30 (36EA/BX)"

## (undated) DEVICE — SUTURE 3-0 VICRYL PLUS SH - 8X 18 INCH (12/BX)

## (undated) DEVICE — GUIDEWIRES STARTER (PTFE COATED) J CURVED FIXED CORE 0.035 180CM 10 3 MM J FS

## (undated) DEVICE — TUBE CHEST 32FR. STRAIGHT - (10EA/CA)

## (undated) DEVICE — SHEAR HS FOCUS 9CM CVD - (6/BX)

## (undated) DEVICE — BANDAGE ELASTIC 4 HONEYCOMB - 4"X5YD LF (20/CA)"

## (undated) DEVICE — CLIP SM INTNL HRZN TI ESCP LGT - (24EA/PK 25PK/BX)

## (undated) DEVICE — GLOVE BIOGEL PI ORTHO SZ 6 SURGICAL PF LF (40PR/BX)

## (undated) DEVICE — SYRINGE STERILE 10 ML LL (200/BX)

## (undated) DEVICE — TRAY CATHETER FOLEY URINE METER W/STATLOCK 350ML (10EA/CA)

## (undated) DEVICE — SLEEVE, VASO, THIGH, MED

## (undated) DEVICE — SHEATH RO 4F 10CM (10EA/BX)

## (undated) DEVICE — GLOVE BIOGEL PI INDICATOR SZ 7.5 SURGICAL PF LF -(50/BX 4BX/CA)

## (undated) DEVICE — VESSELOOP SUPER MAXI BLUE - SURG-I-LOOP (2EA/PK 10PK/BX)

## (undated) DEVICE — SOD. CHL. INJ. 0.9% 1000 ML - (14EA/CA 60CA/PF)

## (undated) DEVICE — SPONGE KITTNER DISSECTORS - (5EA/PK 50PK/CA)

## (undated) DEVICE — SODIUM CHL IRRIGATION 0.9% 1000ML (12EA/CA)

## (undated) DEVICE — DRAPE 36X28IN RAD CARM BND BG - (25/CA) O

## (undated) DEVICE — SUTURE 2-0 VICRYL PLUS CT-1 - 8 X 18 INCH(12/BX)

## (undated) DEVICE — VESSELOOP MAXI BLUE STERILE- SURG-I-LOOP (10EA/BX)

## (undated) DEVICE — BALLOON 8.0X40 75CM MUSTANG

## (undated) DEVICE — Device

## (undated) DEVICE — PACK ANGIOGRAPHY DRAPE - (2/CA)

## (undated) DEVICE — SUTURE 3-0 VICRYL PLUS SH - 27 INCH (36/BX)

## (undated) DEVICE — GLOVE BIOGEL SZ 6.5 SURGICAL PF LTX (50PR/BX 4BX/CA)

## (undated) DEVICE — DRAPE IOBAN II INCISE 23X17 - (10EA/BX 4BX/CA)

## (undated) DEVICE — MULTI TORQUE DEVICE DISP (5EA/BX)

## (undated) DEVICE — SHEATH RO 6F 6CM (10EA/BX)

## (undated) DEVICE — SUTURE 3-0 SILK 12 X 18 IN - (36/BX)

## (undated) DEVICE — SUTURE 3-0 ETHILON FS-1 - (36/BX) 30 INCH

## (undated) DEVICE — GLOVE BIOGEL PI INDICATOR SZ 6.5 SURGICAL PF LF - (50/BX 4BX/CA)

## (undated) DEVICE — GLOVE SZ 6 BIOGEL PI MICRO - PF LF (50PR/BX 4BX/CA)

## (undated) DEVICE — SYRINGE 10 ML CONTROL LL (25EA/BX 4BX/CA)

## (undated) DEVICE — KIT RADIAL ARTERY 20GA W/MAX BARRIER AND BIOPATCH (5EA/CA) #10740 IS FOR THE SET RADIAL ARTERIAL

## (undated) DEVICE — SYRINGE 30 ML LL (56/BX)

## (undated) DEVICE — TUBING CLEARLINK DUO-VENT - C-FLO (48EA/CA)

## (undated) DEVICE — CLOSURE SKIN STRIP 1/2 X 4 IN - (STERI STRIP) (50/BX 4BX/CA)

## (undated) DEVICE — CATHETER ANGIO OMNI FLUSH 4FR 65CM - (10/BX)

## (undated) DEVICE — TUBE E-T HI-LO CUFF 8.0MM (10EA/PK)

## (undated) DEVICE — PACK AV FISTULA (2EA/CA)

## (undated) DEVICE — SET FLUID WARMING STANDARD FLOW - (10/CA)

## (undated) DEVICE — SUCTION INSTRUMENT YANKAUER BULBOUS TIP W/O VENT (50EA/CA)

## (undated) DEVICE — TRANSDUCER ADULT DISP. SINGLE BONDED STERILE - (10EA/CA)

## (undated) DEVICE — GLOVE SZ 7 BIOGEL PI MICRO - PF LF (50PR/BX 4BX/CA)

## (undated) DEVICE — LACTATED RINGERS INJ 1000 ML - (14EA/CA 60CA/PF)

## (undated) DEVICE — GLOVE BIOGEL INDICATOR SZ 6.5 SURGICAL PF LTX - (50PR/BX 4BX/CA)

## (undated) DEVICE — SYRINGE SAFETY 3 ML 18 GA X 1 1/2 BLUNT LL (100/BX 8BX/CA)

## (undated) DEVICE — SHEATH RO 5F 10CM (10EA/BX)

## (undated) DEVICE — DRAPE SURGICAL U 77X120 - (10/CA)

## (undated) DEVICE — SUTURE GENERAL

## (undated) DEVICE — VESSELOOP MINI BLUE STERILE - SURG-I-LOOP (10EA/BX)

## (undated) DEVICE — INSTRUMENT JAWCOVER SUTURE - BOOTS (5PK/BX)

## (undated) DEVICE — GOWN WARMING STANDARD FLEX - (30/CA)

## (undated) DEVICE — SUTURE 7-0 PROLENE BV-1 D/A 30 (36PK/BX)"

## (undated) DEVICE — BANDAGE ELASTIC 4 IN X 5 YDS - LATEX FREE(10/BX 5BX/CA)

## (undated) DEVICE — CLIP MED LG INTNL HRZN TI ESCP - (20/BX)

## (undated) DEVICE — CATHETER TROCAR 20FR. (10/CA)

## (undated) DEVICE — SUTURE 5-0 PROLENE C-1 D/A 24 (36PK/BX)"

## (undated) DEVICE — PADDING CAST 4 IN STERILE - 4 X 4 YDS (24/CA)

## (undated) DEVICE — STAPLER 35MM SKIN WIDE ROTATING HEAD (6EA/BX)

## (undated) DEVICE — CLIP MED INTNL HRZN TI ESCP - (25/BX) DISCONTINUED ORDER 21732

## (undated) DEVICE — SENSOR OXIMETER ADULT SPO2 RD SET (20EA/BX)

## (undated) DEVICE — SYRINGE 20 ML LL (50EA/BX 4BX/CA)

## (undated) DEVICE — PAD PREP 24 X 48 CUFFED - (100/CA)

## (undated) DEVICE — SYSTEM PEEL & PLACE 13CM INCISIONS

## (undated) DEVICE — SET BIFURCATED BLOOD - (48EA/CS)

## (undated) DEVICE — SUTURE 4-0 MONOCRYL PLUS PS-1 - 27 INCH (36/BX)

## (undated) DEVICE — WRAP CO-FLEX 4IN X 5YD STERIL - SELF-ADHERENT (18/CA)

## (undated) DEVICE — PAD LAP STERILE 18 X 18 - (5/PK 40PK/CA)

## (undated) DEVICE — DECANTER FLD BLS - (50/CA)

## (undated) DEVICE — CONTAINER SPECIMEN BAG OR - STERILE 4 OZ W/LID (100EA/CA)

## (undated) DEVICE — GLOVE SZ 7.5 BIOGEL PI MICRO - PF LF (50PR/BX)

## (undated) DEVICE — ELECTRODE DUAL RETURN W/ CORD - (50/PK)

## (undated) DEVICE — BAG ISOLATION 20X20 INVISI - SHEILD (10EA/BX)

## (undated) DEVICE — TOWELS CLOTH SURGICAL - (4/PK 20PK/CA)

## (undated) DEVICE — SET LEADWIRE 5 LEAD BEDSIDE DISPOSABLE ECG (1SET OF 5/EA)

## (undated) DEVICE — SODIUM CHL. INJ. 0.9% 500ML (24EA/CA 50CA/PF)

## (undated) DEVICE — MARKER SIZING OMNIFLUSH 5F 70 - 5/BX .035 20CM SEGMENT

## (undated) DEVICE — STAPLER SKIN DISP - (6/BX 10BX/CA) VISISTAT

## (undated) DEVICE — PACK LOWER EXTREMITY - (2/CA)

## (undated) DEVICE — GOWN SURGEONS X-LARGE - DISP. (30/CA)

## (undated) DEVICE — SYRINGE NON SAFETY 10 CC 21 GA X 1-1/2 IN (100/BX 4BX/CA)

## (undated) DEVICE — PEN SKIN MARKER W/RULER - (50EA/BX)

## (undated) DEVICE — CHLORAPREP 26 ML APPLICATOR - ORANGE TINT(25/CA)

## (undated) DEVICE — SUTURE 4-0 SILK 12 X 18 INCH - (36/BX)

## (undated) DEVICE — WIPE SURGICAL INSTRUMENT VISIWIPE 2-7/8IN X 2-7/8IN (20EA/PK)

## (undated) DEVICE — SUTURE 7-0 PROLENE BV-1 D/A 24 (36PK/BX)"

## (undated) DEVICE — SUTURE 6-0 PROLENE BV-1 D/A 30 (36PK/BX)"

## (undated) DEVICE — SYRINGE NON SAFETY TB 1 CC 27 GA X 1/2 IN (100/BX 8BX/CA)